# Patient Record
Sex: MALE | Race: WHITE | NOT HISPANIC OR LATINO | Employment: FULL TIME | URBAN - METROPOLITAN AREA
[De-identification: names, ages, dates, MRNs, and addresses within clinical notes are randomized per-mention and may not be internally consistent; named-entity substitution may affect disease eponyms.]

---

## 2017-01-07 ENCOUNTER — TRANSCRIBE ORDERS (OUTPATIENT)
Dept: ADMINISTRATIVE | Facility: HOSPITAL | Age: 57
End: 2017-01-07

## 2017-01-07 ENCOUNTER — APPOINTMENT (OUTPATIENT)
Dept: LAB | Facility: MEDICAL CENTER | Age: 57
End: 2017-01-07
Payer: COMMERCIAL

## 2017-01-07 DIAGNOSIS — Z00.00 ENCOUNTER FOR GENERAL ADULT MEDICAL EXAMINATION WITHOUT ABNORMAL FINDINGS: ICD-10-CM

## 2017-01-07 DIAGNOSIS — I10 ESSENTIAL (PRIMARY) HYPERTENSION: ICD-10-CM

## 2017-01-07 DIAGNOSIS — Z12.5 ENCOUNTER FOR SCREENING FOR MALIGNANT NEOPLASM OF PROSTATE: ICD-10-CM

## 2017-01-07 LAB
ALBUMIN SERPL BCP-MCNC: 3.7 G/DL (ref 3.5–5)
ALP SERPL-CCNC: 62 U/L (ref 46–116)
ALT SERPL W P-5'-P-CCNC: 37 U/L (ref 12–78)
ANION GAP SERPL CALCULATED.3IONS-SCNC: 7 MMOL/L (ref 4–13)
AST SERPL W P-5'-P-CCNC: 21 U/L (ref 5–45)
BASOPHILS # BLD AUTO: 0.06 THOUSANDS/ΜL (ref 0–0.1)
BASOPHILS NFR BLD AUTO: 1 % (ref 0–1)
BILIRUB SERPL-MCNC: 0.58 MG/DL (ref 0.2–1)
BILIRUB UR QL STRIP: ABNORMAL
BUN SERPL-MCNC: 20 MG/DL (ref 5–25)
CALCIUM SERPL-MCNC: 8.8 MG/DL (ref 8.3–10.1)
CHLORIDE SERPL-SCNC: 105 MMOL/L (ref 100–108)
CHOLEST SERPL-MCNC: 197 MG/DL (ref 50–200)
CLARITY UR: ABNORMAL
CO2 SERPL-SCNC: 30 MMOL/L (ref 21–32)
COLOR UR: YELLOW
CREAT SERPL-MCNC: 1.06 MG/DL (ref 0.6–1.3)
EOSINOPHIL # BLD AUTO: 0.14 THOUSAND/ΜL (ref 0–0.61)
EOSINOPHIL NFR BLD AUTO: 3 % (ref 0–6)
ERYTHROCYTE [DISTWIDTH] IN BLOOD BY AUTOMATED COUNT: 13 % (ref 11.6–15.1)
GFR SERPL CREATININE-BSD FRML MDRD: >60 ML/MIN/1.73SQ M
GLUCOSE SERPL-MCNC: 116 MG/DL (ref 65–140)
GLUCOSE UR STRIP-MCNC: NEGATIVE MG/DL
HCT VFR BLD AUTO: 43.1 % (ref 36.5–49.3)
HDLC SERPL-MCNC: 55 MG/DL (ref 40–60)
HGB BLD-MCNC: 14 G/DL (ref 12–17)
HGB UR QL STRIP.AUTO: NEGATIVE
KETONES UR STRIP-MCNC: ABNORMAL MG/DL
LDLC SERPL CALC-MCNC: 129 MG/DL (ref 0–100)
LEUKOCYTE ESTERASE UR QL STRIP: NEGATIVE
LYMPHOCYTES # BLD AUTO: 2.1 THOUSANDS/ΜL (ref 0.6–4.47)
LYMPHOCYTES NFR BLD AUTO: 44 % (ref 14–44)
MCH RBC QN AUTO: 30.3 PG (ref 26.8–34.3)
MCHC RBC AUTO-ENTMCNC: 32.5 G/DL (ref 31.4–37.4)
MCV RBC AUTO: 93 FL (ref 82–98)
MONOCYTES # BLD AUTO: 0.5 THOUSAND/ΜL (ref 0.17–1.22)
MONOCYTES NFR BLD AUTO: 10 % (ref 4–12)
NEUTROPHILS # BLD AUTO: 1.99 THOUSANDS/ΜL (ref 1.85–7.62)
NEUTS SEG NFR BLD AUTO: 42 % (ref 43–75)
NITRITE UR QL STRIP: NEGATIVE
NRBC BLD AUTO-RTO: 0 /100 WBCS
PH UR STRIP.AUTO: 6 [PH] (ref 4.5–8)
PLATELET # BLD AUTO: 197 THOUSANDS/UL (ref 149–390)
PMV BLD AUTO: 11.3 FL (ref 8.9–12.7)
POTASSIUM SERPL-SCNC: 4 MMOL/L (ref 3.5–5.3)
PROT SERPL-MCNC: 7 G/DL (ref 6.4–8.2)
PROT UR STRIP-MCNC: NEGATIVE MG/DL
PSA SERPL-MCNC: 0.8 NG/ML (ref 0–4)
RBC # BLD AUTO: 4.62 MILLION/UL (ref 3.88–5.62)
SODIUM SERPL-SCNC: 142 MMOL/L (ref 136–145)
SP GR UR STRIP.AUTO: 1.03 (ref 1–1.03)
TRIGL SERPL-MCNC: 67 MG/DL
UROBILINOGEN UR QL STRIP.AUTO: 0.2 E.U./DL
WBC # BLD AUTO: 4.79 THOUSAND/UL (ref 4.31–10.16)

## 2017-01-07 PROCEDURE — 80061 LIPID PANEL: CPT

## 2017-01-07 PROCEDURE — 85025 COMPLETE CBC W/AUTO DIFF WBC: CPT

## 2017-01-07 PROCEDURE — 36415 COLL VENOUS BLD VENIPUNCTURE: CPT

## 2017-01-07 PROCEDURE — G0103 PSA SCREENING: HCPCS

## 2017-01-07 PROCEDURE — 81003 URINALYSIS AUTO W/O SCOPE: CPT

## 2017-01-07 PROCEDURE — 80053 COMPREHEN METABOLIC PANEL: CPT

## 2017-01-10 ENCOUNTER — ALLSCRIPTS OFFICE VISIT (OUTPATIENT)
Dept: OTHER | Facility: OTHER | Age: 57
End: 2017-01-10

## 2017-07-01 DIAGNOSIS — Z12.11 ENCOUNTER FOR SCREENING FOR MALIGNANT NEOPLASM OF COLON: ICD-10-CM

## 2017-07-01 DIAGNOSIS — I10 ESSENTIAL (PRIMARY) HYPERTENSION: ICD-10-CM

## 2017-07-01 DIAGNOSIS — E78.5 HYPERLIPIDEMIA: ICD-10-CM

## 2017-07-01 DIAGNOSIS — R73.02 IMPAIRED GLUCOSE TOLERANCE: ICD-10-CM

## 2017-07-01 DIAGNOSIS — E78.1 PURE HYPERGLYCERIDEMIA: ICD-10-CM

## 2017-07-13 ENCOUNTER — APPOINTMENT (OUTPATIENT)
Dept: LAB | Facility: MEDICAL CENTER | Age: 57
End: 2017-07-13
Payer: COMMERCIAL

## 2017-07-13 DIAGNOSIS — I10 ESSENTIAL (PRIMARY) HYPERTENSION: ICD-10-CM

## 2017-07-13 DIAGNOSIS — E78.1 PURE HYPERGLYCERIDEMIA: ICD-10-CM

## 2017-07-13 DIAGNOSIS — E78.5 HYPERLIPIDEMIA: ICD-10-CM

## 2017-07-13 DIAGNOSIS — R73.02 IMPAIRED GLUCOSE TOLERANCE: ICD-10-CM

## 2017-07-13 LAB
ALBUMIN SERPL BCP-MCNC: 3.8 G/DL (ref 3.5–5)
ALP SERPL-CCNC: 61 U/L (ref 46–116)
ALT SERPL W P-5'-P-CCNC: 45 U/L (ref 12–78)
ANION GAP SERPL CALCULATED.3IONS-SCNC: 8 MMOL/L (ref 4–13)
AST SERPL W P-5'-P-CCNC: 34 U/L (ref 5–45)
BASOPHILS # BLD AUTO: 0.07 THOUSANDS/ΜL (ref 0–0.1)
BASOPHILS NFR BLD AUTO: 1 % (ref 0–1)
BILIRUB SERPL-MCNC: 0.69 MG/DL (ref 0.2–1)
BILIRUB UR QL STRIP: NEGATIVE
BUN SERPL-MCNC: 13 MG/DL (ref 5–25)
CALCIUM SERPL-MCNC: 9.1 MG/DL (ref 8.3–10.1)
CHLORIDE SERPL-SCNC: 102 MMOL/L (ref 100–108)
CHOLEST SERPL-MCNC: 170 MG/DL (ref 50–200)
CLARITY UR: CLEAR
CO2 SERPL-SCNC: 29 MMOL/L (ref 21–32)
COLOR UR: YELLOW
CREAT SERPL-MCNC: 1.03 MG/DL (ref 0.6–1.3)
EOSINOPHIL # BLD AUTO: 0.25 THOUSAND/ΜL (ref 0–0.61)
EOSINOPHIL NFR BLD AUTO: 5 % (ref 0–6)
ERYTHROCYTE [DISTWIDTH] IN BLOOD BY AUTOMATED COUNT: 13.1 % (ref 11.6–15.1)
EST. AVERAGE GLUCOSE BLD GHB EST-MCNC: 123 MG/DL
GFR SERPL CREATININE-BSD FRML MDRD: >60 ML/MIN/1.73SQ M
GLUCOSE P FAST SERPL-MCNC: 101 MG/DL (ref 65–99)
GLUCOSE UR STRIP-MCNC: NEGATIVE MG/DL
HBA1C MFR BLD: 5.9 % (ref 4.2–6.3)
HCT VFR BLD AUTO: 41.1 % (ref 36.5–49.3)
HDLC SERPL-MCNC: 53 MG/DL (ref 40–60)
HGB BLD-MCNC: 13.9 G/DL (ref 12–17)
HGB UR QL STRIP.AUTO: NEGATIVE
KETONES UR STRIP-MCNC: NEGATIVE MG/DL
LDLC SERPL CALC-MCNC: 104 MG/DL (ref 0–100)
LEUKOCYTE ESTERASE UR QL STRIP: NEGATIVE
LYMPHOCYTES # BLD AUTO: 2.77 THOUSANDS/ΜL (ref 0.6–4.47)
LYMPHOCYTES NFR BLD AUTO: 56 % (ref 14–44)
MCH RBC QN AUTO: 31.1 PG (ref 26.8–34.3)
MCHC RBC AUTO-ENTMCNC: 33.8 G/DL (ref 31.4–37.4)
MCV RBC AUTO: 92 FL (ref 82–98)
MONOCYTES # BLD AUTO: 0.41 THOUSAND/ΜL (ref 0.17–1.22)
MONOCYTES NFR BLD AUTO: 8 % (ref 4–12)
NEUTROPHILS # BLD AUTO: 1.46 THOUSANDS/ΜL (ref 1.85–7.62)
NEUTS SEG NFR BLD AUTO: 30 % (ref 43–75)
NITRITE UR QL STRIP: NEGATIVE
NRBC BLD AUTO-RTO: 0 /100 WBCS
PH UR STRIP.AUTO: 7 [PH] (ref 4.5–8)
PLATELET # BLD AUTO: 222 THOUSANDS/UL (ref 149–390)
PMV BLD AUTO: 11.5 FL (ref 8.9–12.7)
POTASSIUM SERPL-SCNC: 4 MMOL/L (ref 3.5–5.3)
PROT SERPL-MCNC: 7 G/DL (ref 6.4–8.2)
PROT UR STRIP-MCNC: NEGATIVE MG/DL
RBC # BLD AUTO: 4.47 MILLION/UL (ref 3.88–5.62)
SODIUM SERPL-SCNC: 139 MMOL/L (ref 136–145)
SP GR UR STRIP.AUTO: 1.02 (ref 1–1.03)
TRIGL SERPL-MCNC: 65 MG/DL
UROBILINOGEN UR QL STRIP.AUTO: 0.2 E.U./DL
WBC # BLD AUTO: 4.96 THOUSAND/UL (ref 4.31–10.16)

## 2017-07-13 PROCEDURE — 83036 HEMOGLOBIN GLYCOSYLATED A1C: CPT

## 2017-07-13 PROCEDURE — 81003 URINALYSIS AUTO W/O SCOPE: CPT

## 2017-07-13 PROCEDURE — 36415 COLL VENOUS BLD VENIPUNCTURE: CPT

## 2017-07-13 PROCEDURE — 85025 COMPLETE CBC W/AUTO DIFF WBC: CPT

## 2017-07-13 PROCEDURE — 80061 LIPID PANEL: CPT

## 2017-07-13 PROCEDURE — 80053 COMPREHEN METABOLIC PANEL: CPT

## 2017-07-18 ENCOUNTER — ALLSCRIPTS OFFICE VISIT (OUTPATIENT)
Dept: OTHER | Facility: OTHER | Age: 57
End: 2017-07-18

## 2017-07-19 ENCOUNTER — GENERIC CONVERSION - ENCOUNTER (OUTPATIENT)
Dept: OTHER | Facility: OTHER | Age: 57
End: 2017-07-19

## 2018-01-01 DIAGNOSIS — I10 ESSENTIAL (PRIMARY) HYPERTENSION: ICD-10-CM

## 2018-01-01 DIAGNOSIS — Z12.5 ENCOUNTER FOR SCREENING FOR MALIGNANT NEOPLASM OF PROSTATE: ICD-10-CM

## 2018-01-01 DIAGNOSIS — R73.02 IMPAIRED GLUCOSE TOLERANCE: ICD-10-CM

## 2018-01-01 DIAGNOSIS — E78.1 PURE HYPERGLYCERIDEMIA: ICD-10-CM

## 2018-01-11 NOTE — PROGRESS NOTES
Assessment    1  Benign essential hypertension (401 1) (I10)   2  Erectile dysfunction (607 84) (N52 9)   3  Hypertriglyceridemia (272 1) (E78 1)   4  History of hyperlipidemia (V12 29) (Z86 39)    Plan  Benign essential hypertension, Encounter for prostate cancer screening,  Hypertriglyceridemia, Impaired glucose tolerance    · (1) COMPREHENSIVE METABOLIC PANEL; Status:Active; Requested AEE:56PLQ4720;    · (1) HEMOGLOBIN A1C; Status:Active; Requested UEQ:84IXK8912;    · (1) LIPID PANEL FASTING W DIRECT LDL REFLEX; Status:Active; Requested  XUY:90DIC7794;   Erectile dysfunction    · Levitra 20 MG Oral Tablet; Take as directed  Health Maintenance    · EKG/ECG- POC; Status:Complete;   Done: 34BOC5912 10:13AM      #1 hypertension continue present medications patient's home diary of blood pressures indicates good control  He has no apparent side effects of his present blood pressure medication  #2 patient has a history of erectile dysfunction and presently is utilizing Levitra 20 mg on an as-needed basis for treatment of his erectile dysfunction he finds this medication to be effective at controlling his condition  #3 hypertriglyceridemia and hyperlipidemia patient continues on a careful diet along with fish oil and garlic supplementation as well as turmeric to control his lipid profile  Review of his blood work from this visit indicates total cholesterol 197 with an LDL of 129 and a triglyceride of 67 and HDL 55  He did review with the patient the dietary changes that would help address his mild elevation in LDL  At this time he is not interested in any prescription medications for control of his lipids  Discussion/Summary    In summary this gentleman presents today for a health maintenance examination  He appears to be stable and healthy  We encouraged him to continue with regular exercise and maintain a healthy weight   Healthy diet was reviewed especially with an emphasis on his mild elevation in LDL and borderline glucose intolerance  His electrocardio gram indicates no acute changes and remains a normal tracing  Would like to see him in 6 months for his next blood pressure checkup  Chief Complaint  Pt here for annual physical       History of Present Illness  HM, Adult Male: The patient is being seen for a health maintenance evaluation  The last health maintenance visit was 1 year(s) ago  Social History: Household members include spouse  He is   Work status: working full time  The patient has never smoked cigarettes  He reports occasional alcohol use  He has never used illicit drugs  General Health: The patient's health since the last visit is described as good  He has regular dental visits  He denies vision problems  He denies hearing loss  Immunizations status: up to date  Lifestyle:  He consumes a diverse and healthy diet  He does not have any weight concerns  He exercises regularly  He does not use tobacco  He denies alcohol use  He denies drug use  Screening: cancer screening reviewed and current  metabolic screening reviewed and current  risk screening reviewed and current  HPI: This pleasant 53-year-old gentle woman presents today for routine annual health maintenance examination  He has no active complaints at this time  A review of his medical record indicates a history of hypertension erectile dysfunction hyperlipidemia and hypertriglyceridemia  Review of Systems    Constitutional: No fever or chills, feels well, no tiredness, no recent weight gain or weight loss  Eyes: No complaints of eye pain, no red eyes, no discharge from eyes, no itchy eyes  ENT: no complaints of earache, no hearing loss, no nosebleeds, no nasal discharge, no sore throat, no hoarseness  Cardiovascular: No complaints of slow heart rate, no fast heart rate, no chest pain, no palpitations, no leg claudication, no lower extremity     Respiratory: No complaints of shortness of breath, no wheezing, no cough, no SOB on exertion, no orthopnea or PND  Gastrointestinal: No complaints of abdominal pain, no constipation, no nausea or vomiting, no diarrhea or bloody stools  Genitourinary: No complaints of dysuria, no incontinence, no hesitancy, no nocturia, no genital lesion, no testicular pain  Musculoskeletal: No complaints of arthralgia, no myalgias, no joint swelling or stiffness, no limb pain or swelling  Integumentary: No complaints of skin rash or skin lesions, no itching, no skin wound, no dry skin  Neurological: No compliants of headache, no confusion, no convulsions, no numbness or tingling, no dizziness or fainting, no limb weakness, no difficulty walking  Psychiatric: Is not suicidal, no sleep disturbances, no anxiety or depression, no change in personality, no emotional problems  Endocrine: No complaints of proptosis, no hot flashes, no muscle weakness, no erectile dysfunction, no deepening of the voice, no feelings of weakness  Hematologic/Lymphatic: No complaints of swollen glands, no swollen glands in the neck, does not bleed easily, no easy bruising  Active Problems    1  Benign essential hypertension (401 1) (I10)   2  Colon cancer screening (V76 51) (Z12 11)   3  Encounter for prostate cancer screening (V76 44) (Z12 5)   4  Erectile dysfunction (607 84) (N52 9)   5  History of hyperlipidemia (V12 29) (Z86 39)   6  Hypertriglyceridemia (272 1) (E78 1)   7  Impaired glucose tolerance (790 22) (R73 02)   8  Need for immunization against influenza (V04 81) (Z23)   9   Overweight (278 02) (E66 3)    Family History  Mother    · Family history of HTN (hypertension) (401 9) (I10)   · Family history of Osteoporosis (733 00) (M81 0)  Father    · Family history of Diabetes (250 00) (E11 9)   · Family history of Heart disease (429 9) (I51 9)   · Family history of HTN (hypertension) (401 9) (I10)  Sister    · Family history of HTN (hypertension) (401 9) (I10)   · Family history of Osteoporosis (733 00) (M81 0)  Brother    · Family history of Cancer of kidney (189 0) (C64 9)   · Family history of HTN (hypertension) (401 9) (I10)   · Family history of Kidney stones, calcium oxalate (592 0) (N20 0)    Social History    · Never a smoker    Current Meds   1  Adult Low Dose Aspirin 81 MG TABS; Therapy: (Recorded:30Fyf0299) to Recorded   2  Fish Oil CAPS; Therapy: (Recorded:00Tlq8311) to Recorded   3  Garlic TABS; Therapy: (Recorded:02Sbd6557) to Recorded   4  Glucosamine Chondroitin Joint Oral Tablet; Therapy: (Recorded:32Ssf8429) to Recorded   5  [address]; Therapy: (Recorded:90Hag0423) to Recorded   6  Levitra 20 MG Oral Tablet; Take as directed; Therapy: 03PHX5100 to (Last Rx:80Wel3063)  Requested for: 01Yyn3359 Ordered   7  Lisinopril 10 MG Oral Tablet; Take 1 tablet daily; Therapy: 56DDI6930 to )  Requested for: 58PDK0857; Last   Rx:35Fdg8872 Ordered   8  Mens MultiPlus TABS; Therapy: (Recorded:47Zze6425) to Recorded   9  Niacin 400 MG CPCR; Therapy: (Recorded:19Giu8942) to Recorded   10  Turmeric Curcumin Oral Capsule; Therapy: (Recorded:66Wmw6914) to Recorded    Allergies    1  Penicillins    Vitals   Recorded: 86UFZ7833 09:51AM   Temperature 97 9 F   Heart Rate 82   Respiration 12   Systolic 768   Diastolic 82   Height 6 ft 2 5 in   Weight 196 lb    BMI Calculated 24 83   BSA Calculated 2 17   O2 Saturation 99     Physical Exam    Constitutional   General appearance: No acute distress, well appearing and well nourished  Head and Face   Head and face: Normal     Eyes   Conjunctiva and lids: No erythema, swelling or discharge  Pupils and irises: Equal, round, reactive to light  Ophthalmoscopic examination: Normal fundi and optic discs  Ears, Nose, Mouth, and Throat   External inspection of ears and nose: Normal     Otoscopic examination: Tympanic membranes translucent with normal light reflex  Canals patent without erythema      Nasal mucosa, septum, and turbinates: Normal without edema or erythema  Lips, teeth, and gums: Normal, good dentition  Oropharynx: Normal with no erythema, edema, exudate or lesions  Neck   Neck: Supple, symmetric, trachea midline, no masses  Thyroid: Normal, no thyromegaly  Pulmonary   Respiratory effort: No increased work of breathing or signs of respiratory distress  Percussion of chest: Normal     Auscultation of lungs: Clear to auscultation  Cardiovascular   Auscultation of heart: Normal rate and rhythm, normal S1 and S2, no murmurs  Carotid pulses: 2+ bilaterally  Abdominal aorta: Normal     Pedal pulses: 2+ bilaterally  Examination of extremities for edema and/or varicosities: Normal     Abdomen   Abdomen: Non-tender, no masses  Liver and spleen: No hepatomegaly or splenomegaly  Examination for hernias: No hernias appreciated  Anus, perineum, and rectum: Normal sphincter tone, no masses, no prolapse  Genitourinary   Scrotal contents: Normal testes, no masses  Penis: Normal, no lesions  Digital rectal exam of prostate: Normal size, no masses  Lymphatic   Palpation of lymph nodes in neck: No lymphadenopathy  Palpation of lymph nodes in groin: No lymphadenopathy  Musculoskeletal   Gait and station: Normal     Inspection/palpation of digits and nails: Normal without clubbing or cyanosis  Inspection/palpation of joints, bones, and muscles: Normal     Range of motion: Normal     Stability: Normal     Muscle strength/tone: Normal     Skin   Skin and subcutaneous tissue: Normal without rashes or lesions  Neurologic   Cortical function: Normal mental status  Reflexes: 2+ and symmetric  Coordination: Normal finger to nose and heel to shin  Psychiatric   Judgment and insight: Normal     Orientation to person, place and time: Normal     Recent and remote memory: Intact      Mood and affect: Normal        Results/Data  PHQ-2 Adult Depression Screening 51Voc6518 10:54AM User, [facility]     Test Name Result Flag Reference   PHQ-2 Adult Depression Score 0     Over the last two weeks, how often have you been bothered by any of the following problems? Little interest or pleasure in doing things: Not at all - 0  Feeling down, depressed, or hopeless: Not at all - 0   PHQ-2 Adult Depression Screening Negative       EKG/ECG- POC 07GSW1417 10:13AM Simran Friday     Test Name Result Flag Reference   EKG/ECG See scanned report         Future Appointments    Date/Time Provider Specialty Site   01/23/2018 09:30 AM LEANNA Bosch  Internal Medicine [facility] INTERNAL [facility]   07/20/2018 10:45 AM LEANNA Bosch   Internal Medicine [facility] INTERNAL MED     Signatures   Electronically signed by : LEANNA Salgado ; Jul 19 2017  8:54PM EST                       (Author)

## 2018-01-12 VITALS
BODY MASS INDEX: 24.37 KG/M2 | SYSTOLIC BLOOD PRESSURE: 138 MMHG | DIASTOLIC BLOOD PRESSURE: 82 MMHG | WEIGHT: 196 LBS | TEMPERATURE: 97.9 F | HEART RATE: 82 BPM | RESPIRATION RATE: 12 BRPM | OXYGEN SATURATION: 99 % | HEIGHT: 75 IN

## 2018-01-13 NOTE — PROGRESS NOTES
Assessment    1  Colon cancer screening (V76 51) (Z12 11)   2  Erectile dysfunction (607 84) (N52 9)   3  Hyperlipemia (272 4) (E78 5)   4  Hypertriglyceridemia (272 1) (E78 1)   5  Overweight (278 02) (E66 3)   6  Impaired glucose tolerance (790 22) (R73 02)   7  Benign essential hypertension (401 1) (I10)    Plan  Benign essential hypertension    · EKG/ECG- POC; Status:Complete;   Done: 08YBX9968 02:17PM  Benign essential hypertension, Health Maintenance    · (1) CBC/PLT/DIFF; Status:Active; Requested for:63Ghb9772;    · (1) COMPREHENSIVE METABOLIC PANEL; Status:Active; Requested for:78Aox9071;    · (1) LIPID PANEL FASTING W DIRECT LDL REFLEX; Status:Active; Requested  for:82Fvo6908;   Benign essential hypertension, Health Maintenance, Encounter for prostate cancer  screening    · (1) PSA (SCREEN) (Dx V76 44 Screen for Prostate Cancer); Status:Active; Requested  for:98Hgu6025;   Colon cancer screening, Encounter for prostate cancer screening    · (1) OCCULT BLOOD, FECAL IMMUNOCHEMICAL TEST; Status:Active; Requested  for:42Pge3313;   Encounter for prostate cancer screening    · (1) URINALYSIS (will reflex a microscopy if leukocytes, occult blood, protein or nitrites  are not within normal limits); Status:Active; Requested for:64Cvz7370;      Continue the lisinopril medication for hypertension and Levitra for erectile dysfunction     Discussion/Summary   In summary the patient's physical examination appears to be stable with the exception of gaining 9 pounds over the past year  We discussed decreasing calorie intake and increasing his exercise in an effort to decrease this weight gain  His blood pressure control is very good on his present dose of lisinopril  He does have a history of erectile dysfunction and its good results with Levitra medication  His blood testing is pending at the time of this dictation  His electrocardiogram seems to be stable without any significant abnormalities   I will see him in 6 months for his next blood pressure checkup and a one-year annual physical examination  Chief Complaint  patient is here for a physical       History of Present Illness  HPI: This is an annual complete wellness visit for this 69-year-old gentleman  He presents today with a history of hypertension erectile disorder hyperlipidemia hypertriglyceridemia and glucose intolerance  He is gained 9 pounds over the past year  He has some early arthritic changes in his thumbs bilaterally the changes are consistent with osteo-arthritis  Review of Systems    Constitutional: No fever or chills, feels well, no tiredness, no recent weight gain or weight loss  Eyes: No complaints of eye pain, no red eyes, no discharge from eyes, no itchy eyes  ENT: no complaints of earache, no hearing loss, no nosebleeds, no nasal discharge, no sore throat, no hoarseness  Cardiovascular: No complaints of slow heart rate, no fast heart rate, no chest pain, no palpitations, no leg claudication, no lower extremity  Respiratory: No complaints of shortness of breath, no wheezing, no cough, no SOB on exertion, no orthopnea or PND  Gastrointestinal: Occasional gastritis symptoms relieved with either H2 agent or antiacid  Genitourinary: No complaints of dysuria, no incontinence, no hesitancy, no nocturia, no genital lesion, no testicular pain  Musculoskeletal: Early osteoarthritis changes in both thumbs  Integumentary: No complaints of skin rash or skin lesions, no itching, no skin wound, no dry skin  Neurological: No compliants of headache, no confusion, no convulsions, no numbness or tingling, no dizziness or fainting, no limb weakness, no difficulty walking  Psychiatric: Is not suicidal, no sleep disturbances, no anxiety or depression, no change in personality, no emotional problems  Endocrine: No complaints of proptosis, no hot flashes, no muscle weakness, no erectile dysfunction, no deepening of the voice, no feelings of weakness  Hematologic/Lymphatic: No complaints of swollen glands, no swollen glands in the neck, does not bleed easily, no easy bruising  Active Problems    1  Benign essential hypertension (401 1) (I10)    Family History  Mother    · Family history of HTN (hypertension) (401 9) (I10)   · Family history of Osteoporosis (733 00) (M81 0)  Father    · Family history of Diabetes (250 00) (E11 9)   · Family history of Heart disease (429 9) (I51 9)   · Family history of HTN (hypertension) (401 9) (I10)  Sister    · Family history of HTN (hypertension) (401 9) (I10)   · Family history of Osteoporosis (733 00) (M81 0)  Brother    · Family history of Cancer of kidney (189 0) (C64 9)   · Family history of HTN (hypertension) (401 9) (I10)   · Family history of Kidney stones, calcium oxalate (592 0) (N20 0)    Social History    · Never a smoker    Current Meds   1  Levitra 20 MG Oral Tablet; Take as directed Recorded   2  Lisinopril 20 MG Oral Tablet; take 1 tablet by mouth every day; Therapy: 92HLE7370 to (Evaluate:18Jun2017)  Requested for: 68JJQ0260; Last   RF:38LWX6688 Ordered    Allergies    1  Penicillins    Vitals   ** Printed in Appendix #1 below  Physical Exam    Constitutional   General appearance: No acute distress, well appearing and well nourished  Head and Face   Head and face: Normal     Eyes   Conjunctiva and lids: No erythema, swelling or discharge  Pupils and irises: Equal, round, reactive to light  Ophthalmoscopic examination: Normal fundi and optic discs  Ears, Nose, Mouth, and Throat   External inspection of ears and nose: Normal     Otoscopic examination: Tympanic membranes translucent with normal light reflex  Canals patent without erythema  Nasal mucosa, septum, and turbinates: Normal without edema or erythema  Lips, teeth, and gums: Normal, good dentition  Oropharynx: Normal with no erythema, edema, exudate or lesions      Neck   Neck: Supple, symmetric, trachea midline, no masses  Thyroid: Normal, no thyromegaly  Pulmonary   Respiratory effort: No increased work of breathing or signs of respiratory distress  Percussion of chest: Normal     Auscultation of lungs: Clear to auscultation  Cardiovascular   Auscultation of heart: Normal rate and rhythm, normal S1 and S2, no murmurs  Carotid pulses: 2+ bilaterally  Abdominal aorta: Normal     Pedal pulses: 2+ bilaterally  Examination of extremities for edema and/or varicosities: Normal     Abdomen   Abdomen: Non-tender, no masses  Liver and spleen: No hepatomegaly or splenomegaly  Examination for hernias: No hernias appreciated  Anus, perineum, and rectum: Normal sphincter tone, no masses, no prolapse  Genitourinary   Scrotal contents: Normal testes, no masses  Penis: Normal, no lesions  Digital rectal exam of prostate: Normal size, no masses  Lymphatic   Palpation of lymph nodes in neck: No lymphadenopathy  Palpation of lymph nodes in groin: No lymphadenopathy  Musculoskeletal   Gait and station: Normal     Inspection/palpation of digits and nails: Normal without clubbing or cyanosis  Inspection/palpation of joints, bones, and muscles: Normal     Range of motion: Normal     Stability: Normal     Muscle strength/tone: Normal     Skin   Skin and subcutaneous tissue: Normal without rashes or lesions  Neurologic   Cortical function: Normal mental status  Reflexes: 2+ and symmetric  Coordination: Normal finger to nose and heel to shin  Psychiatric   Judgment and insight: Normal     Orientation to person, place and time: Normal     Recent and remote memory: Intact  Mood and affect: Normal        Results/Data  EKG/ECG- POC 65SIC2273 02:17PM Talia Culp     Test Name Result Flag Reference   EKG/ECG 07/12/2016         Future Appointments    Date/Time Provider Specialty Site   01/10/2017 10:00 AM LEANNA Salcedo   Internal Medicine [facility] INTERNAL MED     Signatures Electronically signed by : LEANNA Block ; 2016  3:23PM EST                       (Author)    Appendix #1     Patient: Eder Blount; : 1960; MRN: [id number]      Recorded: 91QJS9231 02:53PM Recorded: 21Msx5084 02:49PM Recorded: 20IQU8919 02:24PM   Temperature   97 6 F   Heart Rate 84  95   Respiration   14   Systolic  293 130   Diastolic  74 76   Height   6 ft 2 5 in   Weight   225 lb 0 64 oz   BMI Calculated   28 51   BSA Calculated   2 3   O2 Saturation   98

## 2018-01-14 VITALS
HEIGHT: 75 IN | RESPIRATION RATE: 14 BRPM | HEART RATE: 90 BPM | OXYGEN SATURATION: 99 % | TEMPERATURE: 97.3 F | SYSTOLIC BLOOD PRESSURE: 128 MMHG | WEIGHT: 197 LBS | BODY MASS INDEX: 24.49 KG/M2 | DIASTOLIC BLOOD PRESSURE: 80 MMHG

## 2018-01-16 NOTE — MISCELLANEOUS
Message  Patient is experiencing low BP  he is exercising daily and has lost 26 lbs since his last visit   We will reduce his Lisinopril from 20mg to 10mg daily and observe closely      Plan  Benign essential hypertension    · From  Lisinopril 20 MG Oral Tablet take 1 tablet by mouth every day To  Lisinopril 10 MG Oral Tablet Take 1 tablet daily    Signatures   Electronically signed by : LEANNA Johnson ; Nov 28 2016  8:26AM EST                       (Author)

## 2018-01-18 ENCOUNTER — APPOINTMENT (OUTPATIENT)
Dept: LAB | Facility: MEDICAL CENTER | Age: 58
End: 2018-01-18
Payer: COMMERCIAL

## 2018-01-18 DIAGNOSIS — Z12.5 ENCOUNTER FOR SCREENING FOR MALIGNANT NEOPLASM OF PROSTATE: ICD-10-CM

## 2018-01-18 DIAGNOSIS — R73.02 IMPAIRED GLUCOSE TOLERANCE: ICD-10-CM

## 2018-01-18 DIAGNOSIS — E78.1 PURE HYPERGLYCERIDEMIA: ICD-10-CM

## 2018-01-18 DIAGNOSIS — I10 ESSENTIAL (PRIMARY) HYPERTENSION: ICD-10-CM

## 2018-01-18 LAB
ALBUMIN SERPL BCP-MCNC: 4.1 G/DL (ref 3.5–5)
ALP SERPL-CCNC: 66 U/L (ref 46–116)
ALT SERPL W P-5'-P-CCNC: 29 U/L (ref 12–78)
ANION GAP SERPL CALCULATED.3IONS-SCNC: 6 MMOL/L (ref 4–13)
AST SERPL W P-5'-P-CCNC: 19 U/L (ref 5–45)
BILIRUB SERPL-MCNC: 0.74 MG/DL (ref 0.2–1)
BUN SERPL-MCNC: 20 MG/DL (ref 5–25)
CALCIUM SERPL-MCNC: 9.1 MG/DL (ref 8.3–10.1)
CHLORIDE SERPL-SCNC: 102 MMOL/L (ref 100–108)
CHOLEST SERPL-MCNC: 168 MG/DL (ref 50–200)
CO2 SERPL-SCNC: 31 MMOL/L (ref 21–32)
CREAT SERPL-MCNC: 1.01 MG/DL (ref 0.6–1.3)
EST. AVERAGE GLUCOSE BLD GHB EST-MCNC: 123 MG/DL
GFR SERPL CREATININE-BSD FRML MDRD: 82 ML/MIN/1.73SQ M
GLUCOSE P FAST SERPL-MCNC: 109 MG/DL (ref 65–99)
HBA1C MFR BLD: 5.9 % (ref 4.2–6.3)
HDLC SERPL-MCNC: 49 MG/DL (ref 40–60)
LDLC SERPL CALC-MCNC: 104 MG/DL (ref 0–100)
POTASSIUM SERPL-SCNC: 3.7 MMOL/L (ref 3.5–5.3)
PROT SERPL-MCNC: 7.3 G/DL (ref 6.4–8.2)
SODIUM SERPL-SCNC: 139 MMOL/L (ref 136–145)
TRIGL SERPL-MCNC: 77 MG/DL

## 2018-01-18 PROCEDURE — 80053 COMPREHEN METABOLIC PANEL: CPT

## 2018-01-18 PROCEDURE — 36415 COLL VENOUS BLD VENIPUNCTURE: CPT

## 2018-01-18 PROCEDURE — 83036 HEMOGLOBIN GLYCOSYLATED A1C: CPT

## 2018-01-18 PROCEDURE — 80061 LIPID PANEL: CPT

## 2018-01-23 ENCOUNTER — ALLSCRIPTS OFFICE VISIT (OUTPATIENT)
Dept: OTHER | Facility: OTHER | Age: 58
End: 2018-01-23

## 2018-01-24 NOTE — PROGRESS NOTES
Assessment   1  Nodule of shaft of penis (607 89) (N48 89)   2  Benign essential hypertension (401 1) (I10)   3  Erectile dysfunction (607 84) (N52 9)   4  Hypertriglyceridemia (272 1) (E78 1)   5  Impaired glucose tolerance (790 22) (R73 02)   6  Overweight (278 02) (E66 3)    Plan   Benign essential hypertension, Colon cancer screening, Encounter for prostate cancer screening,    History of hyperlipidemia, Hypertriglyceridemia, Impaired glucose tolerance, Overweight    · (1) PSA (SCREEN) (Dx V76 44 Screen for Prostate Cancer); Status:Active; Requested    for:01Jul2018; Benign essential hypertension, Colon cancer screening, History of hyperlipidemia,    Hypertriglyceridemia, Impaired glucose tolerance, Overweight    · (1) CBC/PLT/DIFF; Status:Active; Requested for:01Jul2018;    · (1) COMPREHENSIVE METABOLIC PANEL; Status:Active; Requested for:01Jul2018;    · (1) LIPID PANEL FASTING W DIRECT LDL REFLEX; Status:Active; Requested for:01Jul2018;    · (1) OCCULT BLOOD, FECAL IMMUNOCHEMICAL TEST; Status:Active; Requested for:01Jul2018;    · (1) URINALYSIS (will reflex a microscopy if leukocytes, occult blood, protein or nitrites are not within    normal limits); Status:Active; Requested for:01Jul2018;   Need for immunization against influenza    · Fluzone Quadrivalent Intramuscular Suspension      1  Hypertension control at this time appears to be good I recommend that he continue on lisinopril 10 mg daily he has no apparent side effects of the medication  Weight loss would be beneficial for improved control of his hypertension  2   Erectile dysfunction the patient continues to have difficulties maintaining erections  He presently is using Levitra on an occasional basis  He has used a ring type tourniquet device at times which may have caused some damage to the shaft of his penis I have referred him to Urology for further evaluation of this nodularity in the soft tissue of the penis            3  Hypertriglyceridemia control is good patient is encouraged to continue with a healthy diet regular exercise weight reduction and Krill oil as well as fish oil supplementation  4   Impaired glucose tolerance it is mild at this point recommend lifestyle adjustments to address it  Recommend that he maintain a healthy diet no void concentrated sweets in decrease his carbohydrate consumption  He is encouraged to get regular exercise in the form of walking when ever possible and also is total calorie consumption on a daily basis  5   Overweight condition we discussed today the affect of overweight on the patient's hypertension triglyceridemia and glucose intolerance  We reviewed in detail the lifestyle adjustments to address his overweight condition including regular exercise and calorie consumption reduction  Chief Complaint   Chief Complaint Free Text Note Form: patient is here for a 6 month follow up  History of Present Illness   HPI: This is a routine visit for this 53-year-old gentleman  He has a history of mild glucose intolerance as well as hyperlipidemia  He had blood work performed for this visit and we reviewed the results with the patient today  His hemoglobin A1c is 5 9% consistent with his last test in July  His fasting glucose was 109 indicating adequate control of his blood sugars at this time  Reviewed with the patient diet exercise and weight control as the favorable lifestyle treatment for this condition  Patient's lipid profile was also reviewed with the patient today is encouraged to continue with a healthy diet regular exercise and continue on his Krill oil and fish well for control of his cholesterol and triglycerides  He has a history of hypertension and is on lisinopril 10 mg daily which appears to be working well for control of his hypertension     patient also notes that he has detected 2 firm areas in the penis shaft that he believes started after using a ring type device on his penis to maintain erections  These have not increased in size but remained sensitive  Review of Systems   Complete-Male:      Constitutional: No fever or chills, feels well, no tiredness, no recent weight gain or weight loss  Eyes: No complaints of eye pain, no red eyes, no discharge from eyes, no itchy eyes  ENT: no complaints of earache, no hearing loss, no nosebleeds, no nasal discharge, no sore throat, no hoarseness  Cardiovascular: No complaints of slow heart rate, no fast heart rate, no chest pain, no palpitations, no leg claudication, no lower extremity  Respiratory: No complaints of shortness of breath, no wheezing, no cough, no SOB on exertion, no orthopnea or PND  Gastrointestinal: No complaints of abdominal pain, no constipation, no nausea or vomiting, no diarrhea or bloody stools  Genitourinary: No complaints of dysuria, no incontinence, no hesitancy, no nocturia, no genital lesion, no testicular pain  Musculoskeletal: No complaints of arthralgia, no myalgias, no joint swelling or stiffness, no limb pain or swelling  Integumentary: No complaints of skin rash or skin lesions, no itching, no skin wound, no dry skin  Neurological: No compliants of headache, no confusion, no convulsions, no numbness or tingling, no dizziness or fainting, no limb weakness, no difficulty walking  Psychiatric: Is not suicidal, no sleep disturbances, no anxiety or depression, no change in personality, no emotional problems  Endocrine: No complaints of proptosis, no hot flashes, no muscle weakness, no erectile dysfunction, no deepening of the voice, no feelings of weakness  Hematologic/Lymphatic: No complaints of swollen glands, no swollen glands in the neck, does not bleed easily, no easy bruising  Active Problems   1  Benign essential hypertension (401 1) (I10)   2  Colon cancer screening (V76 51) (Z12 11)   3   Encounter for prostate cancer screening (V76 44) (Z12 5)   4  Erectile dysfunction (607 84) (N52 9)   5  History of hyperlipidemia (V12 29) (Z86 39)   6  Hypertriglyceridemia (272 1) (E78 1)   7  Impaired glucose tolerance (790 22) (R73 02)   8  Need for immunization against influenza (V04 81) (Z23)   9  Overweight (278 02) (E66 3)    Past Medical History   Active Problems And Past Medical History Reviewed: The active problems and past medical history were reviewed and updated today  Surgical History   Surgical History Reviewed: The surgical history was reviewed and updated today  Family History   Mother    1  Family history of HTN (hypertension) (401 9) (I10)   2  Family history of Osteoporosis (733 00) (M81 0)  Father    3  Family history of Diabetes (250 00) (E11 9)   4  Family history of Heart disease (429 9) (I51 9)   5  Family history of HTN (hypertension) (401 9) (I10)  Sister    6  Family history of HTN (hypertension) (401 9) (I10)   7  Family history of Osteoporosis (733 00) (M81 0)  Brother    8  Family history of Cancer of kidney (189 0) (C64 9)   9  Family history of HTN (hypertension) (401 9) (I10)   10  Family history of Kidney stones, calcium oxalate (592 0) (N20 0)  Family History Reviewed: The family history was reviewed and updated today  Social History    · Never a smoker  Social History Reviewed: The social history was reviewed and updated today  The social history was reviewed and is unchanged  Current Meds    1  Adult Low Dose Aspirin 81 MG TABS; Therapy: (Recorded:63Dii7420) to Recorded   2  Fish Oil CAPS; Therapy: (Recorded:62Nwu7712) to Recorded   3  Garlic TABS; Therapy: (Recorded:63Nqu1727) to Recorded   4  Glucosamine Chondroitin Joint Oral Tablet; Therapy: (Recorded:64Dkn7406) to Recorded   5  [address]; Therapy: (Recorded:50Ezr2520) to Recorded   6  Levitra 20 MG Oral Tablet; Take as directed;      Therapy: 42UKE2611 to (Last Rx:91Giq7808)  Requested for: 38DIR1296 Ordered   7  Lisinopril 10 MG Oral Tablet; TAKE 1 TABLET DAILY FOR BLOOD PRESSURE; Therapy: 65WKO4256 to (Evaluate:06Jan2019)  Requested for: 32CCL4192; Last Rx:11Jan2018     Ordered   8  Mens MultiPlus TABS; Therapy: (Recorded:34Dwl4801) to Recorded   9  Niacin 400 MG CPCR; Therapy: (Recorded:32Mot2559) to Recorded   10  Turmeric Curcumin Oral Capsule; Therapy: (Recorded:34Xyb4388) to Recorded  Medication List Reviewed: The medication list was reviewed and updated today  Allergies   1  Penicillins    Vitals   Vital Signs    Recorded: 04TMZ8534 09:46AM Recorded: 71WNY0414 09:15AM   Temperature  98 F   Heart Rate  83   Respiration  12   Systolic 785 724   Diastolic 76 80   Height  6 ft 2 5 in   Weight  197 lb 0 6 oz   BMI Calculated  24 96   BSA Calculated  2 17   O2 Saturation  99     Physical Exam        Constitutional      General appearance: No acute distress, well appearing and well nourished  Eyes      Conjunctiva and lids: No swelling, erythema, or discharge  Pulmonary      Respiratory effort: No increased work of breathing or signs of respiratory distress  Auscultation of lungs: Clear to auscultation, equal breath sounds bilaterally, no wheezes, no rales, no rhonci  Cardiovascular      Auscultation of heart: Normal rate and rhythm, normal S1 and S2, without murmurs  Additional Exam:  Patient has 2 areas of of firmness in the shaft of his penis they do not seem to be inflamed or irritated  I suspect they are some type of scar tissue possibly due to the previous use of a ring device to maintain erections  Future Appointments      Date/Time Provider Specialty Site   07/20/2018 10:45 AM LEANNA Bhatt   Internal Medicine [facility] INTERNAL MED     Signatures    Electronically signed by : LEANNA Raymundo ; Jan 23 2018  4:37PM EST                       (Author)

## 2018-01-29 ENCOUNTER — TELEPHONE (OUTPATIENT)
Dept: UROLOGY | Facility: AMBULATORY SURGERY CENTER | Age: 58
End: 2018-01-29

## 2018-02-16 RX ORDER — GLUCOSAM/MSM/CHOND/HYALURON AC 750-60-150
TABLET ORAL
COMMUNITY
End: 2018-07-20

## 2018-02-16 RX ORDER — NIACIN 500 MG
TABLET ORAL
COMMUNITY
End: 2018-07-20

## 2018-02-16 RX ORDER — VARDENAFIL HYDROCHLORIDE 20 MG/1
TABLET ORAL
COMMUNITY
Start: 2016-07-12 | End: 2018-07-20 | Stop reason: ALTCHOICE

## 2018-02-16 RX ORDER — BIOTIN 5 MG
TABLET ORAL
COMMUNITY

## 2018-02-16 RX ORDER — LISINOPRIL 10 MG/1
1 TABLET ORAL DAILY
COMMUNITY
Start: 2016-06-23 | End: 2019-01-02 | Stop reason: SDUPTHER

## 2018-02-19 ENCOUNTER — CONSULT (OUTPATIENT)
Dept: UROLOGY | Facility: AMBULATORY SURGERY CENTER | Age: 58
End: 2018-02-19
Payer: COMMERCIAL

## 2018-02-19 VITALS
HEIGHT: 75 IN | DIASTOLIC BLOOD PRESSURE: 90 MMHG | HEART RATE: 92 BPM | WEIGHT: 198 LBS | BODY MASS INDEX: 24.62 KG/M2 | SYSTOLIC BLOOD PRESSURE: 120 MMHG

## 2018-02-19 DIAGNOSIS — N52.9 ERECTILE DYSFUNCTION, UNSPECIFIED ERECTILE DYSFUNCTION TYPE: ICD-10-CM

## 2018-02-19 DIAGNOSIS — N48.6 PEYRONIE DISEASE: Primary | ICD-10-CM

## 2018-02-19 LAB
SL AMB  POCT GLUCOSE, UA: NORMAL
SL AMB LEUKOCYTE ESTERASE,UA: NORMAL
SL AMB POCT BILIRUBIN,UA: NORMAL
SL AMB POCT BLOOD,UA: NORMAL
SL AMB POCT CLARITY,UA: NORMAL
SL AMB POCT COLOR,UA: YELLOW
SL AMB POCT KETONES,UA: NORMAL
SL AMB POCT NITRITE,UA: NORMAL
SL AMB POCT PH,UA: 5
SL AMB POCT SPECIFIC GRAVITY,UA: 1.01
SL AMB POCT URINE PROTEIN: NORMAL
SL AMB POCT UROBILINOGEN: NORMAL

## 2018-02-19 PROCEDURE — 99244 OFF/OP CNSLTJ NEW/EST MOD 40: CPT | Performed by: UROLOGY

## 2018-02-19 PROCEDURE — 81002 URINALYSIS NONAUTO W/O SCOPE: CPT | Performed by: UROLOGY

## 2018-02-19 RX ORDER — VARDENAFIL HYDROCHLORIDE 10 MG/1
10 TABLET ORAL EVERY 24 HOURS
COMMUNITY
End: 2018-07-20

## 2018-02-19 NOTE — PROGRESS NOTES
2/19/2018    Tessa De La Torre  1960  [id number]        Assessment  Peyronie's plaque    Plan  Had an extended discussion with the patient about historical treatments for Peyronie's as well as newer treatments  Focused on surgical revision and Xiaflex injection  At this time he is not interested in any treatment  We discussed that observation is certainly appropriate for him  He will notify us if he develops any worsening symptoms, but otherwise follow-up only as needed  He will continue prostate cancer screening with his primary care physician  History of Present Illness  Tawanda Cadena is a 61 y o  male complains of nodules within the penis  He reports that over 1 year ago he fell asleep with a constriction band around the base of the penis  Afterwards he noticed a lump towards the base of the penis  Initially was painful  There is no further pain however he thinks there is more than 1 lump at this time  He is also describing an hourglass type deformity with erection  Mild curvature dorsally  No other complaints  No urinary symptoms  No family history of prostate cancer  Review of Systems  Review of Systems   Constitutional: Negative  HENT: Negative  Respiratory: Negative  Cardiovascular: Negative  Gastrointestinal: Negative  Genitourinary:        As per HPI   Musculoskeletal: Negative  Skin: Negative  Neurological: Negative  Hematological: Negative  Past Medical History  History reviewed  No pertinent past medical history      Past Social History  Past Surgical History:   Procedure Laterality Date    GALLBLADDER SURGERY      TONSILLECTOMY  1963       Past Family History  Family History   Problem Relation Age of Onset    Hypertension Mother     Osteoporosis Mother     Diabetes Father     Heart disease Father     Hypertension Father     Hypertension Sister     Osteoporosis Sister     Kidney cancer Brother     Hypertension Brother        Past Social history  Social History     Social History    Marital status: /Civil Union     Spouse name: N/A    Number of children: N/A    Years of education: N/A     Occupational History    Not on file  Social History Main Topics    Smoking status: Never Smoker    Smokeless tobacco: Never Used    Alcohol use 1 2 oz/week     2 Glasses of wine per week      Comment: daily    Drug use: Unknown    Sexual activity: Not on file     Other Topics Concern    Not on file     Social History Narrative    No narrative on file       Current Medications  Current Outpatient Prescriptions   Medication Sig Dispense Refill    aspirin 81 MG tablet Take by mouth      Garlic 731 MG TABS Take by mouth      Glucos-Chondroit-Hyaluron-MSM (GLUCOSAMINE CHONDROITIN JOINT) TABS Take by mouth      Krill Oil 1000 MG CAPS Take by mouth      lisinopril (ZESTRIL) 10 mg tablet Take 1 tablet by mouth daily      Multiple Vitamins-Minerals (MENS MULTIPLUS PO) Take by mouth      niacin 500 mg tablet Take by mouth      Omega-3 Fatty Acids (FISH OIL) 645 MG CAPS Take by mouth      TURMERIC CURCUMIN PO Take by mouth      vardenafil (LEVITRA) 10 MG tablet Take 10 mg by mouth every 24 hours      vardenafil (LEVITRA) 20 MG tablet Take by mouth       No current facility-administered medications for this visit  Allergies  Allergies   Allergen Reactions    Penicillins Hives       Past Medical History, Social History, Family History, medications and allergies were reviewed  Vitals  Vitals:    02/19/18 1410   BP: 120/90   Pulse: 92   Weight: 89 8 kg (198 lb)   Height: 6' 3" (1 905 m)       Physical Exam  Physical Exam   Constitutional: He is oriented to person, place, and time  He appears well-developed and well-nourished  Cardiovascular: Normal rate  Pulmonary/Chest: Effort normal    Abdominal: Soft     Genitourinary:   Genitourinary Comments: Multiple large firm plaques towards the base of the penis consistent with Peyronie's plaques  Musculoskeletal: Normal range of motion  Neurological: He is alert and oriented to person, place, and time  Skin: Skin is warm, dry and intact  Psychiatric: He has a normal mood and affect  Vitals reviewed          Results  Lab Results   Component Value Date    PSA 0 8 01/07/2017     Lab Results   Component Value Date    GLUCOSE 116 01/07/2017    CALCIUM 9 1 01/18/2018     01/18/2018    K 3 7 01/18/2018    CO2 31 01/18/2018     01/18/2018    BUN 20 01/18/2018    CREATININE 1 01 01/18/2018     Lab Results   Component Value Date    WBC 4 96 07/13/2017    HGB 13 9 07/13/2017    HCT 41 1 07/13/2017    MCV 92 07/13/2017     07/13/2017

## 2018-02-19 NOTE — LETTER
February 19, 2018     Yasir Vazquez MD  [address]  [address] [place] [id number]    Patient: Kymberly Ramos   YOB: 1960   Date of Visit: 2/19/2018       Dear Dr Bk Erwin:    Thank you for referring Kymberly Ramos to me for evaluation  Below are my notes for this consultation  If you have questions, please do not hesitate to call me  I look forward to following your patient along with you  Sincerely,        Vale Cordova MD        CC: No Recipients  Vale Cordova MD  2/19/2018  2:47 PM  Sign at close encounter  2/19/2018    Kymberly Ramos  1960  [id number]        Assessment  Peyronie's plaque    Plan  Had an extended discussion with the patient about historical treatments for Peyronie's as well as newer treatments  Focused on surgical revision and Xiaflex injection  At this time he is not interested in any treatment  We discussed that observation is certainly appropriate for him  He will notify us if he develops any worsening symptoms, but otherwise follow-up only as needed  He will continue prostate cancer screening with his primary care physician  History of Present Illness  Sheryl Reza is a 61 y o  male complains of nodules within the penis  He reports that over 1 year ago he fell asleep with a constriction band around the base of the penis  Afterwards he noticed a lump towards the base of the penis  Initially was painful  There is no further pain however he thinks there is more than 1 lump at this time  He is also describing an hourglass type deformity with erection  Mild curvature dorsally  No other complaints  No urinary symptoms  No family history of prostate cancer  Review of Systems  Review of Systems   Constitutional: Negative  HENT: Negative  Respiratory: Negative  Cardiovascular: Negative  Gastrointestinal: Negative  Genitourinary:        As per HPI   Musculoskeletal: Negative  Skin: Negative  Neurological: Negative  Hematological: Negative  Past Medical History  History reviewed  No pertinent past medical history  Past Social History  Past Surgical History:   Procedure Laterality Date    GALLBLADDER SURGERY      TONSILLECTOMY  1963       Past Family History  Family History   Problem Relation Age of Onset    Hypertension Mother     Osteoporosis Mother     Diabetes Father     Heart disease Father     Hypertension Father     Hypertension Sister     Osteoporosis Sister     Kidney cancer Brother     Hypertension Brother        Past Social history  Social History     Social History    Marital status: /Civil Union     Spouse name: N/A    Number of children: N/A    Years of education: N/A     Occupational History    Not on file  Social History Main Topics    Smoking status: Never Smoker    Smokeless tobacco: Never Used    Alcohol use 1 2 oz/week     2 Glasses of wine per week      Comment: daily    Drug use: Unknown    Sexual activity: Not on file     Other Topics Concern    Not on file     Social History Narrative    No narrative on file       Current Medications  Current Outpatient Prescriptions   Medication Sig Dispense Refill    aspirin 81 MG tablet Take by mouth      Garlic 712 MG TABS Take by mouth      Glucos-Chondroit-Hyaluron-MSM (GLUCOSAMINE CHONDROITIN JOINT) TABS Take by mouth      Krill Oil 1000 MG CAPS Take by mouth      lisinopril (ZESTRIL) 10 mg tablet Take 1 tablet by mouth daily      Multiple Vitamins-Minerals (MENS MULTIPLUS PO) Take by mouth      niacin 500 mg tablet Take by mouth      Omega-3 Fatty Acids (FISH OIL) 645 MG CAPS Take by mouth      TURMERIC CURCUMIN PO Take by mouth      vardenafil (LEVITRA) 10 MG tablet Take 10 mg by mouth every 24 hours      vardenafil (LEVITRA) 20 MG tablet Take by mouth       No current facility-administered medications for this visit          Allergies  Allergies   Allergen Reactions    Penicillins Hives       Past Medical History, Social History, Family History, medications and allergies were reviewed  Vitals  Vitals:    02/19/18 1410   BP: 120/90   Pulse: 92   Weight: 89 8 kg (198 lb)   Height: 6' 3" (1 905 m)       Physical Exam  Physical Exam   Constitutional: He is oriented to person, place, and time  He appears well-developed and well-nourished  Cardiovascular: Normal rate  Pulmonary/Chest: Effort normal    Abdominal: Soft  Genitourinary:   Genitourinary Comments: Multiple large firm plaques towards the base of the penis consistent with Peyronie's plaques  Musculoskeletal: Normal range of motion  Neurological: He is alert and oriented to person, place, and time  Skin: Skin is warm, dry and intact  Psychiatric: He has a normal mood and affect  Vitals reviewed          Results  Lab Results   Component Value Date    PSA 0 8 01/07/2017     Lab Results   Component Value Date    GLUCOSE 116 01/07/2017    CALCIUM 9 1 01/18/2018     01/18/2018    K 3 7 01/18/2018    CO2 31 01/18/2018     01/18/2018    BUN 20 01/18/2018    CREATININE 1 01 01/18/2018     Lab Results   Component Value Date    WBC 4 96 07/13/2017    HGB 13 9 07/13/2017    HCT 41 1 07/13/2017    MCV 92 07/13/2017     07/13/2017

## 2018-07-01 DIAGNOSIS — R73.02 IMPAIRED GLUCOSE TOLERANCE: ICD-10-CM

## 2018-07-01 DIAGNOSIS — E78.1 PURE HYPERGLYCERIDEMIA: ICD-10-CM

## 2018-07-01 DIAGNOSIS — Z12.5 ENCOUNTER FOR SCREENING FOR MALIGNANT NEOPLASM OF PROSTATE: ICD-10-CM

## 2018-07-01 DIAGNOSIS — I10 ESSENTIAL (PRIMARY) HYPERTENSION: ICD-10-CM

## 2018-07-01 DIAGNOSIS — Z86.39 PERSONAL HISTORY OF OTHER ENDOCRINE, NUTRITIONAL AND METABOLIC DISEASE: ICD-10-CM

## 2018-07-01 DIAGNOSIS — Z12.11 ENCOUNTER FOR SCREENING FOR MALIGNANT NEOPLASM OF COLON: ICD-10-CM

## 2018-07-01 DIAGNOSIS — E66.3 OVERWEIGHT: ICD-10-CM

## 2018-07-11 ENCOUNTER — APPOINTMENT (OUTPATIENT)
Dept: LAB | Facility: MEDICAL CENTER | Age: 58
End: 2018-07-11
Payer: COMMERCIAL

## 2018-07-11 DIAGNOSIS — Z12.5 ENCOUNTER FOR SCREENING FOR MALIGNANT NEOPLASM OF PROSTATE: ICD-10-CM

## 2018-07-11 DIAGNOSIS — R73.02 IMPAIRED GLUCOSE TOLERANCE: ICD-10-CM

## 2018-07-11 DIAGNOSIS — Z86.39 PERSONAL HISTORY OF OTHER ENDOCRINE, NUTRITIONAL AND METABOLIC DISEASE: ICD-10-CM

## 2018-07-11 DIAGNOSIS — I10 ESSENTIAL (PRIMARY) HYPERTENSION: ICD-10-CM

## 2018-07-11 DIAGNOSIS — Z12.11 ENCOUNTER FOR SCREENING FOR MALIGNANT NEOPLASM OF COLON: ICD-10-CM

## 2018-07-11 DIAGNOSIS — E66.3 OVERWEIGHT: ICD-10-CM

## 2018-07-11 DIAGNOSIS — E78.1 PURE HYPERGLYCERIDEMIA: ICD-10-CM

## 2018-07-11 LAB
ALBUMIN SERPL BCP-MCNC: 3.8 G/DL (ref 3.5–5)
ALP SERPL-CCNC: 63 U/L (ref 46–116)
ALT SERPL W P-5'-P-CCNC: 35 U/L (ref 12–78)
ANION GAP SERPL CALCULATED.3IONS-SCNC: 4 MMOL/L (ref 4–13)
AST SERPL W P-5'-P-CCNC: 22 U/L (ref 5–45)
BASOPHILS # BLD AUTO: 0.07 THOUSANDS/ΜL (ref 0–0.1)
BASOPHILS NFR BLD AUTO: 2 % (ref 0–1)
BILIRUB SERPL-MCNC: 0.54 MG/DL (ref 0.2–1)
BILIRUB UR QL STRIP: NEGATIVE
BUN SERPL-MCNC: 16 MG/DL (ref 5–25)
CALCIUM SERPL-MCNC: 8.7 MG/DL (ref 8.3–10.1)
CHLORIDE SERPL-SCNC: 103 MMOL/L (ref 100–108)
CHOLEST SERPL-MCNC: 146 MG/DL (ref 50–200)
CLARITY UR: CLEAR
CO2 SERPL-SCNC: 30 MMOL/L (ref 21–32)
COLOR UR: YELLOW
CREAT SERPL-MCNC: 1.15 MG/DL (ref 0.6–1.3)
EOSINOPHIL # BLD AUTO: 0.23 THOUSAND/ΜL (ref 0–0.61)
EOSINOPHIL NFR BLD AUTO: 5 % (ref 0–6)
ERYTHROCYTE [DISTWIDTH] IN BLOOD BY AUTOMATED COUNT: 12.6 % (ref 11.6–15.1)
GFR SERPL CREATININE-BSD FRML MDRD: 69 ML/MIN/1.73SQ M
GLUCOSE P FAST SERPL-MCNC: 102 MG/DL (ref 65–99)
GLUCOSE UR STRIP-MCNC: NEGATIVE MG/DL
HCT VFR BLD AUTO: 42.9 % (ref 36.5–49.3)
HDLC SERPL-MCNC: 43 MG/DL (ref 40–60)
HGB BLD-MCNC: 14.1 G/DL (ref 12–17)
HGB UR QL STRIP.AUTO: NEGATIVE
IMM GRANULOCYTES # BLD AUTO: 0.01 THOUSAND/UL (ref 0–0.2)
IMM GRANULOCYTES NFR BLD AUTO: 0 % (ref 0–2)
KETONES UR STRIP-MCNC: NEGATIVE MG/DL
LDLC SERPL CALC-MCNC: 82 MG/DL (ref 0–100)
LEUKOCYTE ESTERASE UR QL STRIP: NEGATIVE
LYMPHOCYTES # BLD AUTO: 2.22 THOUSANDS/ΜL (ref 0.6–4.47)
LYMPHOCYTES NFR BLD AUTO: 46 % (ref 14–44)
MCH RBC QN AUTO: 30.5 PG (ref 26.8–34.3)
MCHC RBC AUTO-ENTMCNC: 32.9 G/DL (ref 31.4–37.4)
MCV RBC AUTO: 93 FL (ref 82–98)
MONOCYTES # BLD AUTO: 0.48 THOUSAND/ΜL (ref 0.17–1.22)
MONOCYTES NFR BLD AUTO: 10 % (ref 4–12)
NEUTROPHILS # BLD AUTO: 1.78 THOUSANDS/ΜL (ref 1.85–7.62)
NEUTS SEG NFR BLD AUTO: 37 % (ref 43–75)
NITRITE UR QL STRIP: NEGATIVE
NRBC BLD AUTO-RTO: 0 /100 WBCS
PH UR STRIP.AUTO: 6.5 [PH] (ref 4.5–8)
PLATELET # BLD AUTO: 242 THOUSANDS/UL (ref 149–390)
PMV BLD AUTO: 11.2 FL (ref 8.9–12.7)
POTASSIUM SERPL-SCNC: 3.9 MMOL/L (ref 3.5–5.3)
PROT SERPL-MCNC: 7.2 G/DL (ref 6.4–8.2)
PROT UR STRIP-MCNC: NEGATIVE MG/DL
PSA SERPL-MCNC: 0.7 NG/ML (ref 0–4)
RBC # BLD AUTO: 4.62 MILLION/UL (ref 3.88–5.62)
SODIUM SERPL-SCNC: 137 MMOL/L (ref 136–145)
SP GR UR STRIP.AUTO: 1.01 (ref 1–1.03)
TRIGL SERPL-MCNC: 106 MG/DL
UROBILINOGEN UR QL STRIP.AUTO: 0.2 E.U./DL
WBC # BLD AUTO: 4.79 THOUSAND/UL (ref 4.31–10.16)

## 2018-07-11 PROCEDURE — 36415 COLL VENOUS BLD VENIPUNCTURE: CPT

## 2018-07-11 PROCEDURE — 80053 COMPREHEN METABOLIC PANEL: CPT

## 2018-07-11 PROCEDURE — 85025 COMPLETE CBC W/AUTO DIFF WBC: CPT

## 2018-07-11 PROCEDURE — G0103 PSA SCREENING: HCPCS

## 2018-07-11 PROCEDURE — 81003 URINALYSIS AUTO W/O SCOPE: CPT

## 2018-07-11 PROCEDURE — 80061 LIPID PANEL: CPT

## 2018-07-12 ENCOUNTER — APPOINTMENT (OUTPATIENT)
Dept: LAB | Facility: MEDICAL CENTER | Age: 58
End: 2018-07-12
Payer: COMMERCIAL

## 2018-07-12 DIAGNOSIS — I10 ESSENTIAL (PRIMARY) HYPERTENSION: ICD-10-CM

## 2018-07-12 DIAGNOSIS — E66.3 OVERWEIGHT: ICD-10-CM

## 2018-07-12 DIAGNOSIS — R73.02 IMPAIRED GLUCOSE TOLERANCE: ICD-10-CM

## 2018-07-12 DIAGNOSIS — E78.1 PURE HYPERGLYCERIDEMIA: ICD-10-CM

## 2018-07-12 DIAGNOSIS — Z12.11 ENCOUNTER FOR SCREENING FOR MALIGNANT NEOPLASM OF COLON: ICD-10-CM

## 2018-07-12 DIAGNOSIS — Z86.39 PERSONAL HISTORY OF OTHER ENDOCRINE, NUTRITIONAL AND METABOLIC DISEASE: ICD-10-CM

## 2018-07-12 LAB — HEMOCCULT STL QL IA: NEGATIVE

## 2018-07-12 PROCEDURE — G0328 FECAL BLOOD SCRN IMMUNOASSAY: HCPCS

## 2018-07-20 ENCOUNTER — OFFICE VISIT (OUTPATIENT)
Dept: INTERNAL MEDICINE CLINIC | Facility: CLINIC | Age: 58
End: 2018-07-20
Payer: COMMERCIAL

## 2018-07-20 VITALS
BODY MASS INDEX: 24.54 KG/M2 | HEART RATE: 78 BPM | HEIGHT: 75 IN | RESPIRATION RATE: 14 BRPM | OXYGEN SATURATION: 99 % | TEMPERATURE: 98.4 F | SYSTOLIC BLOOD PRESSURE: 126 MMHG | WEIGHT: 197.4 LBS | DIASTOLIC BLOOD PRESSURE: 74 MMHG

## 2018-07-20 DIAGNOSIS — I10 ESSENTIAL HYPERTENSION: ICD-10-CM

## 2018-07-20 DIAGNOSIS — Z00.00 HEALTH MAINTENANCE EXAMINATION: Primary | ICD-10-CM

## 2018-07-20 DIAGNOSIS — N48.6 PEYRONIE DISEASE: ICD-10-CM

## 2018-07-20 DIAGNOSIS — E78.5 HYPERLIPIDEMIA, UNSPECIFIED HYPERLIPIDEMIA TYPE: ICD-10-CM

## 2018-07-20 DIAGNOSIS — N52.9 ERECTILE DYSFUNCTION, UNSPECIFIED ERECTILE DYSFUNCTION TYPE: ICD-10-CM

## 2018-07-20 DIAGNOSIS — E74.39 GLUCOSE INTOLERANCE: ICD-10-CM

## 2018-07-20 PROCEDURE — 93000 ELECTROCARDIOGRAM COMPLETE: CPT | Performed by: INTERNAL MEDICINE

## 2018-07-20 PROCEDURE — 99396 PREV VISIT EST AGE 40-64: CPT | Performed by: INTERNAL MEDICINE

## 2018-07-20 RX ORDER — VITAMIN E (DL,TOCOPHERYL ACET) 180 MG
CAPSULE ORAL
COMMUNITY
Start: 2017-07-01

## 2018-07-20 RX ORDER — UBIDECARENONE 50 MG
CAPSULE ORAL
COMMUNITY
End: 2021-02-05 | Stop reason: ALTCHOICE

## 2018-07-20 RX ORDER — TADALAFIL 10 MG/1
10 TABLET ORAL DAILY PRN
Qty: 6 TABLET | Refills: 6 | Status: SHIPPED | OUTPATIENT
Start: 2018-07-20 | End: 2019-01-14 | Stop reason: SDUPTHER

## 2018-07-20 NOTE — PROGRESS NOTES
Assessment/Plan:    Essential hypertension  Hypertension continues under good control with blood pressure today of 126/74 he is encouraged to continue on lisinopril 10 milligrams daily follow-up testing was requested for 6 months  Low-salt diet regular exercise recommended  Peyronie disease  Peyronie disease results of the patient's consultation with Urology were reviewed  At the present time he is not interested in any further treatment to address this problem  Erectile dysfunction  History of erectile dysfunction  Patient has previously used Viagra and Levitra  He finds the Levitra to be reasonably effective but would like to try Cialis in its place  We provided him with a prescription for 10 milligram pills he can take half a pill for the 1st trial if it does not work affective we then a full pill  It appears that his prescription plan may dictate a return to Viagra in the near future as the only covered erectile dysfunction medication  Glucose intolerance  Mild glucose intolerance fasting blood sugar noted to be 102 lifestyle adjustments for improvement would include regular exercise weight reduction and a careful carbohydrate consumption  Follow-up testing requested for 6 months  Hyperlipidemia  History of hyperlipidemia values have improved with the addition of red rice yeast   Patient's diet appears to be reasonably good pertaining to cholesterol and triglycerides recommend continuation on present diet pertaining to these components  Recommend follow-up testing in 6 months  Benefits of cholesterol control reviewed with the patient today  Diagnoses and all orders for this visit:    Health maintenance examination  -     POCT ECG    Erectile dysfunction, unspecified erectile dysfunction type  -     tadalafil (CIALIS) 10 MG tablet;  Take 1 tablet (10 mg total) by mouth daily as needed for erectile dysfunction    Hyperlipidemia, unspecified hyperlipidemia type  -     Lipid panel; Future    Glucose intolerance  -     Comprehensive metabolic panel; Future    Peyronie disease    Other orders  -     Black Pepper-Turmeric (TURMERIC COMPLEX/BLACK PEPPER) 3-500 MG CAPS;   -     Red Yeast Rice 600 MG TABS;   -     QUERCETIN PO;   -     Capsicum-Garlic (CAYENNE PLUS GARLIC PO); Subjective:      Patient ID: Savanah Merrill is a 60 y o  male  This is an annual health maintenance examination review of blood work electrocardiogram and blood pressure checkup for this 48-year-old gentleman  He continues to live a healthy lifestyle with regular exercise and healthy diet  Since his last visit with us he has initiated red rice E supplementation in an effort to reduce his cholesterol values  He is also adjusted his exercise routine slightly downward since his last visit with us as well  He has no symptoms attributed to his hypertension at this time and he is compliant with his blood pressure medication  The following portions of the patient's history were reviewed and updated as appropriate: He  has no past medical history on file  He  has a past surgical history that includes Gallbladder surgery and Tonsillectomy (1963)  His family history includes Diabetes in his father; Heart disease in his father; Hypertension in his brother, father, mother, and sister; Kidney cancer in his brother; Osteoporosis in his mother and sister; Other in his brother  He  reports that he has never smoked  He has never used smokeless tobacco  He reports that he drinks about 1 2 oz of alcohol per week   His drug history is not on file       Review of Systems   All other systems reviewed and are negative  Objective:      /74   Pulse 78   Temp 98 4 °F (36 9 °C)   Resp 14   Ht 6' 3" (1 905 m)   Wt 89 5 kg (197 lb 6 4 oz)   SpO2 99%   BMI 24 67 kg/m²          Physical Exam   Constitutional: He is oriented to person, place, and time  He appears well-developed and well-nourished  No distress  HENT:   Head: Normocephalic and atraumatic  Right Ear: Hearing, tympanic membrane, external ear and ear canal normal    Left Ear: Hearing, tympanic membrane, external ear and ear canal normal    Nose: Nose normal    Mouth/Throat: Oropharynx is clear and moist and mucous membranes are normal    Eyes: Conjunctivae are normal  Pupils are equal, round, and reactive to light  Right eye exhibits no discharge  Left eye exhibits discharge  Neck: JVD present  No tracheal deviation present  No thyromegaly present  Cardiovascular: Normal rate, regular rhythm, S1 normal, S2 normal, normal heart sounds and intact distal pulses  No murmur heard  Pulmonary/Chest: Effort normal and breath sounds normal  No stridor  No respiratory distress  He has no wheezes  He has no rales  He exhibits no tenderness  Abdominal: Soft  Bowel sounds are normal  He exhibits no distension and no mass  There is no tenderness  There is no rebound and no guarding  Hernia confirmed negative in the right inguinal area and confirmed negative in the left inguinal area  Genitourinary: Rectum normal, prostate normal, testes normal and penis normal    Musculoskeletal: Normal range of motion  He exhibits no edema, tenderness or deformity  Lymphadenopathy:     He has no cervical adenopathy  Right: No inguinal adenopathy present  Left: No inguinal adenopathy present  Neurological: He is alert and oriented to person, place, and time  He has normal reflexes  He displays normal reflexes  No cranial nerve deficit  He exhibits normal muscle tone  Coordination normal    Skin: Skin is warm and dry  No rash noted  He is not diaphoretic  No erythema  No pallor  Psychiatric: He has a normal mood and affect   His behavior is normal  Judgment and thought content normal

## 2018-07-20 NOTE — ASSESSMENT & PLAN NOTE
Peyronie disease results of the patient's consultation with Urology were reviewed  At the present time he is not interested in any further treatment to address this problem

## 2018-07-20 NOTE — ASSESSMENT & PLAN NOTE
Hypertension continues under good control with blood pressure today of 126/74 he is encouraged to continue on lisinopril 10 milligrams daily follow-up testing was requested for 6 months  Low-salt diet regular exercise recommended

## 2018-07-20 NOTE — ASSESSMENT & PLAN NOTE
History of hyperlipidemia values have improved with the addition of red rice yeast   Patient's diet appears to be reasonably good pertaining to cholesterol and triglycerides recommend continuation on present diet pertaining to these components  Recommend follow-up testing in 6 months  Benefits of cholesterol control reviewed with the patient today

## 2018-07-20 NOTE — ASSESSMENT & PLAN NOTE
History of erectile dysfunction  Patient has previously used Viagra and Levitra  He finds the Levitra to be reasonably effective but would like to try Cialis in its place  We provided him with a prescription for 10 milligram pills he can take half a pill for the 1st trial if it does not work affective we then a full pill  It appears that his prescription plan may dictate a return to Viagra in the near future as the only covered erectile dysfunction medication

## 2018-07-20 NOTE — ASSESSMENT & PLAN NOTE
Mild glucose intolerance fasting blood sugar noted to be 102 lifestyle adjustments for improvement would include regular exercise weight reduction and a careful carbohydrate consumption  Follow-up testing requested for 6 months

## 2019-01-02 DIAGNOSIS — I10 ESSENTIAL HYPERTENSION: Primary | ICD-10-CM

## 2019-01-02 RX ORDER — LISINOPRIL 10 MG/1
10 TABLET ORAL DAILY
Qty: 30 TABLET | Refills: 5 | Status: SHIPPED | OUTPATIENT
Start: 2019-01-02 | End: 2019-01-14 | Stop reason: SDUPTHER

## 2019-01-09 ENCOUNTER — APPOINTMENT (OUTPATIENT)
Dept: LAB | Facility: MEDICAL CENTER | Age: 59
End: 2019-01-09
Payer: COMMERCIAL

## 2019-01-09 DIAGNOSIS — E74.39 GLUCOSE INTOLERANCE: ICD-10-CM

## 2019-01-09 DIAGNOSIS — E78.5 HYPERLIPIDEMIA, UNSPECIFIED HYPERLIPIDEMIA TYPE: ICD-10-CM

## 2019-01-09 LAB
ALBUMIN SERPL BCP-MCNC: 3.9 G/DL (ref 3.5–5)
ALP SERPL-CCNC: 76 U/L (ref 46–116)
ALT SERPL W P-5'-P-CCNC: 41 U/L (ref 12–78)
ANION GAP SERPL CALCULATED.3IONS-SCNC: 7 MMOL/L (ref 4–13)
AST SERPL W P-5'-P-CCNC: 20 U/L (ref 5–45)
BILIRUB SERPL-MCNC: 0.39 MG/DL (ref 0.2–1)
BUN SERPL-MCNC: 17 MG/DL (ref 5–25)
CALCIUM SERPL-MCNC: 8.8 MG/DL (ref 8.3–10.1)
CHLORIDE SERPL-SCNC: 101 MMOL/L (ref 100–108)
CHOLEST SERPL-MCNC: 164 MG/DL (ref 50–200)
CO2 SERPL-SCNC: 29 MMOL/L (ref 21–32)
CREAT SERPL-MCNC: 1.12 MG/DL (ref 0.6–1.3)
GFR SERPL CREATININE-BSD FRML MDRD: 72 ML/MIN/1.73SQ M
GLUCOSE P FAST SERPL-MCNC: 98 MG/DL (ref 65–99)
HDLC SERPL-MCNC: 49 MG/DL (ref 40–60)
LDLC SERPL CALC-MCNC: 104 MG/DL (ref 0–100)
NONHDLC SERPL-MCNC: 115 MG/DL
POTASSIUM SERPL-SCNC: 3.9 MMOL/L (ref 3.5–5.3)
PROT SERPL-MCNC: 7.1 G/DL (ref 6.4–8.2)
SODIUM SERPL-SCNC: 137 MMOL/L (ref 136–145)
TRIGL SERPL-MCNC: 54 MG/DL

## 2019-01-09 PROCEDURE — 80061 LIPID PANEL: CPT

## 2019-01-09 PROCEDURE — 36415 COLL VENOUS BLD VENIPUNCTURE: CPT

## 2019-01-09 PROCEDURE — 80053 COMPREHEN METABOLIC PANEL: CPT

## 2019-01-14 ENCOUNTER — OFFICE VISIT (OUTPATIENT)
Dept: INTERNAL MEDICINE CLINIC | Facility: CLINIC | Age: 59
End: 2019-01-14
Payer: COMMERCIAL

## 2019-01-14 VITALS
DIASTOLIC BLOOD PRESSURE: 86 MMHG | SYSTOLIC BLOOD PRESSURE: 132 MMHG | TEMPERATURE: 96.9 F | OXYGEN SATURATION: 98 % | HEIGHT: 75 IN | WEIGHT: 210.6 LBS | HEART RATE: 92 BPM | RESPIRATION RATE: 14 BRPM | BODY MASS INDEX: 26.19 KG/M2

## 2019-01-14 DIAGNOSIS — N52.9 ERECTILE DYSFUNCTION, UNSPECIFIED ERECTILE DYSFUNCTION TYPE: ICD-10-CM

## 2019-01-14 DIAGNOSIS — Z12.11 COLON CANCER SCREENING: ICD-10-CM

## 2019-01-14 DIAGNOSIS — I10 ESSENTIAL HYPERTENSION: Primary | ICD-10-CM

## 2019-01-14 DIAGNOSIS — Z12.5 PROSTATE CANCER SCREENING: ICD-10-CM

## 2019-01-14 DIAGNOSIS — E74.39 GLUCOSE INTOLERANCE: ICD-10-CM

## 2019-01-14 DIAGNOSIS — E78.5 HYPERLIPIDEMIA, UNSPECIFIED HYPERLIPIDEMIA TYPE: ICD-10-CM

## 2019-01-14 PROCEDURE — 3079F DIAST BP 80-89 MM HG: CPT | Performed by: INTERNAL MEDICINE

## 2019-01-14 PROCEDURE — 3008F BODY MASS INDEX DOCD: CPT | Performed by: INTERNAL MEDICINE

## 2019-01-14 PROCEDURE — 3075F SYST BP GE 130 - 139MM HG: CPT | Performed by: INTERNAL MEDICINE

## 2019-01-14 PROCEDURE — 99214 OFFICE O/P EST MOD 30 MIN: CPT | Performed by: INTERNAL MEDICINE

## 2019-01-14 RX ORDER — TADALAFIL 10 MG/1
10 TABLET ORAL DAILY PRN
Qty: 6 TABLET | Refills: 6 | Status: SHIPPED | OUTPATIENT
Start: 2019-01-14 | End: 2019-07-25 | Stop reason: SDUPTHER

## 2019-01-14 RX ORDER — LISINOPRIL 10 MG/1
10 TABLET ORAL DAILY
Qty: 90 TABLET | Refills: 3 | Status: SHIPPED | OUTPATIENT
Start: 2019-01-14 | End: 2020-01-30 | Stop reason: SDUPTHER

## 2019-01-14 RX ORDER — VARDENAFIL HYDROCHLORIDE 10 MG/1
10 TABLET ORAL
COMMUNITY
End: 2019-07-25

## 2019-01-14 NOTE — ASSESSMENT & PLAN NOTE
History of hyperlipidemia mild upward trend in cholesterol and LDL values  Patient will continue to work on regular exercise healthy diet and weight reduction follow-up testing requested in 6 months

## 2019-01-14 NOTE — ASSESSMENT & PLAN NOTE
Hypertension were reviewed the home blood pressure readings patient brought with him today as well as our reading today I believe he is adequately controlled on 10 mg of lisinopril  Follow-up testing is requested in 6 months

## 2019-01-14 NOTE — ASSESSMENT & PLAN NOTE
History of erectile dysfunction patient finds a benefit from Cialis 10 mg p r n  new prescription provided today

## 2019-01-14 NOTE — PROGRESS NOTES
Assessment/Plan:    Essential hypertension  Hypertension were reviewed the home blood pressure readings patient brought with him today as well as our reading today I believe he is adequately controlled on 10 mg of lisinopril  Follow-up testing is requested in 6 months  Erectile dysfunction  History of erectile dysfunction patient finds a benefit from Cialis 10 mg p r n  new prescription provided today    Glucose intolerance  Mild glucose intolerance noted on the patient's blood sugar testing previously  Blood sugar today 98  Continue regular exercise and careful carbohydrate consumption  Hyperlipidemia  History of hyperlipidemia mild upward trend in cholesterol and LDL values  Patient will continue to work on regular exercise healthy diet and weight reduction follow-up testing requested in 6 months  Diagnoses and all orders for this visit:    Essential hypertension  -     Comprehensive metabolic panel; Future  -     UA w Reflex to Microscopic w Reflex to Culture  -     lisinopril (ZESTRIL) 10 mg tablet; Take 1 tablet (10 mg total) by mouth daily    Glucose intolerance  -     Comprehensive metabolic panel; Future    Hyperlipidemia, unspecified hyperlipidemia type  -     Lipid panel; Future    Prostate cancer screening  -     PSA, Total Screen; Future    Colon cancer screening  -     Occult Blood, Fecal Immunochemical; Future    Erectile dysfunction, unspecified erectile dysfunction type  -     tadalafil (CIALIS) 10 MG tablet; Take 1 tablet (10 mg total) by mouth daily as needed for erectile dysfunction    Other orders  -     vardenafil (LEVITRA) 10 MG tablet; Take 10 mg by mouth        Subjective:      Patient ID: Kanu Davis is a 60 y o  male  This 58-year-old gentleman returns today for his 6 month blood pressure and blood testing follow-up  He has a history of hypertension as well as hyperlipidemia    He has a somewhat upward trend in his lipid profile which corresponds with his increasing weight  Does admit to dietary indiscretions over the past several months through the holiday season  He is now at exercising on a daily basis and has addressed his diet with reduction and calories  Hopefully over the next 6 months his weight and cholesterol values will both improved  The following portions of the patient's history were reviewed and updated as appropriate:   He  has no past medical history on file  He   Patient Active Problem List    Diagnosis Date Noted    Hyperlipidemia 07/20/2018    Erectile dysfunction 07/20/2018    Glucose intolerance 07/20/2018    Essential hypertension 07/20/2018    Peyronie disease 02/19/2018     He  has a past surgical history that includes Gallbladder surgery and Tonsillectomy (1963)  His family history includes Diabetes in his father; Heart disease in his father; Hypertension in his brother, father, mother, and sister; Kidney cancer in his brother; Osteoporosis in his mother and sister; Other in his brother  He  reports that he has never smoked  He has never used smokeless tobacco  He reports that he drinks about 1 2 oz of alcohol per week   His drug history is not on file  Current Outpatient Prescriptions   Medication Sig Dispense Refill    aspirin 81 MG tablet Take by mouth      Black Pepper-Turmeric (TURMERIC COMPLEX/BLACK PEPPER) 3-500 MG CAPS       Capsicum-Garlic (CAYENNE PLUS GARLIC PO)       Krill Oil 1000 MG CAPS Take by mouth      lisinopril (ZESTRIL) 10 mg tablet Take 1 tablet (10 mg total) by mouth daily 90 tablet 3    Multiple Vitamins-Minerals (MENS MULTIPLUS PO) Take by mouth      QUERCETIN PO       Red Yeast Rice 600 MG TABS       tadalafil (CIALIS) 10 MG tablet Take 1 tablet (10 mg total) by mouth daily as needed for erectile dysfunction 6 tablet 6    vardenafil (LEVITRA) 10 MG tablet Take 10 mg by mouth       No current facility-administered medications for this visit           Review of Systems   All other systems reviewed and are negative  Objective:      /86   Pulse 92   Temp (!) 96 9 °F (36 1 °C)   Resp 14   Ht 6' 3" (1 905 m)   Wt 95 5 kg (210 lb 9 6 oz)   SpO2 98%   BMI 26 32 kg/m²          Physical Exam   Constitutional: He is oriented to person, place, and time  He appears well-developed and well-nourished  HENT:   Right Ear: Hearing, tympanic membrane, external ear and ear canal normal    Left Ear: Hearing, tympanic membrane, external ear and ear canal normal    Nose: Nose normal    Mouth/Throat: Oropharynx is clear and moist and mucous membranes are normal    Eyes: Pupils are equal, round, and reactive to light  Conjunctivae are normal    Neck: No thyromegaly present  Cardiovascular: Normal rate, regular rhythm, S1 normal, S2 normal, normal heart sounds and intact distal pulses  No murmur heard  Pulmonary/Chest: Effort normal and breath sounds normal    Abdominal: Soft  Bowel sounds are normal  There is no tenderness  Musculoskeletal: Normal range of motion  He exhibits no edema  Lymphadenopathy:     He has no cervical adenopathy  Neurological: He is alert and oriented to person, place, and time  He has normal reflexes  Skin: Skin is warm and dry  Psychiatric: He has a normal mood and affect   His behavior is normal  Judgment and thought content normal

## 2019-01-14 NOTE — ASSESSMENT & PLAN NOTE
Mild glucose intolerance noted on the patient's blood sugar testing previously  Blood sugar today 98  Continue regular exercise and careful carbohydrate consumption

## 2019-07-23 ENCOUNTER — APPOINTMENT (OUTPATIENT)
Dept: LAB | Facility: CLINIC | Age: 59
End: 2019-07-23
Payer: COMMERCIAL

## 2019-07-23 DIAGNOSIS — Z12.5 PROSTATE CANCER SCREENING: ICD-10-CM

## 2019-07-23 DIAGNOSIS — E78.5 HYPERLIPIDEMIA, UNSPECIFIED HYPERLIPIDEMIA TYPE: ICD-10-CM

## 2019-07-23 LAB
BILIRUB UR QL STRIP: NEGATIVE
CHOLEST SERPL-MCNC: 195 MG/DL (ref 50–200)
CLARITY UR: NORMAL
COLOR UR: NORMAL
GLUCOSE UR STRIP-MCNC: NEGATIVE MG/DL
HDLC SERPL-MCNC: 48 MG/DL (ref 40–60)
HGB UR QL STRIP.AUTO: NEGATIVE
KETONES UR STRIP-MCNC: NEGATIVE MG/DL
LDLC SERPL CALC-MCNC: 120 MG/DL (ref 0–100)
LEUKOCYTE ESTERASE UR QL STRIP: NEGATIVE
NITRITE UR QL STRIP: NEGATIVE
NONHDLC SERPL-MCNC: 147 MG/DL
PH UR STRIP.AUTO: 5.5 [PH]
PROT UR STRIP-MCNC: NEGATIVE MG/DL
PSA SERPL-MCNC: 0.8 NG/ML (ref 0–4)
SP GR UR STRIP.AUTO: 1.03 (ref 1–1.03)
TRIGL SERPL-MCNC: 137 MG/DL
UROBILINOGEN UR QL STRIP.AUTO: 0.2 E.U./DL

## 2019-07-23 PROCEDURE — G0103 PSA SCREENING: HCPCS

## 2019-07-23 PROCEDURE — 80061 LIPID PANEL: CPT

## 2019-07-23 PROCEDURE — 81003 URINALYSIS AUTO W/O SCOPE: CPT | Performed by: INTERNAL MEDICINE

## 2019-07-23 PROCEDURE — 36415 COLL VENOUS BLD VENIPUNCTURE: CPT

## 2019-07-24 ENCOUNTER — APPOINTMENT (OUTPATIENT)
Dept: LAB | Facility: CLINIC | Age: 59
End: 2019-07-24
Payer: COMMERCIAL

## 2019-07-24 DIAGNOSIS — Z12.11 COLON CANCER SCREENING: ICD-10-CM

## 2019-07-24 LAB — HEMOCCULT STL QL IA: NEGATIVE

## 2019-07-24 PROCEDURE — G0328 FECAL BLOOD SCRN IMMUNOASSAY: HCPCS

## 2019-07-25 ENCOUNTER — OFFICE VISIT (OUTPATIENT)
Dept: INTERNAL MEDICINE CLINIC | Facility: CLINIC | Age: 59
End: 2019-07-25
Payer: COMMERCIAL

## 2019-07-25 VITALS
DIASTOLIC BLOOD PRESSURE: 82 MMHG | TEMPERATURE: 98.5 F | OXYGEN SATURATION: 97 % | HEIGHT: 75 IN | RESPIRATION RATE: 14 BRPM | BODY MASS INDEX: 25.44 KG/M2 | WEIGHT: 204.6 LBS | SYSTOLIC BLOOD PRESSURE: 138 MMHG | HEART RATE: 93 BPM

## 2019-07-25 DIAGNOSIS — E78.5 HYPERLIPIDEMIA, UNSPECIFIED HYPERLIPIDEMIA TYPE: ICD-10-CM

## 2019-07-25 DIAGNOSIS — I10 ESSENTIAL HYPERTENSION: ICD-10-CM

## 2019-07-25 DIAGNOSIS — N52.9 ERECTILE DYSFUNCTION, UNSPECIFIED ERECTILE DYSFUNCTION TYPE: ICD-10-CM

## 2019-07-25 DIAGNOSIS — Z12.11 COLON CANCER SCREENING: Primary | ICD-10-CM

## 2019-07-25 PROBLEM — R73.09 ABNORMAL GLUCOSE: Status: ACTIVE | Noted: 2018-07-20

## 2019-07-25 PROCEDURE — 99215 OFFICE O/P EST HI 40 MIN: CPT | Performed by: INTERNAL MEDICINE

## 2019-07-25 PROCEDURE — 3008F BODY MASS INDEX DOCD: CPT | Performed by: INTERNAL MEDICINE

## 2019-07-25 PROCEDURE — 3075F SYST BP GE 130 - 139MM HG: CPT | Performed by: INTERNAL MEDICINE

## 2019-07-25 PROCEDURE — 1036F TOBACCO NON-USER: CPT | Performed by: INTERNAL MEDICINE

## 2019-07-25 RX ORDER — CHLORHEXIDINE GLUCONATE 0.12 MG/ML
15 RINSE ORAL 2 TIMES DAILY
Refills: 0 | COMMUNITY
Start: 2019-06-18 | End: 2019-07-25 | Stop reason: ALTCHOICE

## 2019-07-25 RX ORDER — NIACIN 250 MG
TABLET ORAL
COMMUNITY

## 2019-07-25 RX ORDER — TADALAFIL 20 MG/1
10 TABLET ORAL DAILY PRN
Qty: 6 TABLET | Refills: 6 | Status: SHIPPED | OUTPATIENT
Start: 2019-07-25 | End: 2020-07-28 | Stop reason: SDUPTHER

## 2019-07-25 RX ORDER — CLINDAMYCIN HYDROCHLORIDE 300 MG/1
300 CAPSULE ORAL EVERY 8 HOURS
Refills: 0 | COMMUNITY
Start: 2019-06-07 | End: 2019-07-25 | Stop reason: ALTCHOICE

## 2019-07-25 RX ORDER — AZITHROMYCIN 250 MG/1
TABLET, FILM COATED ORAL
COMMUNITY
Start: 2016-04-10 | End: 2019-07-25 | Stop reason: ALTCHOICE

## 2019-07-25 RX ORDER — PETROLATUM,WHITE/LANOLIN
OINTMENT (GRAM) TOPICAL
COMMUNITY

## 2019-07-25 NOTE — ASSESSMENT & PLAN NOTE
History of mild abnormal glucoses in the past   Of comprehensive metabolic profile is pending at this time will review the results with the patient when they are obtain

## 2019-07-25 NOTE — ASSESSMENT & PLAN NOTE
History of erectile dysfunction patient requested refills on his Cialis medication which has been affect have to relieve his erectile dysfunction

## 2019-07-25 NOTE — PROGRESS NOTES
Assessment/Plan:    Essential hypertension  The patient's hypertension was assessed today and felt to be in good control of recommend the continuation of his present therapy which is lisinopril 10 mg daily  He has no apparent side effects of the medication  He reports no apparent symptoms of uncontrolled hypertension or hypotension  Erectile dysfunction  History of erectile dysfunction patient requested refills on his Cialis medication which has been affect have to relieve his erectile dysfunction  Hyperlipidemia  Lipid profile was reviewed with the patient today it does show an elevated LDL of 120  I have recommended that he work on reducing weight maintaining regular exercise with follow-up studies in 6 months       Abnormal glucose  History of mild abnormal glucoses in the past   Of comprehensive metabolic profile is pending at this time will review the results with the patient when they are obtain  Diagnoses and all orders for this visit:    Colon cancer screening  -     Ambulatory referral to Colorectal Surgery; Future    Hyperlipidemia, unspecified hyperlipidemia type  -     Cancel: Lipid panel; Future    Essential hypertension  -     Comprehensive metabolic panel; Future    Erectile dysfunction, unspecified erectile dysfunction type  -     tadalafil (CIALIS) 20 MG tablet; Take 0 5 tablets (10 mg total) by mouth daily as needed for erectile dysfunction    Other orders  -     Discontinue: azithromycin (ZITHROMAX) 250 mg tablet; Take 2 tablets by mouth on day one; then one tablet daily for 4 days  -     Discontinue: chlorhexidine (PERIDEX) 0 12 % solution; Apply 15 mL to the mouth or throat 2 (two) times a day   -     Discontinue: clindamycin (CLEOCIN) 300 MG capsule; Take 300 mg by mouth every 8 (eight) hours  -     Misc Natural Products (GLUCOS-CHONDROIT-MSM COMPLEX) TABS  -     niacin 250 MG tablet        Subjective:      Patient ID: George Leong is a 60 y o  male      This 18-year-old gentleman presents today for an annual physical examination review of blood work  He has a history of hypertension as well as hyperlipidemia and occasional glucose elevation  He presents with no symptoms or complaints on today's visit  He denies any cardiac symptoms of chest pain palpitations or shortness of breath  He brings with him today a diary of home blood pressure readings which appears to indicate very stable control of his hypertension with systolic readings running from the low 100s to 405 and diastolic readings in the 79W to low 80s  The following portions of the patient's history were reviewed and updated as appropriate:   He  has no past medical history on file  He   Patient Active Problem List    Diagnosis Date Noted    Hyperlipidemia 07/20/2018    Erectile dysfunction 07/20/2018    Abnormal glucose 07/20/2018    Essential hypertension 07/20/2018    Peyronie disease 02/19/2018     He  has a past surgical history that includes Gallbladder surgery and Tonsillectomy (1963)  His family history includes Diabetes in his father; Heart disease in his father; Hypertension in his brother, father, mother, and sister; Kidney cancer in his brother; Osteoporosis in his mother and sister; Other in his brother  He  reports that he has never smoked  He has never used smokeless tobacco  He reports that he drinks about 2 0 standard drinks of alcohol per week  His drug history is not on file    Current Outpatient Medications   Medication Sig Dispense Refill    Black Pepper-Turmeric (TURMERIC COMPLEX/BLACK PEPPER) 3-500 MG CAPS       Capsicum-Garlic (CAYENNE PLUS GARLIC PO)       Krill Oil 1000 MG CAPS Take by mouth      lisinopril (ZESTRIL) 10 mg tablet Take 1 tablet (10 mg total) by mouth daily 90 tablet 3    Misc Natural Products (GLUCOS-CHONDROIT-MSM COMPLEX) TABS       Multiple Vitamins-Minerals (MENS MULTIPLUS PO) Take by mouth      niacin 250 MG tablet       QUERCETIN PO       Red Yeast Rice 600 MG TABS       tadalafil (CIALIS) 20 MG tablet Take 0 5 tablets (10 mg total) by mouth daily as needed for erectile dysfunction 6 tablet 6     No current facility-administered medications for this visit       Review of Systems   All other systems reviewed and are negative  Objective:      /82   Pulse 93   Temp 98 5 °F (36 9 °C)   Resp 14   Ht 6' 3" (1 905 m)   Wt 92 8 kg (204 lb 9 6 oz)   SpO2 97%   BMI 25 57 kg/m²          Physical Exam   Constitutional: He is oriented to person, place, and time  He appears well-developed and well-nourished  HENT:   Right Ear: Hearing, tympanic membrane, external ear and ear canal normal    Left Ear: Hearing, tympanic membrane, external ear and ear canal normal    Nose: Nose normal    Mouth/Throat: Oropharynx is clear and moist and mucous membranes are normal    Eyes: Pupils are equal, round, and reactive to light  Conjunctivae are normal  Right eye exhibits no discharge  Left eye exhibits no discharge  No scleral icterus  Neck: No thyromegaly present  Cardiovascular: Normal rate, regular rhythm, S1 normal, S2 normal, normal heart sounds and intact distal pulses  No murmur heard  Pulmonary/Chest: Effort normal and breath sounds normal  No stridor  No respiratory distress  He has no wheezes  He has no rales  Abdominal: Soft  Bowel sounds are normal  He exhibits no distension  There is no tenderness  There is no guarding  Hernia confirmed negative in the right inguinal area and confirmed negative in the left inguinal area  Genitourinary: Rectum normal, prostate normal, testes normal and penis normal    Musculoskeletal: Normal range of motion  He exhibits no edema  Lymphadenopathy:     He has no cervical adenopathy  No inguinal adenopathy noted on the right or left side  Neurological: He is alert and oriented to person, place, and time  He has normal reflexes  He displays normal reflexes  Skin: Skin is warm and dry     Psychiatric: He has a normal mood and affect  His behavior is normal  Judgment and thought content normal      BMI Counseling: Body mass index is 25 57 kg/m²  Discussed the patient's BMI with him  The BMI is above average  BMI counseling and education was provided to the patient  Nutrition recommendations include reducing portion sizes, moderation in carbohydrate intake, increasing intake of lean protein, reducing intake of saturated fat and trans fat and reducing intake of cholesterol  Exercise recommendations include exercising 3-5 times per week

## 2019-07-25 NOTE — ASSESSMENT & PLAN NOTE
The patient's hypertension was assessed today and felt to be in good control of recommend the continuation of his present therapy which is lisinopril 10 mg daily  He has no apparent side effects of the medication  He reports no apparent symptoms of uncontrolled hypertension or hypotension

## 2019-07-25 NOTE — ASSESSMENT & PLAN NOTE
Lipid profile was reviewed with the patient today it does show an elevated LDL of 120  I have recommended that he work on reducing weight maintaining regular exercise with follow-up studies in 6 months  Mahsa Goldstein

## 2019-08-01 ENCOUNTER — APPOINTMENT (OUTPATIENT)
Dept: LAB | Facility: CLINIC | Age: 59
End: 2019-08-01
Payer: COMMERCIAL

## 2019-08-01 DIAGNOSIS — I10 ESSENTIAL HYPERTENSION: ICD-10-CM

## 2019-08-01 LAB
ALBUMIN SERPL BCP-MCNC: 3.8 G/DL (ref 3.5–5)
ALP SERPL-CCNC: 82 U/L (ref 46–116)
ALT SERPL W P-5'-P-CCNC: 32 U/L (ref 12–78)
ANION GAP SERPL CALCULATED.3IONS-SCNC: 5 MMOL/L (ref 4–13)
AST SERPL W P-5'-P-CCNC: 18 U/L (ref 5–45)
BILIRUB SERPL-MCNC: 0.45 MG/DL (ref 0.2–1)
BUN SERPL-MCNC: 13 MG/DL (ref 5–25)
CALCIUM SERPL-MCNC: 9 MG/DL (ref 8.3–10.1)
CHLORIDE SERPL-SCNC: 103 MMOL/L (ref 100–108)
CO2 SERPL-SCNC: 31 MMOL/L (ref 21–32)
CREAT SERPL-MCNC: 1.02 MG/DL (ref 0.6–1.3)
GFR SERPL CREATININE-BSD FRML MDRD: 80 ML/MIN/1.73SQ M
GLUCOSE P FAST SERPL-MCNC: 109 MG/DL (ref 65–99)
POTASSIUM SERPL-SCNC: 4.1 MMOL/L (ref 3.5–5.3)
PROT SERPL-MCNC: 7.2 G/DL (ref 6.4–8.2)
SODIUM SERPL-SCNC: 139 MMOL/L (ref 136–145)

## 2019-08-01 PROCEDURE — 36415 COLL VENOUS BLD VENIPUNCTURE: CPT

## 2019-08-01 PROCEDURE — 80053 COMPREHEN METABOLIC PANEL: CPT

## 2020-01-20 ENCOUNTER — TELEPHONE (OUTPATIENT)
Dept: INTERNAL MEDICINE CLINIC | Facility: CLINIC | Age: 60
End: 2020-01-20

## 2020-01-20 DIAGNOSIS — Z12.11 SCREENING FOR COLON CANCER: ICD-10-CM

## 2020-01-20 DIAGNOSIS — Z13.0 SCREENING FOR DEFICIENCY ANEMIA: Primary | ICD-10-CM

## 2020-01-20 DIAGNOSIS — I10 ESSENTIAL HYPERTENSION: ICD-10-CM

## 2020-01-20 DIAGNOSIS — E78.5 HYPERLIPIDEMIA, UNSPECIFIED HYPERLIPIDEMIA TYPE: ICD-10-CM

## 2020-01-20 DIAGNOSIS — Z12.5 SCREENING FOR PROSTATE CANCER: ICD-10-CM

## 2020-01-20 NOTE — TELEPHONE ENCOUNTER
Orders orders have been entered into the system for the patient's blood work please have him fast for 10-12 hours a night before thank you

## 2020-01-20 NOTE — TELEPHONE ENCOUNTER
Pt has a 6 month f/u with labs scheduled for 1/30/20  Please generate lab order and give pt a call back at [phone] once generated  Pt uses [facility]  Thank you!

## 2020-01-22 NOTE — TELEPHONE ENCOUNTER
Informed pt that lab order has been generated and instructed pt to fast 10-12 hours prior to getting lab drawn  Thank you!

## 2020-01-27 ENCOUNTER — APPOINTMENT (OUTPATIENT)
Dept: LAB | Facility: CLINIC | Age: 60
End: 2020-01-27
Payer: COMMERCIAL

## 2020-01-27 DIAGNOSIS — I10 ESSENTIAL HYPERTENSION: ICD-10-CM

## 2020-01-27 DIAGNOSIS — E78.5 HYPERLIPIDEMIA, UNSPECIFIED HYPERLIPIDEMIA TYPE: ICD-10-CM

## 2020-01-27 LAB
ALBUMIN SERPL BCP-MCNC: 3.6 G/DL (ref 3.5–5)
ALP SERPL-CCNC: 84 U/L (ref 46–116)
ALT SERPL W P-5'-P-CCNC: 40 U/L (ref 12–78)
ANION GAP SERPL CALCULATED.3IONS-SCNC: 4 MMOL/L (ref 4–13)
AST SERPL W P-5'-P-CCNC: 23 U/L (ref 5–45)
BILIRUB SERPL-MCNC: 0.39 MG/DL (ref 0.2–1)
BUN SERPL-MCNC: 14 MG/DL (ref 5–25)
CALCIUM SERPL-MCNC: 9.2 MG/DL (ref 8.3–10.1)
CHLORIDE SERPL-SCNC: 105 MMOL/L (ref 100–108)
CHOLEST SERPL-MCNC: 146 MG/DL (ref 50–200)
CO2 SERPL-SCNC: 30 MMOL/L (ref 21–32)
CREAT SERPL-MCNC: 1.03 MG/DL (ref 0.6–1.3)
GFR SERPL CREATININE-BSD FRML MDRD: 79 ML/MIN/1.73SQ M
GLUCOSE P FAST SERPL-MCNC: 113 MG/DL (ref 65–99)
HDLC SERPL-MCNC: 47 MG/DL
LDLC SERPL CALC-MCNC: 79 MG/DL (ref 0–100)
NONHDLC SERPL-MCNC: 99 MG/DL
POTASSIUM SERPL-SCNC: 4.1 MMOL/L (ref 3.5–5.3)
PROT SERPL-MCNC: 7.1 G/DL (ref 6.4–8.2)
SODIUM SERPL-SCNC: 139 MMOL/L (ref 136–145)
TRIGL SERPL-MCNC: 99 MG/DL

## 2020-01-27 PROCEDURE — 80053 COMPREHEN METABOLIC PANEL: CPT

## 2020-01-27 PROCEDURE — 36415 COLL VENOUS BLD VENIPUNCTURE: CPT

## 2020-01-27 PROCEDURE — 80061 LIPID PANEL: CPT

## 2020-01-30 ENCOUNTER — OFFICE VISIT (OUTPATIENT)
Dept: INTERNAL MEDICINE CLINIC | Facility: CLINIC | Age: 60
End: 2020-01-30
Payer: COMMERCIAL

## 2020-01-30 VITALS
DIASTOLIC BLOOD PRESSURE: 74 MMHG | OXYGEN SATURATION: 98 % | BODY MASS INDEX: 25.69 KG/M2 | TEMPERATURE: 96.4 F | HEART RATE: 96 BPM | HEIGHT: 75 IN | WEIGHT: 206.6 LBS | RESPIRATION RATE: 14 BRPM | SYSTOLIC BLOOD PRESSURE: 120 MMHG

## 2020-01-30 DIAGNOSIS — E78.5 HYPERLIPIDEMIA, UNSPECIFIED HYPERLIPIDEMIA TYPE: ICD-10-CM

## 2020-01-30 DIAGNOSIS — Z23 NEED FOR VACCINATION: ICD-10-CM

## 2020-01-30 DIAGNOSIS — Z13.0 SCREENING FOR DEFICIENCY ANEMIA: ICD-10-CM

## 2020-01-30 DIAGNOSIS — R73.09 ABNORMAL GLUCOSE: ICD-10-CM

## 2020-01-30 DIAGNOSIS — Z12.5 SCREENING FOR PROSTATE CANCER: ICD-10-CM

## 2020-01-30 DIAGNOSIS — E66.3 OVERWEIGHT (BMI 25.0-29.9): ICD-10-CM

## 2020-01-30 DIAGNOSIS — I10 ESSENTIAL HYPERTENSION: Primary | ICD-10-CM

## 2020-01-30 PROCEDURE — 3074F SYST BP LT 130 MM HG: CPT | Performed by: INTERNAL MEDICINE

## 2020-01-30 PROCEDURE — 3008F BODY MASS INDEX DOCD: CPT | Performed by: INTERNAL MEDICINE

## 2020-01-30 PROCEDURE — 90682 RIV4 VACC RECOMBINANT DNA IM: CPT

## 2020-01-30 PROCEDURE — 99214 OFFICE O/P EST MOD 30 MIN: CPT | Performed by: INTERNAL MEDICINE

## 2020-01-30 PROCEDURE — 3078F DIAST BP <80 MM HG: CPT | Performed by: INTERNAL MEDICINE

## 2020-01-30 PROCEDURE — 90471 IMMUNIZATION ADMIN: CPT

## 2020-01-30 RX ORDER — LISINOPRIL 10 MG/1
10 TABLET ORAL DAILY
Qty: 90 TABLET | Refills: 3 | Status: SHIPPED | OUTPATIENT
Start: 2020-01-30 | End: 2020-10-05 | Stop reason: SDUPTHER

## 2020-01-30 NOTE — ASSESSMENT & PLAN NOTE
Lipid profile reviewed with the patient does show significant improvement in his cholesterol values  He is encouraged to continue with the supplements of Krill oil and red rice yeast   Healthy diet also encouraged in the patient understands the components of a low-cholesterol diet  A follow-up assessment of his cholesterol we made in 6 months

## 2020-01-30 NOTE — PROGRESS NOTES
Assessment/Plan:    Essential hypertension  Assessment of the patient's hypertension today indicates good control of his blood pressure  I recommend continuation of his lisinopril at 10 mg daily  He has no apparent side effects of the medication  He denies any symptoms to indicate hypertension or hypotension at this time  I recommended a comprehensive metabolic profile at the time of his next visit which is a complete annual physical in 6 months    Hyperlipidemia  Lipid profile reviewed with the patient does show significant improvement in his cholesterol values  He is encouraged to continue with the supplements of Krill oil and red rice yeast   Healthy diet also encouraged in the patient understands the components of a low-cholesterol diet  A follow-up assessment of his cholesterol we made in 6 months  Abnormal glucose  Abnormal glucose with a mild increase in his blood sugar compared to his last visit  Present reading is now 113  We reviewed the benefits of weight loss and I have recommended that he attempt to reduce his body weight by approximately 10 lb over the next 6 months  Also the patient understands the importance of avoiding high concentrated sugars and excessive carbohydrates in his diet  A fasting blood sugar and hemoglobin A1c is been requested for his next visit with us  Overweight (BMI 25 0-29  9)  Body mass index is 25 89  Recommend the patient work on reducing his weight which should have a benefit for both his cholesterol triglycerides blood sugar and hypertension  Reduction of calories and continuation of regular exercise is recommended  Diagnoses and all orders for this visit:    Essential hypertension  -     Comprehensive metabolic panel; Future  -     UA w Reflex to Microscopic w Reflex to Culture -Lab Collect  -     lisinopril (ZESTRIL) 10 mg tablet;  Take 1 tablet (10 mg total) by mouth daily    Hyperlipidemia, unspecified hyperlipidemia type  -     Lipid panel; Future    Screening for prostate cancer  -     PSA, Total Screen; Future    Abnormal glucose  -     Comprehensive metabolic panel; Future  -     Hemoglobin A1C; Future    Screening for deficiency anemia  -     CBC and differential; Future    Need for vaccination  -     influenza vaccine, [id number], quadrivalent, recombinant, PF, 0 5 mL, for patients 18 yr+ (FLUBLOK)        Subjective:      Patient ID: Justa Gates is a 60 y o  male  This 29-year-old gentleman presents today for routine 6 month follow-up visit  Has a history of abnormal glucose, hypertension, hyperlipidemia, hypertriglyceridemia  During today's visit we have assessed his blood pressure as well as reviewed his most recent blood work pertaining to his cholesterol and triglycerides  He has no medical complaints on this visit  We did discuss the benefits of a Shingrix vaccine and he is schedule appointment to come back for that vaccine in 2 weeks  Today he will receive his flu vaccine for this when her season  The following portions of the patient's history were reviewed and updated as appropriate:   He  has no past medical history on file  He   Patient Active Problem List    Diagnosis Date Noted    Overweight (BMI 25 0-29 9) 01/30/2020    Hyperlipidemia 07/20/2018    Erectile dysfunction 07/20/2018    Abnormal glucose 07/20/2018    Essential hypertension 07/20/2018    Peyronie disease 02/19/2018     He  has a past surgical history that includes Gallbladder surgery and Tonsillectomy (1963)  His family history includes Diabetes in his father; Heart disease in his father; Hypertension in his brother, father, mother, and sister; Kidney cancer in his brother; Osteoporosis in his mother and sister; Other in his brother  He  reports that he has never smoked  He has never used smokeless tobacco  He reports that he drinks about 2 0 standard drinks of alcohol per week  His drug history is not on file    Current Outpatient Medications Medication Sig Dispense Refill    Black Pepper-Turmeric (TURMERIC COMPLEX/BLACK PEPPER) 3-500 MG CAPS       Capsicum-Garlic (CAYENNE PLUS GARLIC PO)       Krill Oil 1000 MG CAPS Take by mouth      lisinopril (ZESTRIL) 10 mg tablet Take 1 tablet (10 mg total) by mouth daily 90 tablet 3    Misc Natural Products (GLUCOS-CHONDROIT-MSM COMPLEX) TABS       Multiple Vitamins-Minerals (MENS MULTIPLUS PO) Take by mouth      niacin 250 MG tablet       QUERCETIN PO       Red Yeast Rice 600 MG TABS       tadalafil (CIALIS) 20 MG tablet Take 0 5 tablets (10 mg total) by mouth daily as needed for erectile dysfunction 6 tablet 6     No current facility-administered medications for this visit       Review of Systems   All other systems reviewed and are negative  Objective:      /74   Pulse 96   Temp (!) 96 4 °F (35 8 °C)   Resp 14   Ht 6' 3" (1 905 m)   Wt 93 7 kg (206 lb 9 6 oz)   SpO2 98%   BMI 25 82 kg/m²          Physical Exam   Constitutional: He is oriented to person, place, and time  He appears well-developed and well-nourished  HENT:   Right Ear: Hearing, tympanic membrane, external ear and ear canal normal    Left Ear: Hearing, tympanic membrane, external ear and ear canal normal    Nose: Nose normal    Mouth/Throat: Oropharynx is clear and moist and mucous membranes are normal    Eyes: Pupils are equal, round, and reactive to light  Conjunctivae are normal    Neck: No thyromegaly present  Cardiovascular: Normal rate, regular rhythm, S1 normal, S2 normal, normal heart sounds and intact distal pulses  No murmur heard  Pulmonary/Chest: Effort normal and breath sounds normal    Musculoskeletal: Normal range of motion  He exhibits no edema  Lymphadenopathy:     He has no cervical adenopathy  Neurological: He is alert and oriented to person, place, and time  He has normal reflexes  Skin: Skin is warm and dry  Psychiatric: He has a normal mood and affect   His behavior is normal  Judgment and thought content normal

## 2020-01-30 NOTE — ASSESSMENT & PLAN NOTE
Abnormal glucose with a mild increase in his blood sugar compared to his last visit  Present reading is now 113  We reviewed the benefits of weight loss and I have recommended that he attempt to reduce his body weight by approximately 10 lb over the next 6 months  Also the patient understands the importance of avoiding high concentrated sugars and excessive carbohydrates in his diet  A fasting blood sugar and hemoglobin A1c is been requested for his next visit with us

## 2020-01-30 NOTE — ASSESSMENT & PLAN NOTE
Assessment of the patient's hypertension today indicates good control of his blood pressure  I recommend continuation of his lisinopril at 10 mg daily  He has no apparent side effects of the medication  He denies any symptoms to indicate hypertension or hypotension at this time    I recommended a comprehensive metabolic profile at the time of his next visit which is a complete annual physical in 6 months

## 2020-01-30 NOTE — ASSESSMENT & PLAN NOTE
Body mass index is 25 89  Recommend the patient work on reducing his weight which should have a benefit for both his cholesterol triglycerides blood sugar and hypertension  Reduction of calories and continuation of regular exercise is recommended

## 2020-02-13 ENCOUNTER — CLINICAL SUPPORT (OUTPATIENT)
Dept: INTERNAL MEDICINE CLINIC | Facility: CLINIC | Age: 60
End: 2020-02-13
Payer: COMMERCIAL

## 2020-02-13 DIAGNOSIS — Z23 NEED FOR VACCINATION: Primary | ICD-10-CM

## 2020-02-13 PROCEDURE — 90750 HZV VACC RECOMBINANT IM: CPT

## 2020-02-13 PROCEDURE — 90471 IMMUNIZATION ADMIN: CPT

## 2020-03-11 ENCOUNTER — ANESTHESIA EVENT (OUTPATIENT)
Dept: GASTROENTEROLOGY | Facility: AMBULARY SURGERY CENTER | Age: 60
End: 2020-03-11

## 2020-03-13 ENCOUNTER — ANESTHESIA (OUTPATIENT)
Dept: GASTROENTEROLOGY | Facility: AMBULARY SURGERY CENTER | Age: 60
End: 2020-03-13

## 2020-03-13 ENCOUNTER — HOSPITAL ENCOUNTER (OUTPATIENT)
Dept: GASTROENTEROLOGY | Facility: AMBULARY SURGERY CENTER | Age: 60
Setting detail: OUTPATIENT SURGERY
Discharge: HOME | End: 2020-03-13
Attending: COLON & RECTAL SURGERY | Admitting: COLON & RECTAL SURGERY
Payer: COMMERCIAL

## 2020-03-13 VITALS
HEIGHT: 75 IN | RESPIRATION RATE: 18 BRPM | DIASTOLIC BLOOD PRESSURE: 77 MMHG | SYSTOLIC BLOOD PRESSURE: 119 MMHG | OXYGEN SATURATION: 96 % | BODY MASS INDEX: 24.94 KG/M2 | WEIGHT: 200.6 LBS | HEART RATE: 72 BPM | TEMPERATURE: 97.1 F

## 2020-03-13 DIAGNOSIS — Z12.11 COLON CANCER SCREENING: ICD-10-CM

## 2020-03-13 PROCEDURE — G0105 COLORECTAL SCRN; HI RISK IND: HCPCS | Performed by: COLON & RECTAL SURGERY

## 2020-03-13 PROCEDURE — 99213 OFFICE O/P EST LOW 20 MIN: CPT | Performed by: COLON & RECTAL SURGERY

## 2020-03-13 RX ORDER — LIDOCAINE HYDROCHLORIDE 10 MG/ML
INJECTION, SOLUTION EPIDURAL; INFILTRATION; INTRACAUDAL; PERINEURAL AS NEEDED
Status: DISCONTINUED | OUTPATIENT
Start: 2020-03-13 | End: 2020-03-13 | Stop reason: SURG

## 2020-03-13 RX ORDER — SODIUM CHLORIDE, SODIUM LACTATE, POTASSIUM CHLORIDE, CALCIUM CHLORIDE 600; 310; 30; 20 MG/100ML; MG/100ML; MG/100ML; MG/100ML
INJECTION, SOLUTION INTRAVENOUS CONTINUOUS PRN
Status: DISCONTINUED | OUTPATIENT
Start: 2020-03-13 | End: 2020-03-13 | Stop reason: SURG

## 2020-03-13 RX ORDER — OMEGA-3S/DHA/EPA/FISH OIL/D3 300MG-1000
2000 CAPSULE ORAL DAILY
COMMUNITY

## 2020-03-13 RX ORDER — PROPOFOL 10 MG/ML
INJECTION, EMULSION INTRAVENOUS AS NEEDED
Status: DISCONTINUED | OUTPATIENT
Start: 2020-03-13 | End: 2020-03-13 | Stop reason: SURG

## 2020-03-13 RX ADMIN — PROPOFOL 30 MG: 10 INJECTION, EMULSION INTRAVENOUS at 11:00

## 2020-03-13 RX ADMIN — SODIUM CHLORIDE, SODIUM LACTATE, POTASSIUM CHLORIDE, AND CALCIUM CHLORIDE: .6; .31; .03; .02 INJECTION, SOLUTION INTRAVENOUS at 10:42

## 2020-03-13 RX ADMIN — PROPOFOL 30 MG: 10 INJECTION, EMULSION INTRAVENOUS at 10:57

## 2020-03-13 RX ADMIN — LIDOCAINE HYDROCHLORIDE 50 MG: 10 INJECTION, SOLUTION EPIDURAL; INFILTRATION; INTRACAUDAL; PERINEURAL at 10:49

## 2020-03-13 RX ADMIN — PROPOFOL 60 MG: 10 INJECTION, EMULSION INTRAVENOUS at 10:49

## 2020-03-13 RX ADMIN — PROPOFOL 30 MG: 10 INJECTION, EMULSION INTRAVENOUS at 10:54

## 2020-03-13 RX ADMIN — PROPOFOL 10 MG: 10 INJECTION, EMULSION INTRAVENOUS at 11:06

## 2020-03-13 RX ADMIN — PROPOFOL 40 MG: 10 INJECTION, EMULSION INTRAVENOUS at 10:51

## 2020-03-13 NOTE — ANESTHESIA POSTPROCEDURE EVALUATION
Post-Op Assessment Note    CV Status:  Stable  Pain Score: 0    Pain management: adequate     Mental Status:  Alert and awake   Hydration Status:  Euvolemic   PONV Controlled:  Controlled   Airway Patency:  Patent   Post Op Vitals Reviewed: Yes      Staff: CRNA           /72 (03/13/20 1112)    Temp (!) 97 1 °F (36 2 °C) (03/13/20 1112)    Pulse 83 (03/13/20 1112)   Resp 18 (03/13/20 1112)    SpO2 96 % (03/13/20 1112)

## 2020-03-13 NOTE — H&P
History and Physical   Colon and Rectal Surgery   Tayler Chamorro 60 y o  male MRN: [id number]  Unit/Bed#:  Encounter: [id number]  03/13/20   10:35 AM      CC:  Screening of the colon  History of Present Illness   HPI:  Tayler Chamorro is a 60 y o  male with no GI symptoms     Historical Information   Past Medical History:   Diagnosis Date    Hypertension      Past Surgical History:   Procedure Laterality Date    CHOLECYSTECTOMY      GALLBLADDER SURGERY      TONSILLECTOMY  1963       Meds/Allergies       (Not in a hospital admission)      Current Outpatient Medications:     Black Pepper-Turmeric (Turmeric Complex/Black Pepper) 3-500 MG CAPS, , Disp: , Rfl:     Capsicum-Garlic (CAYENNE PLUS GARLIC PO), , Disp: , Rfl:     cholecalciferol (VITAMIN D3) 400 units tablet, Take 2,000 Units by mouth daily, Disp: , Rfl:     Krill Oil 1000 MG CAPS, Take by mouth, Disp: , Rfl:     lisinopril (ZESTRIL) 10 mg tablet, Take 1 tablet (10 mg total) by mouth daily, Disp: 90 tablet, Rfl: 3    Misc Natural Products (GLUCOS-CHONDROIT-MSM COMPLEX) TABS, , Disp: , Rfl:     Multiple Vitamins-Minerals (MENS MULTIPLUS PO), Take by mouth, Disp: , Rfl:     niacin 250 MG tablet, , Disp: , Rfl:     QUERCETIN PO, , Disp: , Rfl:     Red Yeast Rice 600 MG TABS, , Disp: , Rfl:     tadalafil (CIALIS) 20 MG tablet, Take 0 5 tablets (10 mg total) by mouth daily as needed for erectile dysfunction, Disp: 6 tablet, Rfl: 6    Allergies   Allergen Reactions    Penicillins Hives         Social History   Social History     Substance and Sexual Activity   Alcohol Use Yes    Alcohol/week: 2 0 standard drinks    Types: 2 Glasses of wine per week    Comment: daily     Social History     Substance and Sexual Activity   Drug Use Not on file     Social History     Tobacco Use   Smoking Status Never Smoker   Smokeless Tobacco Never Used         Family History:   Family History   Problem Relation Age of Onset    Hypertension Mother     Osteoporosis Mother     Diabetes Father     Heart disease Father     Hypertension Father     Hypertension Sister     Osteoporosis Sister     Kidney cancer Brother     Hypertension Brother     Other Brother         kidney stones, calcium oxalate         Objective     Current Vitals:   Blood Pressure: 128/75 (03/13/20 1001)  Pulse: 92 (03/13/20 1001)  Temperature: 98 1 °F (36 7 °C) (03/13/20 1001)  Temp Source: Temporal (03/13/20 1001)  Respirations: 18 (03/13/20 1001)  Height: 6' 3" (190 5 cm) (03/13/20 1001)  Weight - Scale: 91 kg (200 lb 9 6 oz) (03/13/20 1001)  SpO2: 98 % (03/13/20 1001)  No intake or output data in the 24 hours ending 03/13/20 1035    Physical Exam:  General:  Well nourished, no distress  Neuro: Alert and oriented  Eyes:Sclera anicteric, conjunctiva pink  Pulm: Clear to auscultation bilaterally  No respiratory Distress  CV:  Regular rate and rhythm  No murmurs  Abdomen:  Soft, flat, non-tender, without masses or hepatosplenomegaly  Lab Results:       ASSESSMENT:  Cipriano Vargas is a 60 y o  male for screening  PLAN:  Colonoscopy  Risks , including, but not limited to, bleeding, perforation, missed lesions, and potential need for surgery, were reviewed  Alternatives to colonoscopy were discussed    Sonia Andrade MD

## 2020-03-13 NOTE — ANESTHESIA PREPROCEDURE EVALUATION
Review of Systems/Medical History  Patient summary reviewed  Chart reviewed  No history of anesthetic complications     Cardiovascular  Exercise tolerance (METS): >4,  Hyperlipidemia, Hypertension ,    Pulmonary  Negative pulmonary ROS Not a smoker ,        GI/Hepatic  Negative GI/hepatic ROS          Negative  ROS        Endo/Other  Negative endo/other ROS      GYN  Negative gynecology ROS          Hematology  Negative hematology ROS      Musculoskeletal  Negative musculoskeletal ROS        Neurology  Negative neurology ROS      Psychology   Negative psychology ROS              Physical Exam    Airway    Mallampati score: II  TM Distance: >3 FB  Neck ROM: full     Dental   No notable dental hx     Cardiovascular  Rhythm: regular, Rate: normal, Cardiovascular exam normal    Pulmonary  Pulmonary exam normal Breath sounds clear to auscultation,     Other Findings        Anesthesia Plan  ASA Score- 2     Anesthesia Type- general with ASA Monitors  Additional Monitors:   Airway Plan:         Plan Factors-  Patient did not smoke on day of surgery  Induction- intravenous  Postoperative Plan-     Informed Consent- Anesthetic plan and risks discussed with patient  I personally reviewed this patient with the CRNA  Discussed and agreed on the Anesthesia Plan with the CRNA  Barak Crooks

## 2020-05-29 ENCOUNTER — TELEMEDICINE (OUTPATIENT)
Dept: INTERNAL MEDICINE CLINIC | Facility: CLINIC | Age: 60
End: 2020-05-29
Payer: COMMERCIAL

## 2020-05-29 VITALS
DIASTOLIC BLOOD PRESSURE: 78 MMHG | BODY MASS INDEX: 24.87 KG/M2 | SYSTOLIC BLOOD PRESSURE: 123 MMHG | WEIGHT: 200 LBS | HEIGHT: 75 IN | HEART RATE: 80 BPM | TEMPERATURE: 97 F

## 2020-05-29 DIAGNOSIS — R42 POSTURAL DIZZINESS: Primary | ICD-10-CM

## 2020-05-29 PROCEDURE — 3078F DIAST BP <80 MM HG: CPT | Performed by: NURSE PRACTITIONER

## 2020-05-29 PROCEDURE — 3074F SYST BP LT 130 MM HG: CPT | Performed by: NURSE PRACTITIONER

## 2020-05-29 PROCEDURE — 99213 OFFICE O/P EST LOW 20 MIN: CPT | Performed by: NURSE PRACTITIONER

## 2020-05-29 RX ORDER — FLUTICASONE PROPIONATE 50 MCG
1 SPRAY, SUSPENSION (ML) NASAL DAILY
COMMUNITY
End: 2022-08-02

## 2020-05-29 RX ORDER — PSEUDOEPHEDRINE HYDROCHLORIDE 30 MG/1
60 TABLET ORAL EVERY 4 HOURS PRN
COMMUNITY
End: 2021-02-05

## 2020-05-29 RX ORDER — MECLIZINE HCL 12.5 MG/1
12.5 TABLET ORAL EVERY 8 HOURS PRN
Qty: 20 TABLET | Refills: 0 | Status: SHIPPED | OUTPATIENT
Start: 2020-05-29 | End: 2020-07-28 | Stop reason: ALTCHOICE

## 2020-07-21 ENCOUNTER — APPOINTMENT (OUTPATIENT)
Dept: LAB | Facility: CLINIC | Age: 60
End: 2020-07-21
Payer: COMMERCIAL

## 2020-07-21 DIAGNOSIS — E78.5 HYPERLIPIDEMIA, UNSPECIFIED HYPERLIPIDEMIA TYPE: ICD-10-CM

## 2020-07-21 DIAGNOSIS — Z13.0 SCREENING FOR DEFICIENCY ANEMIA: ICD-10-CM

## 2020-07-21 DIAGNOSIS — Z12.5 SCREENING FOR PROSTATE CANCER: ICD-10-CM

## 2020-07-21 DIAGNOSIS — R73.09 ABNORMAL GLUCOSE: ICD-10-CM

## 2020-07-21 LAB
BASOPHILS # BLD AUTO: 0.06 THOUSANDS/ΜL (ref 0–0.1)
BASOPHILS NFR BLD AUTO: 1 % (ref 0–1)
BILIRUB UR QL STRIP: NEGATIVE
CHOLEST SERPL-MCNC: 156 MG/DL (ref 50–200)
CLARITY UR: CLEAR
COLOR UR: YELLOW
EOSINOPHIL # BLD AUTO: 0.16 THOUSAND/ΜL (ref 0–0.61)
EOSINOPHIL NFR BLD AUTO: 3 % (ref 0–6)
ERYTHROCYTE [DISTWIDTH] IN BLOOD BY AUTOMATED COUNT: 12.6 % (ref 11.6–15.1)
EST. AVERAGE GLUCOSE BLD GHB EST-MCNC: 123 MG/DL
GLUCOSE UR STRIP-MCNC: NEGATIVE MG/DL
HBA1C MFR BLD: 5.9 %
HCT VFR BLD AUTO: 42.9 % (ref 36.5–49.3)
HDLC SERPL-MCNC: 48 MG/DL
HGB BLD-MCNC: 14.1 G/DL (ref 12–17)
HGB UR QL STRIP.AUTO: NEGATIVE
IMM GRANULOCYTES # BLD AUTO: 0.01 THOUSAND/UL (ref 0–0.2)
IMM GRANULOCYTES NFR BLD AUTO: 0 % (ref 0–2)
KETONES UR STRIP-MCNC: NEGATIVE MG/DL
LDLC SERPL CALC-MCNC: 89 MG/DL (ref 0–100)
LEUKOCYTE ESTERASE UR QL STRIP: NEGATIVE
LYMPHOCYTES # BLD AUTO: 2.18 THOUSANDS/ΜL (ref 0.6–4.47)
LYMPHOCYTES NFR BLD AUTO: 46 % (ref 14–44)
MCH RBC QN AUTO: 30.9 PG (ref 26.8–34.3)
MCHC RBC AUTO-ENTMCNC: 32.9 G/DL (ref 31.4–37.4)
MCV RBC AUTO: 94 FL (ref 82–98)
MONOCYTES # BLD AUTO: 0.5 THOUSAND/ΜL (ref 0.17–1.22)
MONOCYTES NFR BLD AUTO: 11 % (ref 4–12)
NEUTROPHILS # BLD AUTO: 1.85 THOUSANDS/ΜL (ref 1.85–7.62)
NEUTS SEG NFR BLD AUTO: 39 % (ref 43–75)
NITRITE UR QL STRIP: NEGATIVE
NONHDLC SERPL-MCNC: 108 MG/DL
NRBC BLD AUTO-RTO: 0 /100 WBCS
PH UR STRIP.AUTO: 6 [PH]
PLATELET # BLD AUTO: 221 THOUSANDS/UL (ref 149–390)
PMV BLD AUTO: 11.9 FL (ref 8.9–12.7)
PROT UR STRIP-MCNC: NEGATIVE MG/DL
PSA SERPL-MCNC: 0.9 NG/ML (ref 0–4)
RBC # BLD AUTO: 4.57 MILLION/UL (ref 3.88–5.62)
SP GR UR STRIP.AUTO: 1.01 (ref 1–1.03)
TRIGL SERPL-MCNC: 94 MG/DL
UROBILINOGEN UR QL STRIP.AUTO: 0.2 E.U./DL
WBC # BLD AUTO: 4.76 THOUSAND/UL (ref 4.31–10.16)

## 2020-07-21 PROCEDURE — 80061 LIPID PANEL: CPT

## 2020-07-21 PROCEDURE — 85025 COMPLETE CBC W/AUTO DIFF WBC: CPT

## 2020-07-21 PROCEDURE — 83036 HEMOGLOBIN GLYCOSYLATED A1C: CPT

## 2020-07-21 PROCEDURE — G0103 PSA SCREENING: HCPCS

## 2020-07-21 PROCEDURE — 36415 COLL VENOUS BLD VENIPUNCTURE: CPT

## 2020-07-21 PROCEDURE — 81003 URINALYSIS AUTO W/O SCOPE: CPT | Performed by: INTERNAL MEDICINE

## 2020-07-28 ENCOUNTER — OFFICE VISIT (OUTPATIENT)
Dept: INTERNAL MEDICINE CLINIC | Facility: CLINIC | Age: 60
End: 2020-07-28
Payer: COMMERCIAL

## 2020-07-28 VITALS
DIASTOLIC BLOOD PRESSURE: 78 MMHG | OXYGEN SATURATION: 99 % | BODY MASS INDEX: 25.02 KG/M2 | TEMPERATURE: 98.9 F | SYSTOLIC BLOOD PRESSURE: 124 MMHG | HEIGHT: 75 IN | RESPIRATION RATE: 16 BRPM | HEART RATE: 90 BPM | WEIGHT: 201.2 LBS

## 2020-07-28 DIAGNOSIS — R73.09 ABNORMAL GLUCOSE: ICD-10-CM

## 2020-07-28 DIAGNOSIS — Z00.00 HEALTHCARE MAINTENANCE: Primary | ICD-10-CM

## 2020-07-28 DIAGNOSIS — R07.9 CHEST PAIN, UNSPECIFIED TYPE: ICD-10-CM

## 2020-07-28 DIAGNOSIS — N52.9 ERECTILE DYSFUNCTION, UNSPECIFIED ERECTILE DYSFUNCTION TYPE: ICD-10-CM

## 2020-07-28 DIAGNOSIS — I10 ESSENTIAL HYPERTENSION: ICD-10-CM

## 2020-07-28 DIAGNOSIS — Z23 ENCOUNTER FOR IMMUNIZATION: ICD-10-CM

## 2020-07-28 PROBLEM — R42 POSTURAL DIZZINESS: Status: RESOLVED | Noted: 2020-05-29 | Resolved: 2020-07-28

## 2020-07-28 PROCEDURE — 90471 IMMUNIZATION ADMIN: CPT

## 2020-07-28 PROCEDURE — 90750 HZV VACC RECOMBINANT IM: CPT

## 2020-07-28 PROCEDURE — 99396 PREV VISIT EST AGE 40-64: CPT | Performed by: INTERNAL MEDICINE

## 2020-07-28 PROCEDURE — 3008F BODY MASS INDEX DOCD: CPT | Performed by: INTERNAL MEDICINE

## 2020-07-28 RX ORDER — TADALAFIL 20 MG/1
10 TABLET ORAL DAILY PRN
Qty: 6 TABLET | Refills: 6 | Status: SHIPPED | OUTPATIENT
Start: 2020-07-28 | End: 2021-08-02

## 2020-07-28 NOTE — PROGRESS NOTES
Assessment/Plan:    Essential hypertension  Assessment of the patient's hypertension today indicates good control reading of 124/78  Recommend that he continue on his current dose of lisinopril 10 mg daily  Hyperlipidemia  Lipid profile reviewed with the patient during today's visit shows good basic control of her cholesterol values recommend continuation of low-cholesterol diet regular exercise and follow-up testing in 1 year    Erectile dysfunction  Erectile dysfunction discussed with the patient during today's visit recommend Cialis 20 mg 1/2 tablet as needed for erectile dysfunction symptoms proper use of medication reviewed    Abnormal glucose  Hemoglobin A1c returned today mildly elevated with a reading of 5 9%  I have requested a comprehensive metabolic profile to evaluate his fasting blood sugar  I have recommended the patient decrease his consumption of concentrated sugars as well as carbohydrates  Pending the outcome of his comprehensive metabolic profile will plan out follow-up testing in the future most likely within 6 months  Regular exercise also recommended to help control the patient's fasting sugars    Chest pain  Episode of atypical chest pain several months ago that lasted for 3 days  This was not associated with exertion or relieved with rest   Pain was of retrosternal and epigastric in location  Patient does have risk factors of hypertension as well as history of L elevated lipids and a family history of heart disease  In view of these factors I have recommended a stress echocardiogram for further evaluation  Diagnoses and all orders for this visit:    Abnormal glucose  -     Comprehensive metabolic panel; Future    Encounter for immunization  -     Zoster Vaccine Recombinant IM    Erectile dysfunction, unspecified erectile dysfunction type  -     tadalafil (CIALIS) 20 MG tablet;  Take 0 5 tablets (10 mg total) by mouth daily as needed for erectile dysfunction    Chest pain, unspecified type  -     Echo stress test w contrast if indicated; Future    Essential hypertension    Other orders  -     Apple Cider Vinegar 600 MG CAPS  -     Chromium-Cinnamon (CINNAMON PLUS CHROMIUM PO)        Subjective:      Patient ID: Reg Valdez is a 63 y o  male  This is an annual complete physical examination and review of blood work for this 74-year-old gentleman  He presents today in good general health with no specific complaints  He has had no signs or symptoms of COVID-19  He understands the essentials of social distancing face masking when he leaves home and good hand washing techniques  He did have an event several months ago where he had some chest discomfort which was alarming to him  This was something that lasted for approximately 3 days  It did not seem to be associated with physical exertion or relieved with rest   Of he has not had any repeat symptoms like this since that episode  He is concerned that his family history for heart disease is very strong and his father in fact passed away when he was 63years of age  His risk factors include hypertension, hyperlipidemia and family history of coronary artery disease      The following portions of the patient's history were reviewed and updated as appropriate:   He  has a past medical history of Hypertension  He   Patient Active Problem List    Diagnosis Date Noted    Chest pain 07/28/2020    Overweight (BMI 25 0-29 9) 01/30/2020    Hyperlipidemia 07/20/2018    Erectile dysfunction 07/20/2018    Abnormal glucose 07/20/2018    Essential hypertension 07/20/2018    Peyronie disease 02/19/2018     He  has a past surgical history that includes Gallbladder surgery; Tonsillectomy (1963); and Cholecystectomy  His family history includes Diabetes in his father; Heart disease in his father; Hypertension in his brother, father, mother, and sister; Kidney cancer in his brother; Osteoporosis in his mother and sister;  Other in his brother  He  reports that he has never smoked  He has never used smokeless tobacco  He reports that he drinks about 2 0 standard drinks of alcohol per week  His drug history is not on file  Current Outpatient Medications   Medication Sig Dispense Refill    Apple Cider Vinegar 600 MG CAPS       Black Pepper-Turmeric (Turmeric Complex/Black Pepper) 3-500 MG CAPS       Capsicum-Garlic (CAYENNE PLUS GARLIC PO)       cholecalciferol (VITAMIN D3) 400 units tablet Take 2,000 Units by mouth daily      Chromium-Cinnamon (CINNAMON PLUS CHROMIUM PO)       fluticasone (FLONASE) 50 mcg/act nasal spray 1 spray into each nostril daily      Krill Oil 1000 MG CAPS Take by mouth      lisinopril (ZESTRIL) 10 mg tablet Take 1 tablet (10 mg total) by mouth daily 90 tablet 3    Misc Natural Products (GLUCOS-CHONDROIT-MSM COMPLEX) TABS       Multiple Vitamins-Minerals (MENS MULTIPLUS PO) Take by mouth      niacin 250 MG tablet       QUERCETIN PO       Red Yeast Rice 600 MG TABS       tadalafil (CIALIS) 20 MG tablet Take 0 5 tablets (10 mg total) by mouth daily as needed for erectile dysfunction 6 tablet 6    pseudoephedrine (SUDAFED) 30 mg tablet Take 60 mg by mouth every 4 (four) hours as needed for congestion       No current facility-administered medications for this visit       Review of Systems   Cardiovascular: Positive for chest pain  All other systems reviewed and are negative  Objective:      /78   Pulse 90   Temp 98 9 °F (37 2 °C)   Resp 16   Ht 6' 3" (1 905 m)   Wt 91 3 kg (201 lb 3 2 oz)   SpO2 99%   BMI 25 15 kg/m²          Physical Exam   Constitutional: He is oriented to person, place, and time  He appears well-developed and well-nourished  No distress     HENT:   Right Ear: Hearing, tympanic membrane, external ear and ear canal normal    Left Ear: Hearing, tympanic membrane, external ear and ear canal normal    Nose: Nose normal    Mouth/Throat: Oropharynx is clear and moist and mucous membranes are normal    Eyes: Pupils are equal, round, and reactive to light  Conjunctivae are normal  Right eye exhibits no discharge  Left eye exhibits no discharge  Neck: No JVD present  No tracheal deviation present  No thyromegaly present  Cardiovascular: Normal rate, regular rhythm, S1 normal, S2 normal, normal heart sounds and intact distal pulses  No murmur heard  Pulmonary/Chest: Effort normal and breath sounds normal  No stridor  No respiratory distress  He has no wheezes  He has no rales  Abdominal: Soft  Bowel sounds are normal  He exhibits no distension and no mass  There is no tenderness  There is no guarding  Hernia confirmed negative in the right inguinal area and confirmed negative in the left inguinal area  Genitourinary: Rectum normal, prostate normal, testes normal and penis normal    Musculoskeletal: Normal range of motion  He exhibits no edema, tenderness or deformity  Lymphadenopathy:     He has no cervical adenopathy  No inguinal adenopathy noted on the right or left side  Neurological: He is alert and oriented to person, place, and time  He has normal reflexes  He displays normal reflexes  No cranial nerve deficit  Coordination normal    Skin: Skin is warm and dry  No rash noted  He is not diaphoretic  No erythema  No pallor  Psychiatric: He has a normal mood and affect  His behavior is normal  Judgment and thought content normal      BMI Counseling: Body mass index is 25 15 kg/m²  The BMI is above normal  Nutrition recommendations include reducing portion sizes, reducing fast food intake, consuming healthier snacks, decreasing soda and/or juice intake and moderation in carbohydrate intake  Exercise recommendations include exercising 3-5 times per week

## 2020-07-28 NOTE — ASSESSMENT & PLAN NOTE
Erectile dysfunction discussed with the patient during today's visit recommend Cialis 20 mg 1/2 tablet as needed for erectile dysfunction symptoms proper use of medication reviewed

## 2020-07-28 NOTE — ASSESSMENT & PLAN NOTE
Episode of atypical chest pain several months ago that lasted for 3 days  This was not associated with exertion or relieved with rest   Pain was of retrosternal and epigastric in location  Patient does have risk factors of hypertension as well as history of L elevated lipids and a family history of heart disease  In view of these factors I have recommended a stress echocardiogram for further evaluation

## 2020-07-28 NOTE — ASSESSMENT & PLAN NOTE
Lipid profile reviewed with the patient during today's visit shows good basic control of her cholesterol values recommend continuation of low-cholesterol diet regular exercise and follow-up testing in 1 year

## 2020-07-28 NOTE — ASSESSMENT & PLAN NOTE
Assessment of the patient's hypertension today indicates good control reading of 124/78  Recommend that he continue on his current dose of lisinopril 10 mg daily

## 2020-07-28 NOTE — ASSESSMENT & PLAN NOTE
Hemoglobin A1c returned today mildly elevated with a reading of 5 9%  I have requested a comprehensive metabolic profile to evaluate his fasting blood sugar  I have recommended the patient decrease his consumption of concentrated sugars as well as carbohydrates  Pending the outcome of his comprehensive metabolic profile will plan out follow-up testing in the future most likely within 6 months    Regular exercise also recommended to help control the patient's fasting sugars

## 2020-08-07 ENCOUNTER — APPOINTMENT (OUTPATIENT)
Dept: LAB | Facility: CLINIC | Age: 60
End: 2020-08-07
Payer: COMMERCIAL

## 2020-08-07 DIAGNOSIS — R73.09 ABNORMAL GLUCOSE: ICD-10-CM

## 2020-08-07 LAB
ALBUMIN SERPL BCP-MCNC: 3.8 G/DL (ref 3.5–5)
ALP SERPL-CCNC: 84 U/L (ref 46–116)
ALT SERPL W P-5'-P-CCNC: 37 U/L (ref 12–78)
ANION GAP SERPL CALCULATED.3IONS-SCNC: 6 MMOL/L (ref 4–13)
AST SERPL W P-5'-P-CCNC: 18 U/L (ref 5–45)
BILIRUB SERPL-MCNC: 0.5 MG/DL (ref 0.2–1)
BUN SERPL-MCNC: 10 MG/DL (ref 5–25)
CALCIUM SERPL-MCNC: 9.1 MG/DL (ref 8.3–10.1)
CHLORIDE SERPL-SCNC: 104 MMOL/L (ref 100–108)
CO2 SERPL-SCNC: 31 MMOL/L (ref 21–32)
CREAT SERPL-MCNC: 1.04 MG/DL (ref 0.6–1.3)
GFR SERPL CREATININE-BSD FRML MDRD: 77 ML/MIN/1.73SQ M
GLUCOSE P FAST SERPL-MCNC: 103 MG/DL (ref 65–99)
POTASSIUM SERPL-SCNC: 3.9 MMOL/L (ref 3.5–5.3)
PROT SERPL-MCNC: 7.4 G/DL (ref 6.4–8.2)
SODIUM SERPL-SCNC: 141 MMOL/L (ref 136–145)

## 2020-08-07 PROCEDURE — 36415 COLL VENOUS BLD VENIPUNCTURE: CPT

## 2020-08-07 PROCEDURE — 80053 COMPREHEN METABOLIC PANEL: CPT

## 2020-08-24 DIAGNOSIS — R07.9 CHEST PAIN, UNSPECIFIED TYPE: Primary | ICD-10-CM

## 2020-10-05 ENCOUNTER — CONSULT (OUTPATIENT)
Dept: CARDIOLOGY CLINIC | Facility: CLINIC | Age: 60
End: 2020-10-05
Payer: COMMERCIAL

## 2020-10-05 VITALS
SYSTOLIC BLOOD PRESSURE: 128 MMHG | BODY MASS INDEX: 25.57 KG/M2 | HEART RATE: 65 BPM | HEIGHT: 75 IN | TEMPERATURE: 97.1 F | DIASTOLIC BLOOD PRESSURE: 82 MMHG | WEIGHT: 205.7 LBS

## 2020-10-05 DIAGNOSIS — E78.2 MIXED HYPERLIPIDEMIA: Primary | ICD-10-CM

## 2020-10-05 DIAGNOSIS — R07.9 CHEST PAIN, UNSPECIFIED TYPE: ICD-10-CM

## 2020-10-05 DIAGNOSIS — I10 ESSENTIAL HYPERTENSION: ICD-10-CM

## 2020-10-05 PROCEDURE — 3074F SYST BP LT 130 MM HG: CPT | Performed by: INTERNAL MEDICINE

## 2020-10-05 PROCEDURE — 93000 ELECTROCARDIOGRAM COMPLETE: CPT | Performed by: INTERNAL MEDICINE

## 2020-10-05 PROCEDURE — 99204 OFFICE O/P NEW MOD 45 MIN: CPT | Performed by: INTERNAL MEDICINE

## 2020-10-05 PROCEDURE — 3079F DIAST BP 80-89 MM HG: CPT | Performed by: INTERNAL MEDICINE

## 2020-10-05 RX ORDER — LISINOPRIL 10 MG/1
20 TABLET ORAL DAILY
Qty: 90 TABLET | Refills: 3 | Status: SHIPPED | OUTPATIENT
Start: 2020-10-05 | End: 2021-05-05 | Stop reason: SDUPTHER

## 2020-10-05 RX ORDER — ATORVASTATIN CALCIUM 40 MG/1
40 TABLET, FILM COATED ORAL DAILY
Qty: 90 TABLET | Refills: 4 | Status: SHIPPED | OUTPATIENT
Start: 2020-10-05 | End: 2021-02-05 | Stop reason: ALTCHOICE

## 2021-01-12 ENCOUNTER — TELEPHONE (OUTPATIENT)
Dept: INTERNAL MEDICINE CLINIC | Facility: CLINIC | Age: 61
End: 2021-01-12

## 2021-01-12 DIAGNOSIS — I10 ESSENTIAL HYPERTENSION: Primary | ICD-10-CM

## 2021-01-12 DIAGNOSIS — R73.09 ABNORMAL GLUCOSE: ICD-10-CM

## 2021-01-12 DIAGNOSIS — E78.2 MIXED HYPERLIPIDEMIA: ICD-10-CM

## 2021-01-12 NOTE — TELEPHONE ENCOUNTER
Orders for blood work have been placed in the [facility] laboratory system please have the patient fast for 10-12 hours prior to going in for his blood work thank you

## 2021-01-25 ENCOUNTER — LAB (OUTPATIENT)
Dept: LAB | Facility: CLINIC | Age: 61
End: 2021-01-25
Payer: COMMERCIAL

## 2021-01-25 DIAGNOSIS — I10 ESSENTIAL HYPERTENSION: ICD-10-CM

## 2021-01-25 DIAGNOSIS — R73.09 ABNORMAL GLUCOSE: ICD-10-CM

## 2021-01-25 DIAGNOSIS — E78.2 MIXED HYPERLIPIDEMIA: ICD-10-CM

## 2021-01-25 LAB
ALBUMIN SERPL BCP-MCNC: 4 G/DL (ref 3.5–5)
ALP SERPL-CCNC: 71 U/L (ref 46–116)
ALT SERPL W P-5'-P-CCNC: 66 U/L (ref 12–78)
ANION GAP SERPL CALCULATED.3IONS-SCNC: 2 MMOL/L (ref 4–13)
AST SERPL W P-5'-P-CCNC: 39 U/L (ref 5–45)
BILIRUB SERPL-MCNC: 0.89 MG/DL (ref 0.2–1)
BUN SERPL-MCNC: 11 MG/DL (ref 5–25)
CALCIUM SERPL-MCNC: 9.2 MG/DL (ref 8.3–10.1)
CHLORIDE SERPL-SCNC: 105 MMOL/L (ref 100–108)
CHOLEST SERPL-MCNC: 105 MG/DL (ref 50–200)
CO2 SERPL-SCNC: 32 MMOL/L (ref 21–32)
CREAT SERPL-MCNC: 0.98 MG/DL (ref 0.6–1.3)
EST. AVERAGE GLUCOSE BLD GHB EST-MCNC: 120 MG/DL
GFR SERPL CREATININE-BSD FRML MDRD: 83 ML/MIN/1.73SQ M
GLUCOSE P FAST SERPL-MCNC: 110 MG/DL (ref 65–99)
HBA1C MFR BLD: 5.8 %
HDLC SERPL-MCNC: 47 MG/DL
LDLC SERPL CALC-MCNC: 42 MG/DL (ref 0–100)
NONHDLC SERPL-MCNC: 58 MG/DL
POTASSIUM SERPL-SCNC: 4 MMOL/L (ref 3.5–5.3)
PROT SERPL-MCNC: 6.9 G/DL (ref 6.4–8.2)
SODIUM SERPL-SCNC: 139 MMOL/L (ref 136–145)
TRIGL SERPL-MCNC: 82 MG/DL

## 2021-01-25 PROCEDURE — 83036 HEMOGLOBIN GLYCOSYLATED A1C: CPT

## 2021-01-25 PROCEDURE — 36415 COLL VENOUS BLD VENIPUNCTURE: CPT

## 2021-01-25 PROCEDURE — 80061 LIPID PANEL: CPT

## 2021-01-25 PROCEDURE — 80053 COMPREHEN METABOLIC PANEL: CPT

## 2021-02-05 ENCOUNTER — OFFICE VISIT (OUTPATIENT)
Dept: INTERNAL MEDICINE CLINIC | Facility: CLINIC | Age: 61
End: 2021-02-05
Payer: COMMERCIAL

## 2021-02-05 VITALS
HEIGHT: 75 IN | TEMPERATURE: 97.9 F | BODY MASS INDEX: 26.14 KG/M2 | OXYGEN SATURATION: 99 % | WEIGHT: 210.2 LBS | DIASTOLIC BLOOD PRESSURE: 80 MMHG | SYSTOLIC BLOOD PRESSURE: 126 MMHG | HEART RATE: 80 BPM

## 2021-02-05 DIAGNOSIS — E78.2 MIXED HYPERLIPIDEMIA: ICD-10-CM

## 2021-02-05 DIAGNOSIS — Z23 ENCOUNTER FOR IMMUNIZATION: ICD-10-CM

## 2021-02-05 DIAGNOSIS — R73.09 ABNORMAL GLUCOSE: ICD-10-CM

## 2021-02-05 DIAGNOSIS — I10 ESSENTIAL HYPERTENSION: Primary | ICD-10-CM

## 2021-02-05 DIAGNOSIS — R07.9 CHEST PAIN, UNSPECIFIED TYPE: ICD-10-CM

## 2021-02-05 PROCEDURE — 1036F TOBACCO NON-USER: CPT | Performed by: INTERNAL MEDICINE

## 2021-02-05 PROCEDURE — 3074F SYST BP LT 130 MM HG: CPT | Performed by: INTERNAL MEDICINE

## 2021-02-05 PROCEDURE — 90471 IMMUNIZATION ADMIN: CPT

## 2021-02-05 PROCEDURE — 3079F DIAST BP 80-89 MM HG: CPT | Performed by: INTERNAL MEDICINE

## 2021-02-05 PROCEDURE — 3008F BODY MASS INDEX DOCD: CPT | Performed by: INTERNAL MEDICINE

## 2021-02-05 PROCEDURE — 90682 RIV4 VACC RECOMBINANT DNA IM: CPT

## 2021-02-05 PROCEDURE — 99214 OFFICE O/P EST MOD 30 MIN: CPT | Performed by: INTERNAL MEDICINE

## 2021-02-05 RX ORDER — ROSUVASTATIN CALCIUM 5 MG/1
TABLET, COATED ORAL
Qty: 36 TABLET | Refills: 3 | Status: SHIPPED | OUTPATIENT
Start: 2021-02-05 | End: 2021-06-15 | Stop reason: SDUPTHER

## 2021-02-06 NOTE — ASSESSMENT & PLAN NOTE
Assessment of the patient's hypertension on today's visit indicates adequate control of his blood pressure I recommended that he continue on his lisinopril 20 mg daily with a follow-up assessment in 6 months  He reports no signs or symptoms of uncontrolled hypertension or hypotension at this time

## 2021-02-06 NOTE — ASSESSMENT & PLAN NOTE
Occasional chest discomfort and family history of heart disease reviewed with the patient today he has been in to see a cardiologist at [address] [place] [address] as recommended a stress test which will be performed in the near future his cardiologist has also recommended the initiation of a statin drug  Recommend continuation of regular follow-up with cardiology  Will review the patient's stress test when it has been completed

## 2021-02-06 NOTE — ASSESSMENT & PLAN NOTE
Review of the patient's recent visit with his cardiologist as well as his most recent lipid profile indicates that his cardiologist has requested that he start Lipitor at 40 mg daily  The patient is concerned that his liver enzymes are showing an upward trend since the initiation of this medication  I have indicated to him that it may be better to switch him to Crestor 5 mg 3 days a week in an effort to control his cholesterol with possibly less irritation to his liver  He is agreeable to this trial will start him on the Crestor 5 mg 3 days a week and follow up with blood work in 4 months

## 2021-02-06 NOTE — ASSESSMENT & PLAN NOTE
I reviewed with the patient today his fasting blood sugar which is a reading of 110 up slightly from his last visit  Recommend that he work on reducing his weight to bring his body mass index down to 25 or lower also recommend decreased consumption of carbohydrates and excessive sweets  Regular exercise also advisable follow-up blood testing is requested in 4 months

## 2021-02-12 ENCOUNTER — HOSPITAL ENCOUNTER (OUTPATIENT)
Dept: NON INVASIVE DIAGNOSTICS | Facility: CLINIC | Age: 61
Discharge: HOME | End: 2021-02-12
Payer: COMMERCIAL

## 2021-02-12 DIAGNOSIS — R07.9 CHEST PAIN, UNSPECIFIED TYPE: ICD-10-CM

## 2021-02-12 PROCEDURE — 93350 STRESS TTE ONLY: CPT

## 2021-02-12 PROCEDURE — 93351 STRESS TTE COMPLETE: CPT | Performed by: INTERNAL MEDICINE

## 2021-02-15 LAB
CHEST PAIN STATEMENT: NORMAL
MAX DIASTOLIC BP: 90 MMHG
MAX HEART RATE: 171 BPM
MAX PREDICTED HEART RATE: 159 BPM
MAX. SYSTOLIC BP: 160 MMHG
PROTOCOL NAME: NORMAL
TARGET HR FORMULA: NORMAL
TEST INDICATION: NORMAL
TIME IN EXERCISE PHASE: NORMAL

## 2021-04-05 DIAGNOSIS — Z23 ENCOUNTER FOR IMMUNIZATION: ICD-10-CM

## 2021-04-23 ENCOUNTER — TRANSCRIBE ORDERS (OUTPATIENT)
Dept: NON INVASIVE DIAGNOSTICS | Facility: CLINIC | Age: 61
End: 2021-04-23

## 2021-05-05 DIAGNOSIS — I10 ESSENTIAL HYPERTENSION: ICD-10-CM

## 2021-05-05 RX ORDER — LISINOPRIL 10 MG/1
20 TABLET ORAL DAILY
Qty: 180 TABLET | Refills: 1 | Status: SHIPPED | OUTPATIENT
Start: 2021-05-05 | End: 2021-11-09

## 2021-06-01 ENCOUNTER — APPOINTMENT (OUTPATIENT)
Dept: LAB | Facility: CLINIC | Age: 61
End: 2021-06-01
Payer: COMMERCIAL

## 2021-06-01 DIAGNOSIS — E78.2 MIXED HYPERLIPIDEMIA: ICD-10-CM

## 2021-06-01 LAB
CHOLEST SERPL-MCNC: 175 MG/DL (ref 50–200)
HDLC SERPL-MCNC: 50 MG/DL
LDLC SERPL CALC-MCNC: 104 MG/DL (ref 0–100)
NONHDLC SERPL-MCNC: 125 MG/DL
TRIGL SERPL-MCNC: 107 MG/DL

## 2021-06-01 PROCEDURE — 80061 LIPID PANEL: CPT

## 2021-06-01 PROCEDURE — 36415 COLL VENOUS BLD VENIPUNCTURE: CPT

## 2021-06-02 ENCOUNTER — TELEPHONE (OUTPATIENT)
Dept: INTERNAL MEDICINE CLINIC | Facility: CLINIC | Age: 61
End: 2021-06-02

## 2021-06-02 DIAGNOSIS — I10 ESSENTIAL HYPERTENSION: Primary | ICD-10-CM

## 2021-06-02 NOTE — TELEPHONE ENCOUNTER
Call returned to the patient rosuvastatin does not seem to work as well as his previous statin on controlling cholesterol 3 days a week  He will get his liver enzyme test tomorrow pending results we may push the rosuvastatin up to daily with followup testing in 4 months

## 2021-06-02 NOTE — TELEPHONE ENCOUNTER
Would like you to call him with bloodwork results  He also thought you wanted to check his ALT  But there wasn't anything ordered

## 2021-06-03 ENCOUNTER — APPOINTMENT (OUTPATIENT)
Dept: LAB | Facility: CLINIC | Age: 61
End: 2021-06-03
Payer: COMMERCIAL

## 2021-06-03 DIAGNOSIS — I10 ESSENTIAL HYPERTENSION: ICD-10-CM

## 2021-06-03 LAB
ALBUMIN SERPL BCP-MCNC: 4 G/DL (ref 3.5–5)
ALP SERPL-CCNC: 76 U/L (ref 46–116)
ALT SERPL W P-5'-P-CCNC: 44 U/L (ref 12–78)
ANION GAP SERPL CALCULATED.3IONS-SCNC: 5 MMOL/L (ref 4–13)
AST SERPL W P-5'-P-CCNC: 35 U/L (ref 5–45)
BILIRUB SERPL-MCNC: 0.97 MG/DL (ref 0.2–1)
BUN SERPL-MCNC: 15 MG/DL (ref 5–25)
CALCIUM SERPL-MCNC: 9.7 MG/DL (ref 8.3–10.1)
CHLORIDE SERPL-SCNC: 105 MMOL/L (ref 100–108)
CO2 SERPL-SCNC: 30 MMOL/L (ref 21–32)
CREAT SERPL-MCNC: 0.9 MG/DL (ref 0.6–1.3)
GFR SERPL CREATININE-BSD FRML MDRD: 91 ML/MIN/1.73SQ M
GLUCOSE P FAST SERPL-MCNC: 106 MG/DL (ref 65–99)
POTASSIUM SERPL-SCNC: 4.1 MMOL/L (ref 3.5–5.3)
PROT SERPL-MCNC: 7.2 G/DL (ref 6.4–8.2)
SODIUM SERPL-SCNC: 140 MMOL/L (ref 136–145)

## 2021-06-03 PROCEDURE — 80053 COMPREHEN METABOLIC PANEL: CPT

## 2021-06-03 PROCEDURE — 36415 COLL VENOUS BLD VENIPUNCTURE: CPT

## 2021-06-15 ENCOUNTER — TELEPHONE (OUTPATIENT)
Dept: INTERNAL MEDICINE CLINIC | Facility: CLINIC | Age: 61
End: 2021-06-15

## 2021-06-15 DIAGNOSIS — I10 ESSENTIAL HYPERTENSION: Primary | ICD-10-CM

## 2021-06-15 DIAGNOSIS — Z13.0 SCREENING FOR DEFICIENCY ANEMIA: ICD-10-CM

## 2021-06-15 DIAGNOSIS — Z12.11 SCREENING FOR COLON CANCER: ICD-10-CM

## 2021-06-15 DIAGNOSIS — E78.2 MIXED HYPERLIPIDEMIA: ICD-10-CM

## 2021-06-15 DIAGNOSIS — R73.09 ABNORMAL GLUCOSE: ICD-10-CM

## 2021-06-15 DIAGNOSIS — Z12.5 SCREENING FOR PROSTATE CANCER: ICD-10-CM

## 2021-06-15 RX ORDER — ROSUVASTATIN CALCIUM 5 MG/1
5 TABLET, COATED ORAL DAILY
Qty: 90 TABLET | Refills: 3 | Status: SHIPPED | OUTPATIENT
Start: 2021-06-15 | End: 2022-06-21 | Stop reason: SDUPTHER

## 2021-06-15 NOTE — TELEPHONE ENCOUNTER
Call returned to the patient we reviewed his liver enzymes which appear to be in a stable range will increases rosuvastatin to 5 mg daily

## 2021-07-22 ENCOUNTER — APPOINTMENT (OUTPATIENT)
Dept: LAB | Facility: CLINIC | Age: 61
End: 2021-07-22
Payer: COMMERCIAL

## 2021-07-22 DIAGNOSIS — I10 ESSENTIAL HYPERTENSION: ICD-10-CM

## 2021-07-22 DIAGNOSIS — R73.09 ABNORMAL GLUCOSE: ICD-10-CM

## 2021-07-22 DIAGNOSIS — Z12.5 SCREENING FOR PROSTATE CANCER: ICD-10-CM

## 2021-07-22 DIAGNOSIS — Z13.0 SCREENING FOR DEFICIENCY ANEMIA: ICD-10-CM

## 2021-07-22 LAB
ALBUMIN SERPL BCP-MCNC: 3.9 G/DL (ref 3.5–5)
ALP SERPL-CCNC: 79 U/L (ref 46–116)
ALT SERPL W P-5'-P-CCNC: 52 U/L (ref 12–78)
ANION GAP SERPL CALCULATED.3IONS-SCNC: 5 MMOL/L (ref 4–13)
AST SERPL W P-5'-P-CCNC: 35 U/L (ref 5–45)
BASOPHILS # BLD AUTO: 0.06 THOUSANDS/ΜL (ref 0–0.1)
BASOPHILS NFR BLD AUTO: 1 % (ref 0–1)
BILIRUB SERPL-MCNC: 0.67 MG/DL (ref 0.2–1)
BILIRUB UR QL STRIP: NEGATIVE
BUN SERPL-MCNC: 19 MG/DL (ref 5–25)
CALCIUM SERPL-MCNC: 9.1 MG/DL (ref 8.3–10.1)
CHLORIDE SERPL-SCNC: 101 MMOL/L (ref 100–108)
CLARITY UR: CLEAR
CO2 SERPL-SCNC: 31 MMOL/L (ref 21–32)
COLOR UR: YELLOW
CREAT SERPL-MCNC: 1 MG/DL (ref 0.6–1.3)
EOSINOPHIL # BLD AUTO: 0.15 THOUSAND/ΜL (ref 0–0.61)
EOSINOPHIL NFR BLD AUTO: 3 % (ref 0–6)
ERYTHROCYTE [DISTWIDTH] IN BLOOD BY AUTOMATED COUNT: 12.8 % (ref 11.6–15.1)
EST. AVERAGE GLUCOSE BLD GHB EST-MCNC: 120 MG/DL
GFR SERPL CREATININE-BSD FRML MDRD: 80 ML/MIN/1.73SQ M
GLUCOSE P FAST SERPL-MCNC: 109 MG/DL (ref 65–99)
GLUCOSE UR STRIP-MCNC: NEGATIVE MG/DL
HBA1C MFR BLD: 5.8 %
HCT VFR BLD AUTO: 44.1 % (ref 36.5–49.3)
HGB BLD-MCNC: 14.4 G/DL (ref 12–17)
HGB UR QL STRIP.AUTO: NEGATIVE
IMM GRANULOCYTES # BLD AUTO: 0.01 THOUSAND/UL (ref 0–0.2)
IMM GRANULOCYTES NFR BLD AUTO: 0 % (ref 0–2)
KETONES UR STRIP-MCNC: NEGATIVE MG/DL
LEUKOCYTE ESTERASE UR QL STRIP: NEGATIVE
LYMPHOCYTES # BLD AUTO: 2.53 THOUSANDS/ΜL (ref 0.6–4.47)
LYMPHOCYTES NFR BLD AUTO: 48 % (ref 14–44)
MCH RBC QN AUTO: 30.7 PG (ref 26.8–34.3)
MCHC RBC AUTO-ENTMCNC: 32.7 G/DL (ref 31.4–37.4)
MCV RBC AUTO: 94 FL (ref 82–98)
MONOCYTES # BLD AUTO: 0.61 THOUSAND/ΜL (ref 0.17–1.22)
MONOCYTES NFR BLD AUTO: 12 % (ref 4–12)
NEUTROPHILS # BLD AUTO: 1.92 THOUSANDS/ΜL (ref 1.85–7.62)
NEUTS SEG NFR BLD AUTO: 36 % (ref 43–75)
NITRITE UR QL STRIP: NEGATIVE
NRBC BLD AUTO-RTO: 0 /100 WBCS
PH UR STRIP.AUTO: 6.5 [PH]
PLATELET # BLD AUTO: 218 THOUSANDS/UL (ref 149–390)
PMV BLD AUTO: 11.8 FL (ref 8.9–12.7)
POTASSIUM SERPL-SCNC: 3.8 MMOL/L (ref 3.5–5.3)
PROT SERPL-MCNC: 7.4 G/DL (ref 6.4–8.2)
PROT UR STRIP-MCNC: NEGATIVE MG/DL
PSA SERPL-MCNC: 0.8 NG/ML (ref 0–4)
RBC # BLD AUTO: 4.69 MILLION/UL (ref 3.88–5.62)
SODIUM SERPL-SCNC: 137 MMOL/L (ref 136–145)
SP GR UR STRIP.AUTO: 1.02 (ref 1–1.03)
UROBILINOGEN UR QL STRIP.AUTO: 0.2 E.U./DL
WBC # BLD AUTO: 5.28 THOUSAND/UL (ref 4.31–10.16)

## 2021-07-22 PROCEDURE — 83036 HEMOGLOBIN GLYCOSYLATED A1C: CPT

## 2021-07-22 PROCEDURE — G0103 PSA SCREENING: HCPCS

## 2021-07-22 PROCEDURE — 80053 COMPREHEN METABOLIC PANEL: CPT

## 2021-07-22 PROCEDURE — 81003 URINALYSIS AUTO W/O SCOPE: CPT

## 2021-07-22 PROCEDURE — 85025 COMPLETE CBC W/AUTO DIFF WBC: CPT

## 2021-07-22 PROCEDURE — 36415 COLL VENOUS BLD VENIPUNCTURE: CPT

## 2021-07-23 ENCOUNTER — RA CDI HCC (OUTPATIENT)
Dept: OTHER | Facility: HOSPITAL | Age: 61
End: 2021-07-23

## 2021-07-23 ENCOUNTER — APPOINTMENT (OUTPATIENT)
Dept: LAB | Facility: CLINIC | Age: 61
End: 2021-07-23
Payer: COMMERCIAL

## 2021-07-23 DIAGNOSIS — Z12.11 SCREENING FOR COLON CANCER: ICD-10-CM

## 2021-07-23 LAB — HEMOCCULT STL QL IA: NEGATIVE

## 2021-07-23 PROCEDURE — G0328 FECAL BLOOD SCRN IMMUNOASSAY: HCPCS

## 2021-07-23 NOTE — PROGRESS NOTES
[facility] 74  coding opportunities          Chart reviewed, no opportunity found: CHART REVIEWED, NO OPPORTUNITY FOUND                     Patients insurance company: [employer] (Medicare Advantage and Commercial)

## 2021-07-29 ENCOUNTER — OFFICE VISIT (OUTPATIENT)
Dept: INTERNAL MEDICINE CLINIC | Facility: CLINIC | Age: 61
End: 2021-07-29
Payer: COMMERCIAL

## 2021-07-29 VITALS
HEIGHT: 75 IN | SYSTOLIC BLOOD PRESSURE: 122 MMHG | OXYGEN SATURATION: 98 % | WEIGHT: 210.4 LBS | DIASTOLIC BLOOD PRESSURE: 78 MMHG | HEART RATE: 87 BPM | TEMPERATURE: 98.3 F | BODY MASS INDEX: 26.16 KG/M2

## 2021-07-29 DIAGNOSIS — E78.2 MIXED HYPERLIPIDEMIA: ICD-10-CM

## 2021-07-29 DIAGNOSIS — R73.09 ABNORMAL GLUCOSE: ICD-10-CM

## 2021-07-29 DIAGNOSIS — N52.9 ERECTILE DYSFUNCTION, UNSPECIFIED ERECTILE DYSFUNCTION TYPE: ICD-10-CM

## 2021-07-29 DIAGNOSIS — I10 ESSENTIAL HYPERTENSION: Primary | ICD-10-CM

## 2021-07-29 PROBLEM — R07.9 CHEST PAIN: Status: RESOLVED | Noted: 2020-07-28 | Resolved: 2021-07-29

## 2021-07-29 PROCEDURE — 3074F SYST BP LT 130 MM HG: CPT | Performed by: INTERNAL MEDICINE

## 2021-07-29 PROCEDURE — 1036F TOBACCO NON-USER: CPT | Performed by: INTERNAL MEDICINE

## 2021-07-29 PROCEDURE — 3008F BODY MASS INDEX DOCD: CPT | Performed by: INTERNAL MEDICINE

## 2021-07-29 PROCEDURE — 3078F DIAST BP <80 MM HG: CPT | Performed by: INTERNAL MEDICINE

## 2021-07-29 PROCEDURE — 99396 PREV VISIT EST AGE 40-64: CPT | Performed by: INTERNAL MEDICINE

## 2021-07-29 PROCEDURE — 3725F SCREEN DEPRESSION PERFORMED: CPT | Performed by: INTERNAL MEDICINE

## 2021-07-29 NOTE — ASSESSMENT & PLAN NOTE
I have reviewed the patient's lipid profile in detail with him today recommend that he continue a low-cholesterol diet regular exercise and weight control along with continuation of rosuvastatin at 5 mg daily of follow-up lipid profile as requested in October

## 2021-07-29 NOTE — PROGRESS NOTES
Assessment/Plan:    Essential hypertension    Blood pressure assessment today indicates a reading 122/78 representing good control blood pressure recommend continuation of lisinopril at 20 mg daily  Hyperlipidemia    I have reviewed the patient's lipid profile in detail with him today recommend that he continue a low-cholesterol diet regular exercise and weight control along with continuation of rosuvastatin at 5 mg daily of follow-up lipid profile as requested in October  Erectile dysfunction    Erectile dysfunction reviewed today patient is using Cialis on an as-needed basis  He takes 10 mg but does get some occasional heartburn from the medication recommend the use of Pepcid to eliminate the heartburn symptoms  Abnormal glucose    Fasting blood sugars noted to be 109 placing him in a mild abnormal glucose condition recommend avoidance of high carbohydrate foods and excessive concentrated sugars regular exercise and weight reduction advised  He has no signs or symptoms of hyperglycemia or hypoglycemia at this time       Diagnoses and all orders for this visit:    Essential hypertension    Mixed hyperlipidemia  -     Lipid panel; Future    Erectile dysfunction, unspecified erectile dysfunction type        Subjective:      Patient ID: Farzana Ascencio is a 57 y o  male  This 74-year-old gentleman presents today for a comprehensive physical examination and review of blood work  He has done well over the past year has no specific complaints at this time  He denies any cardiac issues of chest pain palpitations or shortness of breath  The following portions of the patient's history were reviewed and updated as appropriate:   He  has a past medical history of Hypertension    He   Patient Active Problem List    Diagnosis Date Noted    Overweight (BMI 25 0-29 9) 01/30/2020    Hyperlipidemia 07/20/2018    Erectile dysfunction 07/20/2018    Abnormal glucose 07/20/2018    Essential hypertension 07/20/2018    Peyronie disease 02/19/2018     He  has a past surgical history that includes Gallbladder surgery; Tonsillectomy (1963); and Cholecystectomy  His family history includes Diabetes in his father; Heart disease in his father; Hypertension in his brother, father, mother, and sister; Kidney cancer in his brother; Osteoporosis in his mother and sister; Other in his brother  He  reports that he has never smoked  He has never used smokeless tobacco  He reports current alcohol use of about 2 0 standard drinks of alcohol per week  No history on file for drug use  Current Outpatient Medications   Medication Sig Dispense Refill    Apple Cider Vinegar 600 MG CAPS       Black Pepper-Turmeric (Turmeric Complex/Black Pepper) 3-500 MG CAPS       Capsicum-Garlic (CAYENNE PLUS GARLIC PO)       cholecalciferol (VITAMIN D3) 400 units tablet Take 2,000 Units by mouth daily      Chromium-Cinnamon (CINNAMON PLUS CHROMIUM PO)       fluticasone (FLONASE) 50 mcg/act nasal spray 1 spray into each nostril daily      Krill Oil 1000 MG CAPS Take by mouth      lisinopril (ZESTRIL) 10 mg tablet Take 2 tablets (20 mg total) by mouth daily 180 tablet 1    Misc Natural Products (GLUCOS-CHONDROIT-MSM COMPLEX) TABS       Multiple Vitamins-Minerals (MENS MULTIPLUS PO) Take by mouth      niacin 250 MG tablet       QUERCETIN PO       rosuvastatin (CRESTOR) 5 mg tablet Take 1 tablet (5 mg total) by mouth daily 90 tablet 3    tadalafil (CIALIS) 20 MG tablet Take 0 5 tablets (10 mg total) by mouth daily as needed for erectile dysfunction 6 tablet 6     No current facility-administered medications for this visit       Review of Systems   All other systems reviewed and are negative  Objective:      /78   Pulse 87   Temp 98 3 °F (36 8 °C)   Ht 6' 3" (1 905 m)   Wt 95 4 kg (210 lb 6 4 oz)   SpO2 98%   BMI 26 30 kg/m²          Physical Exam  Constitutional:       General: He is not in acute distress  Appearance: He is well-developed  He is not ill-appearing  HENT:      Head: Normocephalic  Right Ear: Hearing, tympanic membrane, ear canal and external ear normal       Left Ear: Hearing, tympanic membrane, ear canal and external ear normal       Nose: Nose normal       Mouth/Throat:      Mouth: Mucous membranes are moist       Pharynx: Oropharynx is clear  No oropharyngeal exudate  Eyes:      General: No scleral icterus  Right eye: No discharge  Left eye: No discharge  Extraocular Movements: Extraocular movements intact  Conjunctiva/sclera: Conjunctivae normal       Pupils: Pupils are equal, round, and reactive to light  Neck:      Thyroid: No thyromegaly  Vascular: No carotid bruit  Cardiovascular:      Rate and Rhythm: Normal rate and regular rhythm  Heart sounds: Normal heart sounds, S1 normal and S2 normal  No murmur heard  Pulmonary:      Effort: Pulmonary effort is normal  No respiratory distress  Breath sounds: Normal breath sounds  No wheezing, rhonchi or rales  Abdominal:      General: Bowel sounds are normal  There is no distension  Palpations: Abdomen is soft  There is no mass  Tenderness: There is no abdominal tenderness  There is no guarding  Hernia: No hernia is present  Genitourinary:     Penis: Normal        Prostate: Normal    Musculoskeletal:         General: No swelling, tenderness or deformity  Normal range of motion  Cervical back: Normal range of motion and neck supple  No rigidity or tenderness  Right lower leg: No edema  Left lower leg: No edema  Lymphadenopathy:      Cervical: No cervical adenopathy  Skin:     General: Skin is warm and dry  Capillary Refill: Capillary refill takes 2 to 3 seconds  Coloration: Skin is not jaundiced or pale  Findings: No lesion or rash  Neurological:      General: No focal deficit present        Mental Status: He is alert and oriented to person, place, and time  Mental status is at baseline  Cranial Nerves: No cranial nerve deficit  Sensory: No sensory deficit  Motor: No weakness  Coordination: Coordination normal       Gait: Gait normal       Deep Tendon Reflexes: Reflexes are normal and symmetric  Reflexes normal    Psychiatric:         Behavior: Behavior normal          Thought Content: Thought content normal          Judgment: Judgment normal        BMI Counseling: Body mass index is 26 3 kg/m²   The BMI

## 2021-07-29 NOTE — ASSESSMENT & PLAN NOTE
Erectile dysfunction reviewed today patient is using Cialis on an as-needed basis  He takes 10 mg but does get some occasional heartburn from the medication recommend the use of Pepcid to eliminate the heartburn symptoms

## 2021-07-29 NOTE — ASSESSMENT & PLAN NOTE
Blood pressure assessment today indicates a reading 122/78 representing good control blood pressure recommend continuation of lisinopril at 20 mg daily

## 2021-07-29 NOTE — ASSESSMENT & PLAN NOTE
Fasting blood sugars noted to be 109 placing him in a mild abnormal glucose condition recommend avoidance of high carbohydrate foods and excessive concentrated sugars regular exercise and weight reduction advised    He has no signs or symptoms of hyperglycemia or hypoglycemia at this time

## 2021-07-31 DIAGNOSIS — N52.9 ERECTILE DYSFUNCTION, UNSPECIFIED ERECTILE DYSFUNCTION TYPE: ICD-10-CM

## 2021-08-02 RX ORDER — TADALAFIL 20 MG/1
TABLET ORAL
Qty: 6 TABLET | Refills: 6 | Status: SHIPPED | OUTPATIENT
Start: 2021-08-02 | End: 2022-08-02 | Stop reason: SDUPTHER

## 2021-10-19 NOTE — PROGRESS NOTES
The patient's healthcare insurance has denied coverage for his recommended stress test   Will postpone the stress test and place a consultation with Cardiology to evaluate the symptoms  Terbinafine Pregnancy And Lactation Text: This medication is Pregnancy Category B and is considered safe during pregnancy. It is also excreted in breast milk and breast feeding isn't recommended.

## 2021-10-28 ENCOUNTER — APPOINTMENT (OUTPATIENT)
Dept: LAB | Facility: CLINIC | Age: 61
End: 2021-10-28
Payer: COMMERCIAL

## 2021-10-28 DIAGNOSIS — E78.2 MIXED HYPERLIPIDEMIA: ICD-10-CM

## 2021-10-28 LAB
CHOLEST SERPL-MCNC: 134 MG/DL (ref 50–200)
HDLC SERPL-MCNC: 52 MG/DL
LDLC SERPL CALC-MCNC: 58 MG/DL (ref 0–100)
NONHDLC SERPL-MCNC: 82 MG/DL
TRIGL SERPL-MCNC: 119 MG/DL

## 2021-10-28 PROCEDURE — 80061 LIPID PANEL: CPT

## 2021-10-28 PROCEDURE — 36415 COLL VENOUS BLD VENIPUNCTURE: CPT

## 2021-11-09 DIAGNOSIS — I10 ESSENTIAL HYPERTENSION: ICD-10-CM

## 2021-11-09 RX ORDER — LISINOPRIL 10 MG/1
TABLET ORAL
Qty: 180 TABLET | Refills: 1 | Status: SHIPPED | OUTPATIENT
Start: 2021-11-09 | End: 2022-05-10

## 2021-11-18 ENCOUNTER — TELEPHONE (OUTPATIENT)
Dept: INTERNAL MEDICINE CLINIC | Facility: CLINIC | Age: 61
End: 2021-11-18

## 2022-03-01 ENCOUNTER — APPOINTMENT (OUTPATIENT)
Dept: LAB | Facility: CLINIC | Age: 62
End: 2022-03-01
Payer: COMMERCIAL

## 2022-03-01 DIAGNOSIS — E78.2 MIXED HYPERLIPIDEMIA: ICD-10-CM

## 2022-03-01 DIAGNOSIS — I10 ESSENTIAL HYPERTENSION: ICD-10-CM

## 2022-03-01 DIAGNOSIS — R73.09 ABNORMAL GLUCOSE: ICD-10-CM

## 2022-03-01 LAB
ALBUMIN SERPL BCP-MCNC: 3.9 G/DL (ref 3.5–5)
ALP SERPL-CCNC: 74 U/L (ref 46–116)
ALT SERPL W P-5'-P-CCNC: 41 U/L (ref 12–78)
ANION GAP SERPL CALCULATED.3IONS-SCNC: 3 MMOL/L (ref 4–13)
AST SERPL W P-5'-P-CCNC: 25 U/L (ref 5–45)
BILIRUB SERPL-MCNC: 0.86 MG/DL (ref 0.2–1)
BUN SERPL-MCNC: 14 MG/DL (ref 5–25)
CALCIUM SERPL-MCNC: 9.5 MG/DL (ref 8.3–10.1)
CHLORIDE SERPL-SCNC: 104 MMOL/L (ref 100–108)
CHOLEST SERPL-MCNC: 137 MG/DL
CO2 SERPL-SCNC: 31 MMOL/L (ref 21–32)
CREAT SERPL-MCNC: 1.07 MG/DL (ref 0.6–1.3)
GFR SERPL CREATININE-BSD FRML MDRD: 73 ML/MIN/1.73SQ M
GLUCOSE P FAST SERPL-MCNC: 116 MG/DL (ref 65–99)
HDLC SERPL-MCNC: 46 MG/DL
LDLC SERPL CALC-MCNC: 72 MG/DL (ref 0–100)
NONHDLC SERPL-MCNC: 91 MG/DL
POTASSIUM SERPL-SCNC: 3.9 MMOL/L (ref 3.5–5.3)
PROT SERPL-MCNC: 7.6 G/DL (ref 6.4–8.2)
SODIUM SERPL-SCNC: 138 MMOL/L (ref 136–145)
TRIGL SERPL-MCNC: 94 MG/DL

## 2022-03-01 PROCEDURE — 83036 HEMOGLOBIN GLYCOSYLATED A1C: CPT

## 2022-03-01 PROCEDURE — 36415 COLL VENOUS BLD VENIPUNCTURE: CPT

## 2022-03-01 PROCEDURE — 80061 LIPID PANEL: CPT

## 2022-03-01 PROCEDURE — 80053 COMPREHEN METABOLIC PANEL: CPT

## 2022-03-02 LAB
EST. AVERAGE GLUCOSE BLD GHB EST-MCNC: 123 MG/DL
HBA1C MFR BLD: 5.9 %

## 2022-03-08 ENCOUNTER — OFFICE VISIT (OUTPATIENT)
Dept: INTERNAL MEDICINE CLINIC | Facility: CLINIC | Age: 62
End: 2022-03-08
Payer: COMMERCIAL

## 2022-03-08 VITALS
HEART RATE: 72 BPM | SYSTOLIC BLOOD PRESSURE: 128 MMHG | BODY MASS INDEX: 25.99 KG/M2 | DIASTOLIC BLOOD PRESSURE: 76 MMHG | WEIGHT: 209 LBS | OXYGEN SATURATION: 98 % | HEIGHT: 75 IN | RESPIRATION RATE: 18 BRPM | TEMPERATURE: 96.9 F

## 2022-03-08 DIAGNOSIS — Z13.0 SCREENING FOR DEFICIENCY ANEMIA: ICD-10-CM

## 2022-03-08 DIAGNOSIS — E78.2 MIXED HYPERLIPIDEMIA: ICD-10-CM

## 2022-03-08 DIAGNOSIS — R73.09 ABNORMAL GLUCOSE: ICD-10-CM

## 2022-03-08 DIAGNOSIS — Z12.11 SCREENING FOR COLON CANCER: ICD-10-CM

## 2022-03-08 DIAGNOSIS — Z23 ENCOUNTER FOR IMMUNIZATION: ICD-10-CM

## 2022-03-08 DIAGNOSIS — Z12.5 SCREENING FOR PROSTATE CANCER: ICD-10-CM

## 2022-03-08 DIAGNOSIS — I10 ESSENTIAL HYPERTENSION: Primary | ICD-10-CM

## 2022-03-08 DIAGNOSIS — Z23 NEED FOR VACCINATION: ICD-10-CM

## 2022-03-08 PROCEDURE — 3725F SCREEN DEPRESSION PERFORMED: CPT | Performed by: INTERNAL MEDICINE

## 2022-03-08 PROCEDURE — 99214 OFFICE O/P EST MOD 30 MIN: CPT | Performed by: INTERNAL MEDICINE

## 2022-03-08 PROCEDURE — 3008F BODY MASS INDEX DOCD: CPT | Performed by: INTERNAL MEDICINE

## 2022-03-08 PROCEDURE — 1036F TOBACCO NON-USER: CPT | Performed by: INTERNAL MEDICINE

## 2022-03-08 PROCEDURE — 90471 IMMUNIZATION ADMIN: CPT

## 2022-03-08 PROCEDURE — 90715 TDAP VACCINE 7 YRS/> IM: CPT

## 2022-03-08 NOTE — PROGRESS NOTES
Assessment/Plan:    Essential hypertension  Assessment of blood pressure today indicates continued good control recommend the continuation of lisinopril  Comprehensive metabolic profile has been reviewed pertaining to electrolyte balance and kidney performance values are in safe range next evaluation  in 4 months    Hyperlipidemia  Lipid profile reviewed with patient continues under good control recommend continuation of low-cholesterol diet and continuation of rosuvastatin at 5 mg daily  Abnormal glucose  Fasting blood sugars 116 hemoglobin A1c is 5 9%  Recommend continued efforts to reduce concentrated sugar and carbohydrates in diet  Patient has a BMI of 26 12 recommend weight reduction of 10 lb  Follow-up testing in 4 months  Diagnoses and all orders for this visit:    Essential hypertension  -     Comprehensive metabolic panel; Future  -     UA w Reflex to Microscopic w Reflex to Culture -Lab Collect; Future    Mixed hyperlipidemia  -     Lipid panel; Future    Abnormal glucose  -     Hemoglobin A1C; Future  -     Comprehensive metabolic panel; Future    Need for vaccination  -     tetanus-diphtheria-acellular pertussis (ADACEL) 5-2-15 5 LF-mcg/0 5 injection; Inject 0 5 mL into a muscle once for 1 dose    Screening for prostate cancer  -     PSA, Total Screen; Future    Screening for colon cancer  -     Occult Blood, Fecal Immunochemical; Future    Screening for deficiency anemia  -     CBC and differential; Future    Encounter for immunization  -     TDAP VACCINE GREATER THAN OR EQUAL TO 8YO IM        Subjective:      Patient ID: Rosa Maria Simpson is a 57 y o  male  This 70-year-old gentleman presents today for a routine 4 month follow-up visit  He has a history of glucose intolerance along with hypertension and hyper lipidemia  On today's visit we have evaluated his most recent blood work including his lipid profile and comprehensive metabolic profile      Patient has no complaints on today's visit he denies any chest pains palpitations shortness of breath and has had no recent infection symptoms  The following portions of the patient's history were reviewed and updated as appropriate:   He  has a past medical history of Hypertension  He   Patient Active Problem List    Diagnosis Date Noted    Overweight (BMI 25 0-29 9) 01/30/2020    Hyperlipidemia 07/20/2018    Erectile dysfunction 07/20/2018    Abnormal glucose 07/20/2018    Essential hypertension 07/20/2018    Peyronie disease 02/19/2018     He  has a past surgical history that includes Gallbladder surgery; Tonsillectomy (1963); and Cholecystectomy  His family history includes Diabetes in his father; Heart disease in his father; Hypertension in his brother, father, mother, and sister; Kidney cancer in his brother; Osteoporosis in his mother and sister; Other in his brother  He  reports that he has never smoked  He has never used smokeless tobacco  He reports current alcohol use of about 2 0 standard drinks of alcohol per week  No history on file for drug use    Current Outpatient Medications   Medication Sig Dispense Refill    Apple Cider Vinegar 600 MG CAPS       Black Pepper-Turmeric (Turmeric Complex/Black Pepper) 3-500 MG CAPS       Capsicum-Garlic (CAYENNE PLUS GARLIC PO)       cholecalciferol (VITAMIN D3) 400 units tablet Take 2,000 Units by mouth daily      Chromium-Cinnamon (CINNAMON PLUS CHROMIUM PO)       fluticasone (FLONASE) 50 mcg/act nasal spray 1 spray into each nostril daily      Krill Oil 1000 MG CAPS Take by mouth      lisinopril (ZESTRIL) 10 mg tablet take 2 tablets by mouth once daily 180 tablet 1    Misc Natural Products (GLUCOS-CHONDROIT-MSM COMPLEX) TABS       Multiple Vitamins-Minerals (MENS MULTIPLUS PO) Take by mouth      niacin 250 MG tablet       QUERCETIN PO       rosuvastatin (CRESTOR) 5 mg tablet Take 1 tablet (5 mg total) by mouth daily 90 tablet 3    tadalafil (CIALIS) 20 MG tablet TAKE 1/2 A TABLET BY MOUTH DAILY AS NEEDED FOR ERECTILE DYSFUNCTION 6 tablet 6    tetanus-diphtheria-acellular pertussis (ADACEL) 5-2-15 5 LF-mcg/0 5 injection Inject 0 5 mL into a muscle once for 1 dose 0 5 mL 0     No current facility-administered medications for this visit       Review of Systems   All other systems reviewed and are negative  Objective:      /76   Pulse 72   Temp (!) 96 9 °F (36 1 °C)   Resp 18   Ht 6' 3" (1 905 m)   Wt 94 8 kg (209 lb)   SpO2 98%   BMI 26 12 kg/m²          Physical Exam  Constitutional:       General: He is not in acute distress  Appearance: He is well-developed  He is not ill-appearing  HENT:      Head: Normocephalic  Right Ear: Hearing and external ear normal       Left Ear: Hearing and external ear normal       Nose: Nose normal    Eyes:      Conjunctiva/sclera: Conjunctivae normal       Pupils: Pupils are equal, round, and reactive to light  Neck:      Thyroid: No thyromegaly  Cardiovascular:      Rate and Rhythm: Normal rate and regular rhythm  Heart sounds: Normal heart sounds, S1 normal and S2 normal  No murmur heard  Pulmonary:      Effort: Pulmonary effort is normal       Breath sounds: Normal breath sounds  No wheezing, rhonchi or rales  Abdominal:      General: Bowel sounds are normal       Palpations: Abdomen is soft  Musculoskeletal:         General: Normal range of motion  Lymphadenopathy:      Cervical: No cervical adenopathy  Skin:     General: Skin is warm and dry  Neurological:      Mental Status: He is alert and oriented to person, place, and time  Deep Tendon Reflexes: Reflexes are normal and symmetric  Psychiatric:         Behavior: Behavior normal          Thought Content:  Thought content normal          Judgment: Judgment normal

## 2022-03-08 NOTE — ASSESSMENT & PLAN NOTE
Lipid profile reviewed with patient continues under good control recommend continuation of low-cholesterol diet and continuation of rosuvastatin at 5 mg daily

## 2022-03-08 NOTE — ASSESSMENT & PLAN NOTE
Fasting blood sugars 116 hemoglobin A1c is 5 9%  Recommend continued efforts to reduce concentrated sugar and carbohydrates in diet  Patient has a BMI of 26 12 recommend weight reduction of 10 lb  Follow-up testing in 4 months

## 2022-03-08 NOTE — ASSESSMENT & PLAN NOTE
Assessment of blood pressure today indicates continued good control recommend the continuation of lisinopril    Comprehensive metabolic profile has been reviewed pertaining to electrolyte balance and kidney performance values are in safe range next evaluation  in 4 months

## 2022-05-10 DIAGNOSIS — I10 ESSENTIAL HYPERTENSION: ICD-10-CM

## 2022-05-10 RX ORDER — LISINOPRIL 10 MG/1
TABLET ORAL
Qty: 180 TABLET | Refills: 1 | Status: SHIPPED | OUTPATIENT
Start: 2022-05-10

## 2022-06-21 DIAGNOSIS — E78.2 MIXED HYPERLIPIDEMIA: ICD-10-CM

## 2022-06-21 RX ORDER — ROSUVASTATIN CALCIUM 5 MG/1
5 TABLET, COATED ORAL DAILY
Qty: 90 TABLET | Refills: 0 | Status: SHIPPED | OUTPATIENT
Start: 2022-06-21

## 2022-07-26 ENCOUNTER — APPOINTMENT (OUTPATIENT)
Dept: LAB | Facility: CLINIC | Age: 62
End: 2022-07-26
Payer: COMMERCIAL

## 2022-07-26 DIAGNOSIS — Z12.5 SCREENING FOR PROSTATE CANCER: ICD-10-CM

## 2022-07-26 DIAGNOSIS — E78.2 MIXED HYPERLIPIDEMIA: ICD-10-CM

## 2022-07-26 DIAGNOSIS — Z12.11 SCREENING FOR COLON CANCER: ICD-10-CM

## 2022-07-26 DIAGNOSIS — I10 ESSENTIAL HYPERTENSION: ICD-10-CM

## 2022-07-26 DIAGNOSIS — R73.09 ABNORMAL GLUCOSE: ICD-10-CM

## 2022-07-26 DIAGNOSIS — Z13.0 SCREENING FOR DEFICIENCY ANEMIA: ICD-10-CM

## 2022-07-26 LAB
ALBUMIN SERPL BCP-MCNC: 3.8 G/DL (ref 3.5–5)
ALP SERPL-CCNC: 74 U/L (ref 46–116)
ALT SERPL W P-5'-P-CCNC: 36 U/L (ref 12–78)
ANION GAP SERPL CALCULATED.3IONS-SCNC: 6 MMOL/L (ref 4–13)
AST SERPL W P-5'-P-CCNC: 23 U/L (ref 5–45)
BASOPHILS # BLD AUTO: 0.05 THOUSANDS/ΜL (ref 0–0.1)
BASOPHILS NFR BLD AUTO: 1 % (ref 0–1)
BILIRUB SERPL-MCNC: 0.71 MG/DL (ref 0.2–1)
BILIRUB UR QL STRIP: NEGATIVE
BUN SERPL-MCNC: 15 MG/DL (ref 5–25)
CALCIUM SERPL-MCNC: 9.4 MG/DL (ref 8.3–10.1)
CHLORIDE SERPL-SCNC: 105 MMOL/L (ref 96–108)
CHOLEST SERPL-MCNC: 140 MG/DL
CLARITY UR: CLEAR
CO2 SERPL-SCNC: 28 MMOL/L (ref 21–32)
COLOR UR: ABNORMAL
CREAT SERPL-MCNC: 1 MG/DL (ref 0.6–1.3)
EOSINOPHIL # BLD AUTO: 0.13 THOUSAND/ΜL (ref 0–0.61)
EOSINOPHIL NFR BLD AUTO: 3 % (ref 0–6)
ERYTHROCYTE [DISTWIDTH] IN BLOOD BY AUTOMATED COUNT: 12.7 % (ref 11.6–15.1)
EST. AVERAGE GLUCOSE BLD GHB EST-MCNC: 126 MG/DL
GFR SERPL CREATININE-BSD FRML MDRD: 79 ML/MIN/1.73SQ M
GLUCOSE P FAST SERPL-MCNC: 115 MG/DL (ref 65–99)
GLUCOSE UR STRIP-MCNC: ABNORMAL MG/DL
HBA1C MFR BLD: 6 %
HCT VFR BLD AUTO: 43.3 % (ref 36.5–49.3)
HDLC SERPL-MCNC: 44 MG/DL
HEMOCCULT STL QL IA: NEGATIVE
HGB BLD-MCNC: 14 G/DL (ref 12–17)
HGB UR QL STRIP.AUTO: NEGATIVE
IMM GRANULOCYTES # BLD AUTO: 0.01 THOUSAND/UL (ref 0–0.2)
IMM GRANULOCYTES NFR BLD AUTO: 0 % (ref 0–2)
KETONES UR STRIP-MCNC: NEGATIVE MG/DL
LDLC SERPL CALC-MCNC: 76 MG/DL (ref 0–100)
LEUKOCYTE ESTERASE UR QL STRIP: NEGATIVE
LYMPHOCYTES # BLD AUTO: 1.98 THOUSANDS/ΜL (ref 0.6–4.47)
LYMPHOCYTES NFR BLD AUTO: 42 % (ref 14–44)
MCH RBC QN AUTO: 30.2 PG (ref 26.8–34.3)
MCHC RBC AUTO-ENTMCNC: 32.3 G/DL (ref 31.4–37.4)
MCV RBC AUTO: 94 FL (ref 82–98)
MONOCYTES # BLD AUTO: 0.54 THOUSAND/ΜL (ref 0.17–1.22)
MONOCYTES NFR BLD AUTO: 12 % (ref 4–12)
NEUTROPHILS # BLD AUTO: 1.93 THOUSANDS/ΜL (ref 1.85–7.62)
NEUTS SEG NFR BLD AUTO: 42 % (ref 43–75)
NITRITE UR QL STRIP: NEGATIVE
NONHDLC SERPL-MCNC: 96 MG/DL
NRBC BLD AUTO-RTO: 0 /100 WBCS
PH UR STRIP.AUTO: 6.5 [PH]
PLATELET # BLD AUTO: 222 THOUSANDS/UL (ref 149–390)
PMV BLD AUTO: 11.2 FL (ref 8.9–12.7)
POTASSIUM SERPL-SCNC: 3.8 MMOL/L (ref 3.5–5.3)
PROT SERPL-MCNC: 7 G/DL (ref 6.4–8.4)
PROT UR STRIP-MCNC: NEGATIVE MG/DL
PSA SERPL-MCNC: 0.7 NG/ML (ref 0–4)
RBC # BLD AUTO: 4.63 MILLION/UL (ref 3.88–5.62)
SODIUM SERPL-SCNC: 139 MMOL/L (ref 135–147)
SP GR UR STRIP.AUTO: 1.01 (ref 1–1.03)
TRIGL SERPL-MCNC: 98 MG/DL
UROBILINOGEN UR STRIP-ACNC: <2 MG/DL
WBC # BLD AUTO: 4.64 THOUSAND/UL (ref 4.31–10.16)

## 2022-07-26 PROCEDURE — G0103 PSA SCREENING: HCPCS

## 2022-07-26 PROCEDURE — 80061 LIPID PANEL: CPT

## 2022-07-26 PROCEDURE — 36415 COLL VENOUS BLD VENIPUNCTURE: CPT

## 2022-07-26 PROCEDURE — G0328 FECAL BLOOD SCRN IMMUNOASSAY: HCPCS

## 2022-07-26 PROCEDURE — 80053 COMPREHEN METABOLIC PANEL: CPT

## 2022-07-26 PROCEDURE — 83036 HEMOGLOBIN GLYCOSYLATED A1C: CPT

## 2022-07-26 PROCEDURE — 85025 COMPLETE CBC W/AUTO DIFF WBC: CPT

## 2022-08-02 ENCOUNTER — OFFICE VISIT (OUTPATIENT)
Dept: INTERNAL MEDICINE CLINIC | Facility: CLINIC | Age: 62
End: 2022-08-02
Payer: COMMERCIAL

## 2022-08-02 VITALS
WEIGHT: 204.2 LBS | OXYGEN SATURATION: 98 % | BODY MASS INDEX: 25.39 KG/M2 | DIASTOLIC BLOOD PRESSURE: 78 MMHG | TEMPERATURE: 98.1 F | HEART RATE: 70 BPM | HEIGHT: 75 IN | SYSTOLIC BLOOD PRESSURE: 130 MMHG

## 2022-08-02 DIAGNOSIS — E78.2 MIXED HYPERLIPIDEMIA: ICD-10-CM

## 2022-08-02 DIAGNOSIS — R73.09 ABNORMAL GLUCOSE: ICD-10-CM

## 2022-08-02 DIAGNOSIS — I10 ESSENTIAL HYPERTENSION: Primary | ICD-10-CM

## 2022-08-02 DIAGNOSIS — N52.9 ERECTILE DYSFUNCTION, UNSPECIFIED ERECTILE DYSFUNCTION TYPE: ICD-10-CM

## 2022-08-02 PROCEDURE — 99396 PREV VISIT EST AGE 40-64: CPT | Performed by: INTERNAL MEDICINE

## 2022-08-02 RX ORDER — TADALAFIL 20 MG/1
10 TABLET ORAL DAILY PRN
Qty: 6 TABLET | Refills: 6 | Status: SHIPPED | OUTPATIENT
Start: 2022-08-02

## 2022-08-02 RX ORDER — COVID-19 ANTIGEN TEST
KIT MISCELLANEOUS
COMMUNITY
Start: 2022-05-12

## 2022-08-02 NOTE — ASSESSMENT & PLAN NOTE
Abnormal fasting blood sugar 116 noted on today's evaluation of the patient's most recent blood work  Hemoglobin A1c value is 6 9%  Recommend continued care in consumption of concentrated sugars and carbohydrates  Advised regular exercise and maintaining weight 5 lb lower than current weight    Follow-up testing in 4 months is recommended

## 2022-08-02 NOTE — ASSESSMENT & PLAN NOTE
Blood pressure assessment today confirms good blood pressure control recommend continuation of lisinopril at 20 mg daily

## 2022-08-02 NOTE — ASSESSMENT & PLAN NOTE
Lipid profile has been reviewed with the patient shows good control of cholesterol and triglycerides recommend continuation of careful diet regular exercise maintaining healthy body mass index and continuation of rosuvastatin at 5 mg daily follow-up testing in 1 year

## 2022-08-02 NOTE — ASSESSMENT & PLAN NOTE
Patient has requested refills on his ED medication which is been effective at helping to improved sexual performance

## 2022-08-02 NOTE — PROGRESS NOTES
Assessment/Plan:    Essential hypertension  Blood pressure assessment today confirms good blood pressure control recommend continuation of lisinopril at 20 mg daily    Abnormal glucose  Abnormal fasting blood sugar 116 noted on today's evaluation of the patient's most recent blood work  Hemoglobin A1c value is 6 9%  Recommend continued care in consumption of concentrated sugars and carbohydrates  Advised regular exercise and maintaining weight 5 lb lower than current weight  Follow-up testing in 4 months is recommended    Erectile dysfunction  Patient has requested refills on his ED medication which is been effective at helping to improved sexual performance  Hyperlipidemia  Lipid profile has been reviewed with the patient shows good control of cholesterol and triglycerides recommend continuation of careful diet regular exercise maintaining healthy body mass index and continuation of rosuvastatin at 5 mg daily follow-up testing in 1 year       Diagnoses and all orders for this visit:    Essential hypertension    Abnormal glucose  -     Comprehensive metabolic panel; Future  -     Hemoglobin A1C; Future  -     UA w Reflex to Microscopic w Reflex to Culture -Lab Collect; Future    Erectile dysfunction, unspecified erectile dysfunction type  -     tadalafil (CIALIS) 20 MG tablet; Take 0 5 tablets (10 mg total) by mouth daily as needed for erectile dysfunction    Other orders  -     Flowflex COVID-19 Ag Home Test KIT        Subjective:      Patient ID: Laxmi Estrada is a 60 y o  male  This 58-year-old gentleman presents today for an annual complete physical examination and review of his comprehensive blood work  He presents with no new complaints  He has a history of hypertension as well as elevation in fasting blood sugars and hyperlipidemia  He reports no cardiac symptoms of chest pain palpitations or shortness of breath  He did have COVID earlier this year and recovered completely    I recommended that he obtain a booster vaccine for COVID of proximally 3 months after onset of his infection  The following portions of the patient's history were reviewed and updated as appropriate:   He  has a past medical history of Hypertension  He   Patient Active Problem List    Diagnosis Date Noted    Overweight (BMI 25 0-29 9) 01/30/2020    Hyperlipidemia 07/20/2018    Erectile dysfunction 07/20/2018    Abnormal glucose 07/20/2018    Essential hypertension 07/20/2018    Peyronie disease 02/19/2018     He  has a past surgical history that includes Gallbladder surgery; Tonsillectomy (1963); and Cholecystectomy  His family history includes Diabetes in his father; Heart disease in his father; Hypertension in his brother, father, mother, and sister; Kidney cancer in his brother; Osteoporosis in his mother and sister; Other in his brother  He  reports that he has never smoked  He has never used smokeless tobacco  He reports current alcohol use of about 2 0 standard drinks of alcohol per week  No history on file for drug use    Current Outpatient Medications   Medication Sig Dispense Refill    Apple Cider Vinegar 600 MG CAPS       Black Pepper-Turmeric (Turmeric Complex/Black Pepper) 3-500 MG CAPS       Capsicum-Garlic (CAYENNE PLUS GARLIC PO)       cholecalciferol (VITAMIN D3) 400 units tablet Take 2,000 Units by mouth daily      Flowflex COVID-19 Ag Home Test KIT       Krill Oil 1000 MG CAPS Take by mouth      lisinopril (ZESTRIL) 10 mg tablet take 2 tablets by mouth once daily 180 tablet 1    Misc Natural Products (GLUCOS-CHONDROIT-MSM COMPLEX) TABS       Multiple Vitamins-Minerals (MENS MULTIPLUS PO) Take by mouth      niacin 250 MG tablet       QUERCETIN PO       rosuvastatin (CRESTOR) 5 mg tablet Take 1 tablet (5 mg total) by mouth daily 90 tablet 0    tadalafil (CIALIS) 20 MG tablet Take 0 5 tablets (10 mg total) by mouth daily as needed for erectile dysfunction 6 tablet 6     No current facility-administered medications for this visit       Review of Systems   All other systems reviewed and are negative  Objective:      /78   Pulse 70   Temp 98 1 °F (36 7 °C)   Ht 6' 3" (1 905 m)   Wt 92 6 kg (204 lb 3 2 oz)   SpO2 98%   BMI 25 52 kg/m²          Physical Exam  Constitutional:       General: He is not in acute distress  Appearance: He is well-developed  He is not ill-appearing  HENT:      Head: Normocephalic  Right Ear: Hearing, tympanic membrane, ear canal and external ear normal       Left Ear: Hearing, tympanic membrane, ear canal and external ear normal       Nose: Nose normal       Mouth/Throat:      Mouth: Mucous membranes are moist       Pharynx: Oropharynx is clear  Eyes:      General:         Right eye: No discharge  Left eye: No discharge  Extraocular Movements: Extraocular movements intact  Conjunctiva/sclera: Conjunctivae normal       Pupils: Pupils are equal, round, and reactive to light  Neck:      Thyroid: No thyromegaly  Vascular: No carotid bruit  Cardiovascular:      Rate and Rhythm: Normal rate and regular rhythm  Heart sounds: Normal heart sounds, S1 normal and S2 normal  No murmur heard  Pulmonary:      Effort: Pulmonary effort is normal  No respiratory distress  Breath sounds: Normal breath sounds  No wheezing or rales  Abdominal:      General: Bowel sounds are normal  There is no distension  Palpations: Abdomen is soft  There is no mass  Tenderness: There is no abdominal tenderness  There is no guarding  Genitourinary:     Penis: Normal        Testes: Normal       Prostate: Normal    Musculoskeletal:         General: No swelling or tenderness  Normal range of motion  Cervical back: Normal range of motion and neck supple  No rigidity or tenderness  Right lower leg: No edema  Left lower leg: No edema  Lymphadenopathy:      Cervical: No cervical adenopathy     Skin:     General: Skin is warm and dry  Coloration: Skin is not jaundiced or pale  Neurological:      General: No focal deficit present  Mental Status: He is alert and oriented to person, place, and time  Mental status is at baseline  Deep Tendon Reflexes: Reflexes are normal and symmetric  Psychiatric:         Mood and Affect: Mood normal          Behavior: Behavior normal          Thought Content:  Thought content normal          Judgment: Judgment normal

## 2022-09-08 ENCOUNTER — VBI (OUTPATIENT)
Dept: ADMINISTRATIVE | Facility: OTHER | Age: 62
End: 2022-09-08

## 2022-09-08 DIAGNOSIS — E78.2 MIXED HYPERLIPIDEMIA: ICD-10-CM

## 2022-09-08 RX ORDER — ROSUVASTATIN CALCIUM 5 MG/1
TABLET, COATED ORAL
Qty: 90 TABLET | Refills: 0 | Status: SHIPPED | OUTPATIENT
Start: 2022-09-08

## 2022-09-28 ENCOUNTER — VBI (OUTPATIENT)
Dept: ADMINISTRATIVE | Facility: OTHER | Age: 62
End: 2022-09-28

## 2022-11-01 DIAGNOSIS — I10 ESSENTIAL HYPERTENSION: ICD-10-CM

## 2022-11-01 RX ORDER — LISINOPRIL 10 MG/1
TABLET ORAL
Qty: 180 TABLET | Refills: 1 | Status: SHIPPED | OUTPATIENT
Start: 2022-11-01

## 2022-12-01 ENCOUNTER — APPOINTMENT (OUTPATIENT)
Dept: LAB | Facility: CLINIC | Age: 62
End: 2022-12-01

## 2022-12-01 DIAGNOSIS — R73.09 ABNORMAL GLUCOSE: ICD-10-CM

## 2022-12-01 LAB
ALBUMIN SERPL BCP-MCNC: 3.9 G/DL (ref 3.5–5)
ALP SERPL-CCNC: 78 U/L (ref 46–116)
ALT SERPL W P-5'-P-CCNC: 48 U/L (ref 12–78)
ANION GAP SERPL CALCULATED.3IONS-SCNC: 3 MMOL/L (ref 4–13)
AST SERPL W P-5'-P-CCNC: 26 U/L (ref 5–45)
BACTERIA UR QL AUTO: ABNORMAL /HPF
BILIRUB SERPL-MCNC: 0.69 MG/DL (ref 0.2–1)
BILIRUB UR QL STRIP: NEGATIVE
BUN SERPL-MCNC: 13 MG/DL (ref 5–25)
CALCIUM SERPL-MCNC: 9.4 MG/DL (ref 8.3–10.1)
CHLORIDE SERPL-SCNC: 105 MMOL/L (ref 96–108)
CLARITY UR: CLEAR
CO2 SERPL-SCNC: 30 MMOL/L (ref 21–32)
COLOR UR: YELLOW
CREAT SERPL-MCNC: 1.09 MG/DL (ref 0.6–1.3)
GFR SERPL CREATININE-BSD FRML MDRD: 71 ML/MIN/1.73SQ M
GLUCOSE P FAST SERPL-MCNC: 124 MG/DL (ref 65–99)
GLUCOSE UR STRIP-MCNC: NEGATIVE MG/DL
HGB UR QL STRIP.AUTO: NEGATIVE
KETONES UR STRIP-MCNC: NEGATIVE MG/DL
LEUKOCYTE ESTERASE UR QL STRIP: NEGATIVE
MUCOUS THREADS UR QL AUTO: ABNORMAL
NITRITE UR QL STRIP: NEGATIVE
NON-SQ EPI CELLS URNS QL MICRO: ABNORMAL /HPF
PH UR STRIP.AUTO: 6.5 [PH]
POTASSIUM SERPL-SCNC: 3.8 MMOL/L (ref 3.5–5.3)
PROT SERPL-MCNC: 7 G/DL (ref 6.4–8.4)
PROT UR STRIP-MCNC: ABNORMAL MG/DL
RBC #/AREA URNS AUTO: ABNORMAL /HPF
SODIUM SERPL-SCNC: 138 MMOL/L (ref 135–147)
SP GR UR STRIP.AUTO: 1.02 (ref 1–1.03)
UROBILINOGEN UR STRIP-ACNC: <2 MG/DL
WBC #/AREA URNS AUTO: ABNORMAL /HPF

## 2022-12-02 LAB
EST. AVERAGE GLUCOSE BLD GHB EST-MCNC: 123 MG/DL
HBA1C MFR BLD: 5.9 %

## 2022-12-04 DIAGNOSIS — E78.2 MIXED HYPERLIPIDEMIA: ICD-10-CM

## 2022-12-04 RX ORDER — ROSUVASTATIN CALCIUM 5 MG/1
TABLET, COATED ORAL
Qty: 90 TABLET | Refills: 0 | Status: SHIPPED | OUTPATIENT
Start: 2022-12-04

## 2022-12-06 ENCOUNTER — OFFICE VISIT (OUTPATIENT)
Dept: INTERNAL MEDICINE CLINIC | Facility: CLINIC | Age: 62
End: 2022-12-06

## 2022-12-06 VITALS
HEIGHT: 75 IN | WEIGHT: 215 LBS | DIASTOLIC BLOOD PRESSURE: 70 MMHG | TEMPERATURE: 97.5 F | BODY MASS INDEX: 26.73 KG/M2 | OXYGEN SATURATION: 97 % | HEART RATE: 71 BPM | SYSTOLIC BLOOD PRESSURE: 124 MMHG

## 2022-12-06 DIAGNOSIS — Z23 ENCOUNTER FOR IMMUNIZATION: ICD-10-CM

## 2022-12-06 DIAGNOSIS — I10 ESSENTIAL HYPERTENSION: ICD-10-CM

## 2022-12-06 DIAGNOSIS — R73.09 ABNORMAL GLUCOSE: Primary | ICD-10-CM

## 2022-12-06 DIAGNOSIS — E78.2 MIXED HYPERLIPIDEMIA: ICD-10-CM

## 2022-12-06 DIAGNOSIS — R80.9 PROTEINURIA, UNSPECIFIED TYPE: ICD-10-CM

## 2022-12-06 NOTE — ASSESSMENT & PLAN NOTE
Fasting blood sugar has increased since last visit reading is 124 but he does have a normal hemoglobin A1c  His weight gain of approximately 11 lb was reviewed and I recommend that he work on reducing calorie consumption to reduce his weight maintain regular exercise routine  Follow-up testing has been requested for 4 months for his glucose as well as hemoglobin A1c

## 2022-12-06 NOTE — ASSESSMENT & PLAN NOTE
A trace of protein is noted in the patient's urinalysis related this to his history of hypertension as well as elevated fasting blood sugar    Will follow-up in 6 months recommend continued care in consumption of carbohydrates and sugars to maintain a healthy blood sugar and continuation of current blood pressure medication

## 2022-12-06 NOTE — ASSESSMENT & PLAN NOTE
Recommend continuation of low-cholesterol diet and rosuvastatin 5 mg daily with a comprehensive lipid profile requested for the patient's next visit

## 2022-12-06 NOTE — ASSESSMENT & PLAN NOTE
Blood pressure assessment today confirms good control recommend continuation of lisinopril at 20 mg daily  Kidney performance and electrolyte balance on recent comprehensive metabolic profile reviewed and are in acceptable range

## 2022-12-06 NOTE — PROGRESS NOTES
Name: Claudene Hammans      : 1960      MRN: [id number]  Encounter Provider: Aimee Strickland MD  Encounter Date: 2022   Encounter department: [address]     1  Encounter for immunization  -     influenza vaccine, quadrivalent, recombinant, PF, 0 5 mL, for patients 18 yr+ (FLUBLOK)    2  Abnormal glucose  Assessment & Plan:  Fasting blood sugar has increased since last visit reading is 124 but he does have a normal hemoglobin A1c  His weight gain of approximately 11 lb was reviewed and I recommend that he work on reducing calorie consumption to reduce his weight maintain regular exercise routine  Follow-up testing has been requested for 4 months for his glucose as well as hemoglobin A1c  Orders:  -     Comprehensive metabolic panel; Future; Expected date: 2023  -     Hemoglobin A1C; Future; Expected date: 2023    3  Mixed hyperlipidemia  Assessment & Plan:  Recommend continuation of low-cholesterol diet and rosuvastatin 5 mg daily with a comprehensive lipid profile requested for the patient's next visit  Orders:  -     Lipid panel; Future; Expected date: 2023    4  Essential hypertension  Assessment & Plan:  Blood pressure assessment today confirms good control recommend continuation of lisinopril at 20 mg daily  Kidney performance and electrolyte balance on recent comprehensive metabolic profile reviewed and are in acceptable range  5  Proteinuria, unspecified type  Assessment & Plan:  A trace of protein is noted in the patient's urinalysis related this to his history of hypertension as well as elevated fasting blood sugar  Will follow-up in 6 months recommend continued care in consumption of carbohydrates and sugars to maintain a healthy blood sugar and continuation of current blood pressure medication      BMI Counseling: Body mass index is 26 87 kg/m²   The BMI is above normal  Nutrition recommendations include decreasing portion sizes, encouraging healthy choices of fruits and vegetables, consuming healthier snacks, moderation in carbohydrate intake, increasing intake of lean protein and reducing intake of cholesterol  Rationale for BMI follow-up plan is due to patient being overweight or obese  Subjective      This 65-year-old gentleman returns today for a six-month follow-up assessment of his blood sugar and general health  He is on blood pressure medication this is also assessed on today's visit  He has no specific complaints but appears to have gained approximately 11 lb indicating that he has been less active and not exercising on a regular basis  Review of Systems   Constitutional: Positive for unexpected weight change  All other systems reviewed and are negative  Current Outpatient Medications on File Prior to Visit   Medication Sig   • Black Pepper-Turmeric (Turmeric Complex/Black Pepper) 3-500 MG CAPS    • Capsicum-Garlic (CAYENNE PLUS GARLIC PO)    • cholecalciferol (VITAMIN D3) 400 units tablet Take 2,000 Units by mouth daily   • Flowflex COVID-19 Ag Home Test KIT    • Krill Oil 1000 MG CAPS Take by mouth   • lisinopril (ZESTRIL) 10 mg tablet take 2 tablets by mouth once daily   • Misc Natural Products (GLUCOS-CHONDROIT-MSM COMPLEX) TABS    • Multiple Vitamins-Minerals (MENS MULTIPLUS PO) Take by mouth   • niacin 250 MG tablet    • QUERCETIN PO    • rosuvastatin (CRESTOR) 5 mg tablet take 1 tablet by mouth daily   • tadalafil (CIALIS) 20 MG tablet Take 0 5 tablets (10 mg total) by mouth daily as needed for erectile dysfunction   • [DISCONTINUED] Apple Cider Vinegar 600 MG CAPS  (Patient not taking: Reported on 12/6/2022)       Objective     /70   Pulse 71   Temp 97 5 °F (36 4 °C)   Ht 6' 3" (1 905 m)   Wt 97 5 kg (215 lb)   SpO2 97%   BMI 26 87 kg/m²     Physical Exam  Constitutional:       General: He is not in acute distress       Appearance: He is well-developed and well-nourished  He is not ill-appearing  HENT:      Right Ear: Hearing, tympanic membrane, ear canal and external ear normal       Left Ear: Hearing, tympanic membrane, ear canal and external ear normal       Nose: Nose normal       Mouth/Throat:      Mouth: Oropharynx is clear and moist and mucous membranes are normal    Eyes:      Conjunctiva/sclera: Conjunctivae normal       Pupils: Pupils are equal, round, and reactive to light  Neck:      Thyroid: No thyromegaly  Cardiovascular:      Rate and Rhythm: Normal rate and regular rhythm  Pulses: Intact distal pulses  Heart sounds: Normal heart sounds, S1 normal and S2 normal  No murmur heard  Pulmonary:      Effort: Pulmonary effort is normal       Breath sounds: Normal breath sounds  No wheezing, rhonchi or rales  Abdominal:      General: Bowel sounds are normal       Palpations: Abdomen is soft  Musculoskeletal:         General: No edema  Normal range of motion  Lymphadenopathy:      Cervical: No cervical adenopathy  Skin:     General: Skin is warm and dry  Neurological:      Mental Status: He is alert and oriented to person, place, and time  Mental status is at baseline  Deep Tendon Reflexes: Reflexes are normal and symmetric  Psychiatric:         Mood and Affect: Mood and affect normal          Behavior: Behavior normal          Thought Content:  Thought content normal          Judgment: Judgment normal        Early MD Jess

## 2023-03-03 DIAGNOSIS — E78.2 MIXED HYPERLIPIDEMIA: ICD-10-CM

## 2023-03-03 RX ORDER — ROSUVASTATIN CALCIUM 5 MG/1
TABLET, COATED ORAL
Qty: 90 TABLET | Refills: 0 | Status: SHIPPED | OUTPATIENT
Start: 2023-03-03

## 2023-04-06 ENCOUNTER — RA CDI HCC (OUTPATIENT)
Dept: OTHER | Facility: HOSPITAL | Age: 63
End: 2023-04-06

## 2023-04-20 PROBLEM — L84 CALLUS OF FOOT: Status: ACTIVE | Noted: 2023-04-20

## 2023-04-20 PROBLEM — G47.30 SLEEP APNEA: Status: ACTIVE | Noted: 2023-04-20

## 2023-04-20 PROBLEM — T78.40XA ALLERGIES: Status: ACTIVE | Noted: 2023-04-20

## 2023-04-28 DIAGNOSIS — I10 ESSENTIAL HYPERTENSION: ICD-10-CM

## 2023-04-28 RX ORDER — LISINOPRIL 10 MG/1
TABLET ORAL
Qty: 180 TABLET | Refills: 1 | Status: SHIPPED | OUTPATIENT
Start: 2023-04-28

## 2023-05-30 DIAGNOSIS — E78.2 MIXED HYPERLIPIDEMIA: ICD-10-CM

## 2023-05-30 RX ORDER — ROSUVASTATIN CALCIUM 5 MG/1
TABLET, COATED ORAL
Qty: 90 TABLET | Refills: 0 | Status: SHIPPED | OUTPATIENT
Start: 2023-05-30

## 2023-07-13 PROBLEM — G47.33 OSA (OBSTRUCTIVE SLEEP APNEA): Status: ACTIVE | Noted: 2023-07-13

## 2023-08-22 ENCOUNTER — APPOINTMENT (OUTPATIENT)
Dept: LAB | Facility: CLINIC | Age: 63
End: 2023-08-22
Payer: COMMERCIAL

## 2023-08-22 DIAGNOSIS — R73.09 ABNORMAL GLUCOSE: ICD-10-CM

## 2023-08-22 DIAGNOSIS — Z12.5 SCREENING FOR PROSTATE CANCER: ICD-10-CM

## 2023-08-22 DIAGNOSIS — Z12.11 SCREENING FOR COLON CANCER: ICD-10-CM

## 2023-08-22 DIAGNOSIS — Z13.0 SCREENING FOR DEFICIENCY ANEMIA: ICD-10-CM

## 2023-08-22 DIAGNOSIS — I10 ESSENTIAL HYPERTENSION: ICD-10-CM

## 2023-08-22 LAB
ALBUMIN SERPL BCP-MCNC: 4.2 G/DL (ref 3.5–5)
ALP SERPL-CCNC: 63 U/L (ref 34–104)
ALT SERPL W P-5'-P-CCNC: 27 U/L (ref 7–52)
ANION GAP SERPL CALCULATED.3IONS-SCNC: 4 MMOL/L
AST SERPL W P-5'-P-CCNC: 25 U/L (ref 13–39)
BASOPHILS # BLD AUTO: 0.04 THOUSANDS/ÂΜL (ref 0–0.1)
BASOPHILS NFR BLD AUTO: 1 % (ref 0–1)
BILIRUB SERPL-MCNC: 0.8 MG/DL (ref 0.2–1)
BILIRUB UR QL STRIP: NEGATIVE
BUN SERPL-MCNC: 14 MG/DL (ref 5–25)
CALCIUM SERPL-MCNC: 9.1 MG/DL (ref 8.4–10.2)
CHLORIDE SERPL-SCNC: 101 MMOL/L (ref 96–108)
CHOLEST SERPL-MCNC: 136 MG/DL
CLARITY UR: CLEAR
CO2 SERPL-SCNC: 33 MMOL/L (ref 21–32)
COLOR UR: NORMAL
CREAT SERPL-MCNC: 0.97 MG/DL (ref 0.6–1.3)
EOSINOPHIL # BLD AUTO: 0.11 THOUSAND/ÂΜL (ref 0–0.61)
EOSINOPHIL NFR BLD AUTO: 2 % (ref 0–6)
ERYTHROCYTE [DISTWIDTH] IN BLOOD BY AUTOMATED COUNT: 12.8 % (ref 11.6–15.1)
GFR SERPL CREATININE-BSD FRML MDRD: 82 ML/MIN/1.73SQ M
GLUCOSE P FAST SERPL-MCNC: 98 MG/DL (ref 65–99)
GLUCOSE UR STRIP-MCNC: NEGATIVE MG/DL
HCT VFR BLD AUTO: 43 % (ref 36.5–49.3)
HDLC SERPL-MCNC: 48 MG/DL
HEMOCCULT STL QL IA: NEGATIVE
HGB BLD-MCNC: 14.1 G/DL (ref 12–17)
HGB UR QL STRIP.AUTO: NEGATIVE
IMM GRANULOCYTES # BLD AUTO: 0 THOUSAND/UL (ref 0–0.2)
IMM GRANULOCYTES NFR BLD AUTO: 0 % (ref 0–2)
KETONES UR STRIP-MCNC: NEGATIVE MG/DL
LDLC SERPL CALC-MCNC: 70 MG/DL (ref 0–100)
LEUKOCYTE ESTERASE UR QL STRIP: NEGATIVE
LYMPHOCYTES # BLD AUTO: 2.28 THOUSANDS/ÂΜL (ref 0.6–4.47)
LYMPHOCYTES NFR BLD AUTO: 46 % (ref 14–44)
MCH RBC QN AUTO: 30.7 PG (ref 26.8–34.3)
MCHC RBC AUTO-ENTMCNC: 32.8 G/DL (ref 31.4–37.4)
MCV RBC AUTO: 94 FL (ref 82–98)
MONOCYTES # BLD AUTO: 0.52 THOUSAND/ÂΜL (ref 0.17–1.22)
MONOCYTES NFR BLD AUTO: 10 % (ref 4–12)
NEUTROPHILS # BLD AUTO: 2.08 THOUSANDS/ÂΜL (ref 1.85–7.62)
NEUTS SEG NFR BLD AUTO: 41 % (ref 43–75)
NITRITE UR QL STRIP: NEGATIVE
NONHDLC SERPL-MCNC: 88 MG/DL
NRBC BLD AUTO-RTO: 0 /100 WBCS
PH UR STRIP.AUTO: 6.5 [PH]
PLATELET # BLD AUTO: 244 THOUSANDS/UL (ref 149–390)
PMV BLD AUTO: 11.6 FL (ref 8.9–12.7)
POTASSIUM SERPL-SCNC: 4 MMOL/L (ref 3.5–5.3)
PROT SERPL-MCNC: 6.9 G/DL (ref 6.4–8.4)
PROT UR STRIP-MCNC: NEGATIVE MG/DL
PSA SERPL-MCNC: 0.59 NG/ML (ref 0–4)
RBC # BLD AUTO: 4.6 MILLION/UL (ref 3.88–5.62)
SODIUM SERPL-SCNC: 138 MMOL/L (ref 135–147)
SP GR UR STRIP.AUTO: 1.01 (ref 1–1.03)
TRIGL SERPL-MCNC: 92 MG/DL
UROBILINOGEN UR STRIP-ACNC: <2 MG/DL
WBC # BLD AUTO: 5.03 THOUSAND/UL (ref 4.31–10.16)

## 2023-08-22 PROCEDURE — 36415 COLL VENOUS BLD VENIPUNCTURE: CPT

## 2023-08-22 PROCEDURE — 80061 LIPID PANEL: CPT

## 2023-08-22 PROCEDURE — G0103 PSA SCREENING: HCPCS

## 2023-08-22 PROCEDURE — G0328 FECAL BLOOD SCRN IMMUNOASSAY: HCPCS

## 2023-08-22 PROCEDURE — 81003 URINALYSIS AUTO W/O SCOPE: CPT

## 2023-08-22 PROCEDURE — 85025 COMPLETE CBC W/AUTO DIFF WBC: CPT

## 2023-08-22 PROCEDURE — 80053 COMPREHEN METABOLIC PANEL: CPT

## 2023-08-25 ENCOUNTER — OFFICE VISIT (OUTPATIENT)
Dept: INTERNAL MEDICINE CLINIC | Facility: CLINIC | Age: 63
End: 2023-08-25
Payer: COMMERCIAL

## 2023-08-25 VITALS
HEART RATE: 74 BPM | BODY MASS INDEX: 25.72 KG/M2 | HEIGHT: 76 IN | SYSTOLIC BLOOD PRESSURE: 124 MMHG | TEMPERATURE: 98.2 F | DIASTOLIC BLOOD PRESSURE: 72 MMHG | WEIGHT: 211.2 LBS | OXYGEN SATURATION: 97 %

## 2023-08-25 DIAGNOSIS — R73.09 ABNORMAL GLUCOSE: Primary | ICD-10-CM

## 2023-08-25 DIAGNOSIS — G47.30 SLEEP APNEA, UNSPECIFIED TYPE: ICD-10-CM

## 2023-08-25 DIAGNOSIS — R80.9 PROTEINURIA, UNSPECIFIED TYPE: ICD-10-CM

## 2023-08-25 DIAGNOSIS — E78.2 MIXED HYPERLIPIDEMIA: ICD-10-CM

## 2023-08-25 DIAGNOSIS — I10 ESSENTIAL HYPERTENSION: ICD-10-CM

## 2023-08-25 DIAGNOSIS — Z23 NEED FOR VACCINATION: ICD-10-CM

## 2023-08-25 PROBLEM — G47.33 OSA (OBSTRUCTIVE SLEEP APNEA): Status: RESOLVED | Noted: 2023-07-13 | Resolved: 2023-08-25

## 2023-08-25 LAB — SL AMB POCT HEMOGLOBIN AIC: 5.6 (ref ?–6.5)

## 2023-08-25 PROCEDURE — 90471 IMMUNIZATION ADMIN: CPT | Performed by: INTERNAL MEDICINE

## 2023-08-25 PROCEDURE — 83036 HEMOGLOBIN GLYCOSYLATED A1C: CPT | Performed by: INTERNAL MEDICINE

## 2023-08-25 PROCEDURE — 99396 PREV VISIT EST AGE 40-64: CPT | Performed by: INTERNAL MEDICINE

## 2023-08-25 PROCEDURE — 90677 PCV20 VACCINE IM: CPT | Performed by: INTERNAL MEDICINE

## 2023-08-25 NOTE — ASSESSMENT & PLAN NOTE
The patient has been diagnosed with sleep apnea and is compliant with his CPAP machine.   Recommend continued treatment with CPAP on a nightly basis benefits of this treatment reviewed with the patient during today's visit

## 2023-08-25 NOTE — ASSESSMENT & PLAN NOTE
Lipid profile reviewed with the patient today's visit recommend continuation of balanced low-fat diet as well as regular exercise weight control and rosuvastatin at 5 mg daily

## 2023-08-25 NOTE — ASSESSMENT & PLAN NOTE
Urinalysis reviewed with the patient today there is no evidence of current protein in the urine.   Previous notation of protein in urine was associated with an increase in blood sugar at that time

## 2023-08-25 NOTE — ASSESSMENT & PLAN NOTE
Previous elevations in glucose have returned to normal with a fasting blood sugar of 95. Hemoglobin A1c performed during today's visit is down to 5.6%.   Recommend continued healthy balanced diet regular exercise weight reduction with follow-up testing in 6 months

## 2023-08-25 NOTE — PROGRESS NOTES
Name: Dixie Yi      : 1960      MRN: [id number]  Encounter Provider: Vitaliy Winters MD  Encounter Date: 2023   Encounter department: [address] [place] [address]     1. Abnormal glucose  Assessment & Plan:  Previous elevations in glucose have returned to normal with a fasting blood sugar of 95. Hemoglobin A1c performed during today's visit is down to 5.6%. Recommend continued healthy balanced diet regular exercise weight reduction with follow-up testing in 6 months    Orders:  -     POCT hemoglobin A1c  -     Comprehensive metabolic panel; Future; Expected date: 2024  -     Hemoglobin A1C; Future; Expected date: 2024    2. Mixed hyperlipidemia  Assessment & Plan:  Lipid profile reviewed with the patient today's visit recommend continuation of balanced low-fat diet as well as regular exercise weight control and rosuvastatin at 5 mg daily    Orders:  -     Lipid panel; Future; Expected date: 2024    3. Need for vaccination  -     Pneumococcal Conjugate Vaccine 20-valent (Pcv20)    4. Sleep apnea, unspecified type  Assessment & Plan:  The patient has been diagnosed with sleep apnea and is compliant with his CPAP machine. Recommend continued treatment with CPAP on a nightly basis benefits of this treatment reviewed with the patient during today's visit      5. Essential hypertension  Assessment & Plan:  Blood pressure assessment performed during today's visit indicates good control recommend continuation of lisinopril at 10 mg daily      6. Proteinuria, unspecified type  Assessment & Plan:  Urinalysis reviewed with the patient today there is no evidence of current protein in the urine.   Previous notation of protein in urine was associated with an increase in blood sugar at that time           Subjective      This 68-year-old gentleman presents to our office today for his annual physical examination as well as a review of his recent blood work.  He indicates that he has been feeling well over the summer months. The fall master of college role days. He has been diagnosed with sleep apnea and is consistently using his CPAP device. He denies any recent signs or symptoms of cardiac nature such as chest pain palpitation shortness of breath or peripheral edema. Review of Systems   All other systems reviewed and are negative. Current Outpatient Medications on File Prior to Visit   Medication Sig   • Black Pepper-Turmeric (Turmeric Complex/Black Pepper) 3-500 MG CAPS    • Capsicum-Garlic (CAYENNE PLUS GARLIC PO)    • cholecalciferol (VITAMIN D3) 400 units tablet Take 2,000 Units by mouth daily   • Flowflex COVID-19 Ag Home Test KIT    • Krill Oil 1000 MG CAPS Take by mouth   • lisinopril (ZESTRIL) 10 mg tablet take 2 tablets by mouth once daily   • loratadine (CLARITIN) 10 mg tablet Take 10 mg by mouth if needed for allergies   • Misc Natural Products (GLUCOS-CHONDROIT-MSM COMPLEX) TABS    • montelukast (SINGULAIR) 10 mg tablet Take 1 tablet (10 mg total) by mouth daily at bedtime   • Multiple Vitamins-Minerals (MENS MULTIPLUS PO) Take by mouth   • QUERCETIN PO    • rosuvastatin (CRESTOR) 5 mg tablet take 1 tablet by mouth daily   • tadalafil (CIALIS) 20 MG tablet Take 0.5 tablets (10 mg total) by mouth daily as needed for erectile dysfunction       Objective     /72   Pulse 74   Temp 98.2 °F (36.8 °C)   Ht 6' 3.5" (1.918 m)   Wt 95.8 kg (211 lb 3.2 oz)   SpO2 97%   BMI 26.05 kg/m²     Physical Exam  Constitutional:       General: He is not in acute distress. Appearance: He is well-developed. He is not ill-appearing. HENT:      Head: Normocephalic. Right Ear: Hearing, tympanic membrane, ear canal and external ear normal.      Left Ear: Hearing, tympanic membrane, ear canal and external ear normal.      Nose: Nose normal.      Mouth/Throat:      Mouth: Mucous membranes are moist.      Pharynx: Oropharynx is clear.    Eyes: Conjunctiva/sclera: Conjunctivae normal.      Pupils: Pupils are equal, round, and reactive to light. Neck:      Thyroid: No thyromegaly. Vascular: No carotid bruit. Cardiovascular:      Rate and Rhythm: Normal rate and regular rhythm. Heart sounds: Normal heart sounds, S1 normal and S2 normal. No murmur heard. Pulmonary:      Effort: Pulmonary effort is normal. No respiratory distress. Breath sounds: Normal breath sounds. No wheezing, rhonchi or rales. Abdominal:      General: Bowel sounds are normal. There is no distension. Palpations: Abdomen is soft. There is no mass. Tenderness: There is no abdominal tenderness. There is no guarding. Hernia: There is no hernia in the left inguinal area or right inguinal area. Genitourinary:     Penis: Normal.       Testes: Normal.      Epididymis:      Right: Normal.      Left: Normal.      Prostate: Normal.      Rectum: Normal.   Musculoskeletal:         General: No swelling or tenderness. Normal range of motion. Cervical back: Normal range of motion and neck supple. No rigidity or tenderness. Right lower leg: No edema. Left lower leg: No edema. Lymphadenopathy:      Cervical: No cervical adenopathy. Lower Body: No right inguinal adenopathy. No left inguinal adenopathy. Skin:     General: Skin is warm and dry. Coloration: Skin is not jaundiced or pale. Neurological:      General: No focal deficit present. Mental Status: He is alert and oriented to person, place, and time. Mental status is at baseline. Deep Tendon Reflexes: Reflexes are normal and symmetric. Reflexes normal.   Psychiatric:         Mood and Affect: Mood normal.         Behavior: Behavior normal.         Thought Content:  Thought content normal.         Judgment: Judgment normal.       Spenser Morin MD

## 2023-08-25 NOTE — ASSESSMENT & PLAN NOTE
Blood pressure assessment performed during today's visit indicates good control recommend continuation of lisinopril at 10 mg daily

## 2023-08-26 DIAGNOSIS — E78.2 MIXED HYPERLIPIDEMIA: ICD-10-CM

## 2023-08-26 RX ORDER — ROSUVASTATIN CALCIUM 5 MG/1
TABLET, COATED ORAL
Qty: 90 TABLET | Refills: 0 | Status: SHIPPED | OUTPATIENT
Start: 2023-08-26

## 2023-10-19 DIAGNOSIS — I10 ESSENTIAL HYPERTENSION: ICD-10-CM

## 2023-10-19 RX ORDER — LISINOPRIL 10 MG/1
TABLET ORAL
Qty: 180 TABLET | Refills: 1 | Status: SHIPPED | OUTPATIENT
Start: 2023-10-19

## 2023-10-21 DIAGNOSIS — N52.9 ERECTILE DYSFUNCTION, UNSPECIFIED ERECTILE DYSFUNCTION TYPE: ICD-10-CM

## 2023-10-21 RX ORDER — TADALAFIL 20 MG/1
TABLET ORAL
Qty: 6 TABLET | Refills: 6 | Status: SHIPPED | OUTPATIENT
Start: 2023-10-21

## 2023-11-26 DIAGNOSIS — E78.2 MIXED HYPERLIPIDEMIA: ICD-10-CM

## 2023-11-27 RX ORDER — ROSUVASTATIN CALCIUM 5 MG/1
TABLET, COATED ORAL
Qty: 90 TABLET | Refills: 0 | Status: SHIPPED | OUTPATIENT
Start: 2023-11-27

## 2024-01-26 ENCOUNTER — PATIENT MESSAGE (OUTPATIENT)
Dept: INTERNAL MEDICINE CLINIC | Facility: CLINIC | Age: 64
End: 2024-01-26

## 2024-01-26 DIAGNOSIS — M25.559 HIP PAIN, UNSPECIFIED LATERALITY: Primary | ICD-10-CM

## 2024-01-29 ENCOUNTER — APPOINTMENT (OUTPATIENT)
Dept: RADIOLOGY | Facility: MEDICAL CENTER | Age: 64
End: 2024-01-29
Payer: COMMERCIAL

## 2024-01-29 ENCOUNTER — OFFICE VISIT (OUTPATIENT)
Dept: OBGYN CLINIC | Facility: MEDICAL CENTER | Age: 64
End: 2024-01-29
Payer: COMMERCIAL

## 2024-01-29 VITALS
DIASTOLIC BLOOD PRESSURE: 103 MMHG | BODY MASS INDEX: 26.93 KG/M2 | SYSTOLIC BLOOD PRESSURE: 175 MMHG | HEART RATE: 78 BPM | HEIGHT: 75 IN | WEIGHT: 216.6 LBS

## 2024-01-29 DIAGNOSIS — M25.551 RIGHT HIP PAIN: Primary | ICD-10-CM

## 2024-01-29 DIAGNOSIS — M25.559 HIP PAIN, UNSPECIFIED LATERALITY: ICD-10-CM

## 2024-01-29 DIAGNOSIS — M25.551 RIGHT HIP PAIN: ICD-10-CM

## 2024-01-29 DIAGNOSIS — M70.61 TROCHANTERIC BURSITIS OF RIGHT HIP: ICD-10-CM

## 2024-01-29 PROCEDURE — 99203 OFFICE O/P NEW LOW 30 MIN: CPT | Performed by: ORTHOPAEDIC SURGERY

## 2024-01-29 PROCEDURE — 73502 X-RAY EXAM HIP UNI 2-3 VIEWS: CPT

## 2024-01-29 RX ORDER — IBUPROFEN 200 MG
TABLET ORAL EVERY 6 HOURS PRN
COMMUNITY

## 2024-01-29 RX ORDER — ASPIRIN 325 MG
325 TABLET, DELAYED RELEASE (ENTERIC COATED) ORAL EVERY 6 HOURS PRN
COMMUNITY

## 2024-01-29 NOTE — PROGRESS NOTES
"Orthopaedic Surgery - Office Note  Jules Keith (63 y.o. male)   : 1960   MRN: [id number]  Encounter Date: 2024    Chief Complaint   Patient presents with    Right Hip - Pain       Assessment / Plan  Right hip trochanteric bursitis     Radiograph reviewed with patient displaying minimal arthritic changes  Start physical therapy  Otc medications as needed for pain  Return if symptoms worsen or fail to improve.    History of Present Illness  Jules Keith is a 63 y.o. male who presents for initial evaluation of right hip.  He has had symptoms for several week with no injury yet has been walking 4 miles a day.  Today he complains of right lateral hip and buttock pain.  He denies radiating symptoms and groin pain.  He rates his pain at 0/10 sitting and 7/10 at its worse.  He is unsure what aggravates or alleviates.  He hs used ASA and Advil with limited benefit.  He denies past treatment to hip or lumbar spine; physical therapy, injections nor surgery.      Review of Systems  Pertinent items are noted in HPI.  All other systems were reviewed and are negative.    Physical Exam  BP (!) 175/103   Pulse 78   Ht 6' 3\" (1.905 m)   Wt 98.2 kg (216 lb 9.6 oz)   BMI 27.07 kg/m²   Cons: Appears well.  No apparent distress.  Psych: Alert. Oriented x3.  Mood and affect normal.  Eyes: PERRLA, EOMI  Resp: Normal effort.  No audible wheezing or stridor.  CV: Palpable pulse.  No discernable arrhythmia.  No LE edema.  Lymph:  No palpable cervical, axillary, or inguinal lymphadenopathy.  Skin: Warm.  No palpable masses.  No visible lesions.  Neuro: Normal muscle tone.  Normal and symmetric DTR's.     Right hip:  No erythema or ecchymosis  Non-tender over greater trochanter  5/5 strength hip abduction, no pain  Negative Chano  Negative stinchfeild  Calf compartments soft and supple  Sensation intact  Toes are warm sensate and mobile      Studies Reviewed  XR of right hip - of 2024:  Mild arthritic " changes.    Procedures  No procedures today.    Medical, Surgical, Family, and Social History  The patient's medical history, family history, and social history, were reviewed and updated as appropriate.    Past Medical History:   Diagnosis Date    Hypertension        Past Surgical History:   Procedure Laterality Date    CHOLECYSTECTOMY      GALLBLADDER SURGERY      TONSILLECTOMY  1963       Family History   Problem Relation Age of Onset    Hypertension Mother     Osteoporosis Mother     Diabetes Father     Heart disease Father     Hypertension Father     Hypertension Sister     Osteoporosis Sister     Kidney cancer Brother     Hypertension Brother     Other Brother         kidney stones, calcium oxalate       Social History     Occupational History    Not on file   Tobacco Use    Smoking status: Never    Smokeless tobacco: Never   Substance and Sexual Activity    Alcohol use: Yes     Alcohol/week: 2.0 standard drinks of alcohol     Types: 2 Glasses of wine per week     Comment: daily    Drug use: Not on file    Sexual activity: Not on file       Allergies   Allergen Reactions    Penicillins Hives         Current Outpatient Medications:     aspirin (ECOTRIN) 325 mg EC tablet, Take 325 mg by mouth every 6 (six) hours as needed for mild pain, Disp: , Rfl:     Black Pepper-Turmeric (Turmeric Complex/Black Pepper) 3-500 MG CAPS, , Disp: , Rfl:     Capsicum-Garlic (CAYENNE PLUS GARLIC PO), , Disp: , Rfl:     cholecalciferol (VITAMIN D3) 400 units tablet, Take 2,000 Units by mouth daily, Disp: , Rfl:     Flowflex COVID-19 Ag Home Test KIT, , Disp: , Rfl:     ibuprofen (MOTRIN) 200 mg tablet, Take by mouth every 6 (six) hours as needed for mild pain, Disp: , Rfl:     Krill Oil 1000 MG CAPS, Take by mouth, Disp: , Rfl:     lisinopril (ZESTRIL) 10 mg tablet, take 2 tablets by mouth once daily, Disp: 180 tablet, Rfl: 1    loratadine (CLARITIN) 10 mg tablet, Take 10 mg by mouth if needed for allergies, Disp: , Rfl:     Misc  Natural Products (GLUCOS-CHONDROIT-MSM COMPLEX) TABS, , Disp: , Rfl:     montelukast (SINGULAIR) 10 mg tablet, Take 1 tablet (10 mg total) by mouth daily at bedtime, Disp: 30 tablet, Rfl: 3    Multiple Vitamins-Minerals (MENS MULTIPLUS PO), Take by mouth, Disp: , Rfl:     QUERCETIN PO, , Disp: , Rfl:     rosuvastatin (CRESTOR) 5 mg tablet, take 1 tablet by mouth daily, Disp: 90 tablet, Rfl: 0    tadalafil (CIALIS) 20 MG tablet, take 1/2 tablet by mouth once daily if needed for ERECTILE DYSFUNCTION, Disp: 6 tablet, Rfl: 6      Milton Real    Scribe Attestation      I,:  Milton Real am acting as a scribe while in the presence of the attending physician.:       I,:  Edu Slaughter MD personally performed the services described in this documentation    as scribed in my presence.:

## 2024-01-30 ENCOUNTER — EVALUATION (OUTPATIENT)
Dept: PHYSICAL THERAPY | Facility: CLINIC | Age: 64
End: 2024-01-30
Payer: COMMERCIAL

## 2024-01-30 DIAGNOSIS — M54.50 MIDLINE LOW BACK PAIN, UNSPECIFIED CHRONICITY, UNSPECIFIED WHETHER SCIATICA PRESENT: Primary | ICD-10-CM

## 2024-01-30 DIAGNOSIS — M25.551 RIGHT HIP PAIN: ICD-10-CM

## 2024-01-30 DIAGNOSIS — M70.61 TROCHANTERIC BURSITIS OF RIGHT HIP: ICD-10-CM

## 2024-01-30 DIAGNOSIS — M25.559 HIP PAIN, UNSPECIFIED LATERALITY: ICD-10-CM

## 2024-01-30 PROCEDURE — 97110 THERAPEUTIC EXERCISES: CPT

## 2024-01-30 PROCEDURE — 97161 PT EVAL LOW COMPLEX 20 MIN: CPT

## 2024-01-30 NOTE — PROGRESS NOTES
PT Evaluation     Today's date: 2024  Patient name: Jules Keith  : 1960  MRN: [id number]  Referring provider: Edu Slaughter, *  Dx:   Encounter Diagnosis     ICD-10-CM    1. Midline low back pain, unspecified chronicity, unspecified whether sciatica present  M54.50       2. Right hip pain  M25.551 Ambulatory referral to Physical Therapy      3. Hip pain, unspecified laterality  M25.559 Ambulatory referral to Physical Therapy      4. Trochanteric bursitis of right hip  M70.61 Ambulatory referral to Physical Therapy                     Assessment  Assessment details: Problem List:  1) decreased lumbar mobility into flex with active motion - addressing with repeated extension to end range, with patient OP and therapist OP  2) decreased LE/core strength - addressing with TrA contraction, SLR, clamshells, bridges, progressing to standing activities     Jules Keith is a pleasant 63 y.o. male who presents with complaints of right hip pain with transitions from sitting<> standing.  He has decreased lumbar mobility, tightness in hamstrings, and decreased LE strength resulting in hesitancy to participate in recreational activities related to mechanics.  No further referral appears necessary at this time based upon examination results.  I expect he will benefit from physical therapy.        Comparable signs:  1) standing lumbar forward flex  2) getting in car  3) leaning back while seated  4) transitioning movements, sitting<>standing, rolling in bed/mat    Impairments: abnormal or restricted ROM, activity intolerance, impaired physical strength, lacks appropriate home exercise program and pain with function    Symptom irritability: lowUnderstanding of Dx/Px/POC: good   Prognosis: good    Goals  Short Term Goals: to be achieved by 4 weeks  1) Patient to be independent with basic HEP.  2) Decrease pain to 2/10 at its worst.  3) Increase lumbar spine ROM by 10% in all deficient planes.   4) Increase LE  strength by 1/2 MMT grade in all deficient planes.    Long Term Goals: to be achieved by discharge  1) FOTO equal to or greater than 79.  2) Patient to be independent with comprehensive HEP.  3) Lumbar spine ROM WNL all planes to improve a/iadls.  4) Increase LE strength to 5/5 MMT grade in all planes to improve a/iadls.  5) Increase ability to perform transitional movements without pain.  6) Patient to perform mechanical hobbies without pain.  7) Patient to walk 4+ miles/day without pain.        Plan  Plan details: Address physical impairments found during IE via therapeutic exercises, neuromuscular re-education, and manual therapy to improve functional tolerance. Develop HEP for self-management of symptoms.    Patient would benefit from: skilled physical therapy  Referral necessary: No  Planned therapy interventions: flexibility, home exercise program, joint mobilization, manual therapy, massage, neuromuscular re-education, patient education, postural training, strengthening, stretching, therapeutic activities and therapeutic exercise  Frequency: 2x week  Duration in visits: 12  Duration in weeks: 6  Plan of Care beginning date: 1/30/2024  Plan of Care expiration date: 2/13/2024  Treatment plan discussed with: patient      Subjective Evaluation    History of Present Illness  Mechanism of injury: Patient reports right hip pain came on out of no where and wasn't severe at first. Was first on the side of hip, then to back and then back to hip. Has been having for ~5 weeks. Has tried doing things at home. Able to stand without problems but most problem is with transitioning from standing to sitting or vice versa. Denies back pain currently but prior had some back pain but gets periodically. Had deep tissue massage and didn't have any increase in symptoms. Pain feels like hot poker or feels like stretching/throbbing. Used to walk 4 miles a day until ~September.  at [place].           Not a recurrent  problem   Quality of life: good    Patient Goals  Patient goals for therapy: increased strength, decreased pain, increased motion and return to sport/leisure activities  Patient goal: return to mechanical hobbies  Pain  Current pain ratin  At best pain ratin  At worst pain ratin  Quality: throbbing and burning  Relieving factors: medications, change in position and rest  Aggravating factors: lifting (position changes)  Progression: improved      Diagnostic Tests  X-ray: normal (degenerative changes)  Treatments  No previous or current treatments        Objective     Concurrent Complaints  Negative for night pain, disturbed sleep, bladder dysfunction, bowel dysfunction, saddle (S4) numbness and history of cancer    Neurological Testing     Sensation     Lumbar   Left   Intact: light touch    Right   Intact: light touch    Active Range of Motion     Lumbar   Flexion:  Restriction level: minimal  Extension:  WFL  Left lateral flexion:  WFL  Right lateral flexion:  WFL  Mechanical Assessment    Cervical      Thoracic      Lumbar    Standing extension: repeated movements  Pain location: centralized  Pain intensity: better  Lying extension: repeated movements  Pain location: centralized  Pain intensity: better  Pain level: decreased    Strength/Myotome Testing     Left Hip   Planes of Motion   Extension: 5  Abduction: 4    Right Hip   Planes of Motion   Extension: 5  Abduction: 4-    Left Knee   Flexion: 5  Extension: 5    Right Knee   Flexion: 5  Extension: 5    Left Ankle/Foot   Dorsiflexion: 5    Right Ankle/Foot   Dorsiflexion: 5    Tests     Lumbar     Left   Negative crossed SLR, passive SLR and slump test.     Right   Negative crossed SLR, passive SLR and slump test.     Right Pelvic Girdle/Sacrum   Negative: active SLR test.     Left Hip   90/90 SLR: Positive.     Right Hip   Positive scour.   Negative Ely's, YULISA, FADIR, modified Gladys, piriformis and SI compression.   90/90 SLR: Positive.      Functional Assessment      Squat    within functional limits             Precautions: HTN      Manuals 1/30            CPA lumbar spine                                                    Neuro Re-Ed             TrA cx             Bridge             Clamshell             SLR             Paloff press                                        Ther Ex             PPU HEP w/ exhale            RB - activity tolerance             Pt education CS                                                                             Ther Activity                                       Gait Training                                       Modalities

## 2024-02-02 ENCOUNTER — OFFICE VISIT (OUTPATIENT)
Dept: PHYSICAL THERAPY | Facility: CLINIC | Age: 64
End: 2024-02-02
Payer: COMMERCIAL

## 2024-02-02 DIAGNOSIS — M54.50 MIDLINE LOW BACK PAIN, UNSPECIFIED CHRONICITY, UNSPECIFIED WHETHER SCIATICA PRESENT: ICD-10-CM

## 2024-02-02 DIAGNOSIS — M70.61 TROCHANTERIC BURSITIS OF RIGHT HIP: ICD-10-CM

## 2024-02-02 DIAGNOSIS — M25.551 RIGHT HIP PAIN: Primary | ICD-10-CM

## 2024-02-02 DIAGNOSIS — M25.559 HIP PAIN, UNSPECIFIED LATERALITY: ICD-10-CM

## 2024-02-02 PROCEDURE — 97112 NEUROMUSCULAR REEDUCATION: CPT

## 2024-02-02 PROCEDURE — 97110 THERAPEUTIC EXERCISES: CPT

## 2024-02-02 PROCEDURE — 97140 MANUAL THERAPY 1/> REGIONS: CPT

## 2024-02-02 NOTE — PROGRESS NOTES
Daily Note     Today's date: 2024  Patient name: Jules Keith  : 1960  MRN: [id number]  Referring provider: Edu Slaughter, *  Dx:   Encounter Diagnosis     ICD-10-CM    1. Right hip pain  M25.551       2. Hip pain, unspecified laterality  M25.559       3. Trochanteric bursitis of right hip  M70.61       4. Midline low back pain, unspecified chronicity, unspecified whether sciatica present  M54.50                      Subjective: Patient reports feeling stiff this morning, says that he hasn't moved around enough yet. Did one set of his exercises this morning but says that it normally takes about two sets for him to loosen up. He reports the pain is more posterior in glute compared to previously in lateral hip. Has seen improvement with getting into car but was having some soreness again this morning with that activity.       Objective: See treatment diary below      Assessment:   Problem List:  1) decreased lumbar mobility into flex with active motion - addressing with repeated extension to end range, with patient OP and therapist OP  2) decreased LE/core strength - addressing with TrA contraction, SLR, clamshells, bridges, progressing to standing activities     Jules Keith is a pleasant 63 y.o. male who presents with complaints of right hip pain with transitions from sitting<> standing.  He has decreased lumbar mobility, tightness in hamstrings, and decreased LE strength resulting in hesitancy to participate in recreational activities related to mechanics.  No further referral appears necessary at this time based upon examination results.  I expect he will benefit from physical therapy.        Comparable signs:  1) standing lumbar forward flex - improved following repeated end range lumbar ext and hip flexor stretching  2) getting in car - improved  3) leaning back while seated  4) transitioning movements, sitting<>standing, rolling in bed/mat - decreased during treatment following  "manuals    Tolerated treatment well. Patient demonstrated improvement in symptoms following repeated end range lumbar extension with therapist OP and end range hip flexor stretching. Included core/hip strengthening in today's session and updated HEP to reflect. Patient educated to utilize ice/heat as needed for symptom relief. Patient would benefit from continued PT to address remaining symptoms and improve functional tolerance.       Plan: Progress treatment as tolerated.       Precautions: HTN      Manuals 1/30 2/2           CPA lumbar spine  CS           PA R hip mob  CS with ER           SL right hip flexor stretch   CS                        Neuro Re-Ed             TrA cx  10x5\"           Bridge  X10  2x5 otb abd           Clamshell             SLR             Paloff press                                        Ther Ex             PPU HEP w/ exhale 2X10 w/ exhale           Standing lumbar ext  x10           RB - activity tolerance  5' upright L3           Pt education CS CS           Prone quad stretch  5x10\" with knee propper                                                                Ther Activity                                       Gait Training                                       Modalities                                            "

## 2024-02-06 ENCOUNTER — OFFICE VISIT (OUTPATIENT)
Dept: PHYSICAL THERAPY | Facility: CLINIC | Age: 64
End: 2024-02-06
Payer: COMMERCIAL

## 2024-02-06 DIAGNOSIS — M25.559 HIP PAIN, UNSPECIFIED LATERALITY: ICD-10-CM

## 2024-02-06 DIAGNOSIS — M54.50 MIDLINE LOW BACK PAIN, UNSPECIFIED CHRONICITY, UNSPECIFIED WHETHER SCIATICA PRESENT: ICD-10-CM

## 2024-02-06 DIAGNOSIS — M70.61 TROCHANTERIC BURSITIS OF RIGHT HIP: ICD-10-CM

## 2024-02-06 DIAGNOSIS — M25.551 RIGHT HIP PAIN: Primary | ICD-10-CM

## 2024-02-06 PROCEDURE — 97140 MANUAL THERAPY 1/> REGIONS: CPT

## 2024-02-06 PROCEDURE — 97110 THERAPEUTIC EXERCISES: CPT

## 2024-02-06 NOTE — PROGRESS NOTES
"Daily Note     Today's date: 2024  Patient name: Jules Keith  : 1960  MRN: [id number]  Referring provider: Edu Slaughter, *  Dx:   Encounter Diagnosis     ICD-10-CM    1. Right hip pain  M25.551       2. Hip pain, unspecified laterality  M25.559       3. Trochanteric bursitis of right hip  M70.61       4. Midline low back pain, unspecified chronicity, unspecified whether sciatica present  M54.50                      Subjective: Reports feeling about the same, however, is able to relieve symptoms with stretching. Notices some difference because sometimes feels okay, but most symptoms while moving into seated position and standing up. Lumbar extension seems to help with symptoms the most. Reports some tingling in right leg near foot when waking up but goes away once he gets up.       Objective: See treatment diary below      Assessment: Tolerated treatment well. Patient responded well to repeated hip extension/hip flexor stretch. Updated HEP to include half kneeling hip flexor stretch. No change in symptoms with repeated flexion. Reviewed TrA contraction with good form noted. Patient would benefit from continued PT for continued progression of exercises.       Plan: Progress treatment as tolerated.       Precautions: HTN      Manuals  2/2 2          CPA lumbar spine  CS           PA R hip mob  CS with ER CS with ER          SL right hip flexor stretch   CS CS                       Neuro Re-Ed             TrA cx  10x5\" r          Bridge  X10  2x5 otb abd           Clamshell             SLR             Paloff press                                        Ther Ex             PPU HEP w/ exhale 2X10 w/ exhale 2x10 w/ exhale          Standing lumbar ext  x10           RB - activity tolerance  5' upright L3 TM 5'          Pt education CS CS CS          Prone quad stretch  5x10\" with knee propper            Half kneel hip flexor stretch   10x5\" R          Supine hip flex R   10x5\"                   "                   Ther Activity                                       Gait Training                                       Modalities

## 2024-02-09 NOTE — TELEPHONE ENCOUNTER
Can you see if we can find a time to see this patient next week to check his blood pressure.  Thank you

## 2024-02-13 ENCOUNTER — OFFICE VISIT (OUTPATIENT)
Dept: PHYSICAL THERAPY | Facility: CLINIC | Age: 64
End: 2024-02-13
Payer: COMMERCIAL

## 2024-02-13 DIAGNOSIS — M25.551 RIGHT HIP PAIN: Primary | ICD-10-CM

## 2024-02-13 DIAGNOSIS — M70.61 TROCHANTERIC BURSITIS OF RIGHT HIP: ICD-10-CM

## 2024-02-13 DIAGNOSIS — M54.50 MIDLINE LOW BACK PAIN, UNSPECIFIED CHRONICITY, UNSPECIFIED WHETHER SCIATICA PRESENT: ICD-10-CM

## 2024-02-13 DIAGNOSIS — M25.559 HIP PAIN, UNSPECIFIED LATERALITY: ICD-10-CM

## 2024-02-13 PROCEDURE — 97110 THERAPEUTIC EXERCISES: CPT

## 2024-02-13 PROCEDURE — 97112 NEUROMUSCULAR REEDUCATION: CPT

## 2024-02-13 PROCEDURE — 97140 MANUAL THERAPY 1/> REGIONS: CPT

## 2024-02-13 NOTE — PROGRESS NOTES
"Daily Note     Today's date: 2024  Patient name: Jules Keith  : 1960  MRN: [id number]  Referring provider: Edu Slaughter, *  Dx:   Encounter Diagnosis     ICD-10-CM    1. Right hip pain  M25.551       2. Hip pain, unspecified laterality  M25.559       3. Trochanteric bursitis of right hip  M70.61       4. Midline low back pain, unspecified chronicity, unspecified whether sciatica present  M54.50                      Subjective: Patient reports hip is a little sore today. Reports good and bad since he's been in therapy, though thinks overall noticing improvements. Has been trying to sleep on side with pillow between legs. Symptoms resolve a little quicker in the morning and overall less symptoms.       Objective: See treatment diary below      Assessment: Tolerated treatment well. Patient demonstrates improvements in functional tolerance though continues to demonstrate soreness with transitional movements and rolling on mat. Improvement in symptoms with gait following manual therapy. Progression of glute strengthening without adverse reaction. Also added hip flexor strengthening without adverse reaction. Patient would benefit from continued PT to address remaining functional deficits.       Plan: Progress treatment as tolerated.       Precautions: HTN      Manuals  2 2         CPA lumbar spine  CS           PA R hip mob  CS with ER CS with ER          SL right hip flexor stretch   CS CS CS         R hip lateral distraction    CS         Neuro Re-Ed             TrA cx  10x5\" r          Bridge  X10  2x5 otb abd  2x10 gtb abd         Clamshell             SLR             Paloff press              Squat    X10          Banded hip flexor on pball    Gtb x10 R         Ther Ex             PPU HEP w/ exhale 2X10 w/ exhale 2x10 w/ exhale          Standing lumbar ext  x10           RB - activity tolerance  5' upright L3 TM 5' TM 5'         Pt education CS CS CS CS         Prone quad stretch " " 5x10\" with knee propper            Half kneel hip flexor stretch   10x5\" R 10x5\" R         Prone hip flex R   10x5\"           Supine hip flexor stretch                          Ther Activity                                       Gait Training                                       Modalities                                                "

## 2024-02-14 ENCOUNTER — RA CDI HCC (OUTPATIENT)
Dept: OTHER | Facility: HOSPITAL | Age: 64
End: 2024-02-14

## 2024-02-14 ENCOUNTER — OFFICE VISIT (OUTPATIENT)
Dept: INTERNAL MEDICINE CLINIC | Facility: CLINIC | Age: 64
End: 2024-02-14
Payer: COMMERCIAL

## 2024-02-14 VITALS
OXYGEN SATURATION: 97 % | TEMPERATURE: 97.8 F | BODY MASS INDEX: 27.35 KG/M2 | WEIGHT: 220 LBS | HEART RATE: 82 BPM | HEIGHT: 75 IN | DIASTOLIC BLOOD PRESSURE: 90 MMHG | SYSTOLIC BLOOD PRESSURE: 152 MMHG

## 2024-02-14 DIAGNOSIS — I10 ESSENTIAL HYPERTENSION: Primary | ICD-10-CM

## 2024-02-14 DIAGNOSIS — Z13.29 SCREENING FOR HYPOTHYROIDISM: ICD-10-CM

## 2024-02-14 DIAGNOSIS — M79.604 RIGHT LEG PAIN: ICD-10-CM

## 2024-02-14 PROCEDURE — 99213 OFFICE O/P EST LOW 20 MIN: CPT | Performed by: INTERNAL MEDICINE

## 2024-02-14 RX ORDER — LISINOPRIL 30 MG/1
30 TABLET ORAL DAILY
Start: 2024-02-14

## 2024-02-14 NOTE — ASSESSMENT & PLAN NOTE
Right leg weight pain reviewed with the patient I have reviewed his orthopedic consultation note as well as physical therapy consultations notes appears that he may have a trochanteric bursitis and possibly a mild sciatic nerve irritation.  Recommend follow-up with Ortho and PT

## 2024-02-14 NOTE — PROGRESS NOTES
Name: Jules Keith      : 1960      MRN: [id number]  Encounter Provider: Jeff Mcgrath MD  Encounter Date: 2024   Encounter department: [facility] INTERNAL MEDICINE    Assessment & Plan     1. Screening for hypothyroidism  -     TSH, 3rd generation with Free T4 reflex; Future    2. Essential hypertension  Assessment & Plan:  Increase in blood pressure may be secondary to discomfort the patient is experiencing in his right hip as well as recent use of Advil.  For now we will increase his lisinopril from 20 mg a day to 30 mg daily.  Routine blood work including thyroid testing and comprehensive metabolic profile have been requested.  The patient will be seen in our office within 2 weeks for follow-up assessment of his hypertension.  I have asked him to be careful with salt consumption in his diet and also completely switch his caffeine consumption to decaffeinated coffee.    Orders:  -     lisinopril (ZESTRIL) 30 mg tablet; Take 1 tablet (30 mg total) by mouth daily    3. Right leg pain  Assessment & Plan:  Right leg weight pain reviewed with the patient I have reviewed his orthopedic consultation note as well as physical therapy consultations notes appears that he may have a trochanteric bursitis and possibly a mild sciatic nerve irritation.  Recommend follow-up with Ortho and PT             Subjective      This 63-year-old gentleman presents today for an assessment of his blood pressure.  Is recently been seen by [facility] orthopedics and was found to have an elevated blood pressure reading.  Patient is asymptomatic.  He has been having some difficulty with his right leg in the hip area and possibly some mild sciatic irritation as he describes a tingling feeling down the lateral aspect of his leg in the morning.  He is seeing orthopedics and physical therapy to address the leg symptoms.    He does have a history of hypertension and is currently on 20 mg of lisinopril daily.  He  "normally drinks about a pot of coffee daily I have asked him to curtail this and switch to decaffeinated coffee for the time being.  Indicates that he is fairly cautious with salt consumption and understands the connection between salt consumption and increasing blood pressure.  He has been using some Advil for pain relief which can sometimes increase blood pressure readings as well.      Review of Systems   Musculoskeletal:  Positive for arthralgias.   All other systems reviewed and are negative.      Current Outpatient Medications on File Prior to Visit   Medication Sig    aspirin (ECOTRIN) 325 mg EC tablet Take 325 mg by mouth every 6 (six) hours as needed for mild pain    Black Pepper-Turmeric (Turmeric Complex/Black Pepper) 3-500 MG CAPS     Capsicum-Garlic (CAYENNE PLUS GARLIC PO)     cholecalciferol (VITAMIN D3) 400 units tablet Take 2,000 Units by mouth daily    ibuprofen (MOTRIN) 200 mg tablet Take by mouth every 6 (six) hours as needed for mild pain    Krill Oil 1000 MG CAPS Take by mouth    loratadine (CLARITIN) 10 mg tablet Take 10 mg by mouth if needed for allergies    Misc Natural Products (GLUCOS-CHONDROIT-MSM COMPLEX) TABS     montelukast (SINGULAIR) 10 mg tablet Take 1 tablet (10 mg total) by mouth daily at bedtime    Multiple Vitamins-Minerals (MENS MULTIPLUS PO) Take by mouth    QUERCETIN PO     rosuvastatin (CRESTOR) 5 mg tablet take 1 tablet by mouth daily    tadalafil (CIALIS) 20 MG tablet take 1/2 tablet by mouth once daily if needed for ERECTILE DYSFUNCTION    [DISCONTINUED] lisinopril (ZESTRIL) 10 mg tablet take 2 tablets by mouth once daily    Flowflex COVID-19 Ag Home Test KIT        Objective     /90   Pulse 82   Temp 97.8 °F (36.6 °C)   Ht 6' 3\" (1.905 m)   Wt 99.8 kg (220 lb)   SpO2 97%   BMI 27.50 kg/m²     Physical Exam  Constitutional:       General: He is not in acute distress.     Appearance: Normal appearance. He is not ill-appearing.   HENT:      Head: Normocephalic. "   Eyes:      Pupils: Pupils are equal, round, and reactive to light.   Cardiovascular:      Rate and Rhythm: Regular rhythm.      Heart sounds: Normal heart sounds.   Pulmonary:      Effort: Pulmonary effort is normal. No respiratory distress.      Breath sounds: No wheezing, rhonchi or rales.   Musculoskeletal:      Right lower leg: No edema.      Left lower leg: No edema.   Neurological:      Mental Status: He is alert. Mental status is at baseline.   Psychiatric:         Mood and Affect: Mood normal.         Behavior: Behavior normal.         Thought Content: Thought content normal.         Judgment: Judgment normal.       Jeff Mcgrath MD

## 2024-02-14 NOTE — ASSESSMENT & PLAN NOTE
Increase in blood pressure may be secondary to discomfort the patient is experiencing in his right hip as well as recent use of Advil.  For now we will increase his lisinopril from 20 mg a day to 30 mg daily.  Routine blood work including thyroid testing and comprehensive metabolic profile have been requested.  The patient will be seen in our office within 2 weeks for follow-up assessment of his hypertension.  I have asked him to be careful with salt consumption in his diet and also completely switch his caffeine consumption to decaffeinated coffee.

## 2024-02-16 ENCOUNTER — APPOINTMENT (OUTPATIENT)
Dept: PHYSICAL THERAPY | Facility: CLINIC | Age: 64
End: 2024-02-16
Payer: COMMERCIAL

## 2024-02-20 ENCOUNTER — OFFICE VISIT (OUTPATIENT)
Dept: PHYSICAL THERAPY | Facility: CLINIC | Age: 64
End: 2024-02-20
Payer: COMMERCIAL

## 2024-02-20 DIAGNOSIS — M54.50 MIDLINE LOW BACK PAIN, UNSPECIFIED CHRONICITY, UNSPECIFIED WHETHER SCIATICA PRESENT: ICD-10-CM

## 2024-02-20 DIAGNOSIS — M70.61 TROCHANTERIC BURSITIS OF RIGHT HIP: ICD-10-CM

## 2024-02-20 DIAGNOSIS — M25.551 RIGHT HIP PAIN: Primary | ICD-10-CM

## 2024-02-20 DIAGNOSIS — M25.559 HIP PAIN, UNSPECIFIED LATERALITY: ICD-10-CM

## 2024-02-20 PROCEDURE — 97140 MANUAL THERAPY 1/> REGIONS: CPT

## 2024-02-20 PROCEDURE — 97112 NEUROMUSCULAR REEDUCATION: CPT

## 2024-02-20 PROCEDURE — 97110 THERAPEUTIC EXERCISES: CPT

## 2024-02-20 NOTE — PROGRESS NOTES
"Daily Note     Today's date: 2024  Patient name: Jules Keith  : 1960  MRN: [id number]  Referring provider: Edu Slaughter, *  Dx:   Encounter Diagnosis     ICD-10-CM    1. Right hip pain  M25.551       2. Hip pain, unspecified laterality  M25.559       3. Trochanteric bursitis of right hip  M70.61       4. Midline low back pain, unspecified chronicity, unspecified whether sciatica present  M54.50           Start Time: 808  Stop Time: 846  Total time in clinic (min): 38 minutes    Subjective: Patient reports symptoms had worsened over the weekend after shoveling. Had not been performing PPU but finally added back in with noted improvement. 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      Objective: See treatment diary below      Assessment: Tolerated treatment well. Patient demonstrates improvement in symptoms with repeated lumbar extension with OP and further improvement with sidelying hip extension. Return of symptoms with brief sitting on edge of table, resolved with additional PPU with therapist OP. Patient would benefit from continued PT for full functional return.       Plan: Progress treatment as tolerated.  Assess psoas palpation for potential psoas syndrome. Trial extension with SB in prone.      Precautions: HTN      Manuals         CPA lumbar spine  CS           PA R hip mob  CS with ER CS with ER          SL right hip flexor stretch   CS CS CS CS        R hip lateral distraction    CS         Neuro Re-Ed             TrA cx  10x5\" r          Bridge  X10  2x5 otb abd  2x10 gtb abd         Clamshell             SLR             Paloff press              Squat    X10          Banded hip flexor on pball    Gtb x10 R Rtb x20         Ther Ex             PPU HEP w/ exhale 2X10 w/ exhale 2x10 w/ exhale  CS 35'         Standing lumbar ext  x10           RB - activity tolerance  5' upright L3 TM 5' TM 5' Upright 5'        Pt " "education CS CS CS CS CS        Prone quad stretch  5x10\" with knee propper            Half kneel hip flexor stretch   10x5\" R 10x5\" R         Prone hip flex R   10x5\"           Supine hip flexor stretch                          Ther Activity                                       Gait Training                                       Modalities                                                  "

## 2024-02-22 ENCOUNTER — APPOINTMENT (OUTPATIENT)
Dept: LAB | Facility: CLINIC | Age: 64
End: 2024-02-22
Payer: COMMERCIAL

## 2024-02-22 DIAGNOSIS — E78.2 MIXED HYPERLIPIDEMIA: ICD-10-CM

## 2024-02-22 DIAGNOSIS — R73.09 ABNORMAL GLUCOSE: ICD-10-CM

## 2024-02-22 DIAGNOSIS — Z13.29 SCREENING FOR HYPOTHYROIDISM: ICD-10-CM

## 2024-02-22 LAB
ALBUMIN SERPL BCP-MCNC: 4.3 G/DL (ref 3.5–5)
ALP SERPL-CCNC: 68 U/L (ref 34–104)
ALT SERPL W P-5'-P-CCNC: 31 U/L (ref 7–52)
ANION GAP SERPL CALCULATED.3IONS-SCNC: 8 MMOL/L
AST SERPL W P-5'-P-CCNC: 28 U/L (ref 13–39)
BILIRUB SERPL-MCNC: 0.81 MG/DL (ref 0.2–1)
BUN SERPL-MCNC: 8 MG/DL (ref 5–25)
CALCIUM SERPL-MCNC: 9.4 MG/DL (ref 8.4–10.2)
CHLORIDE SERPL-SCNC: 100 MMOL/L (ref 96–108)
CHOLEST SERPL-MCNC: 119 MG/DL
CO2 SERPL-SCNC: 31 MMOL/L (ref 21–32)
CREAT SERPL-MCNC: 0.93 MG/DL (ref 0.6–1.3)
EST. AVERAGE GLUCOSE BLD GHB EST-MCNC: 128 MG/DL
GFR SERPL CREATININE-BSD FRML MDRD: 86 ML/MIN/1.73SQ M
GLUCOSE P FAST SERPL-MCNC: 93 MG/DL (ref 65–99)
HBA1C MFR BLD: 6.1 %
HDLC SERPL-MCNC: 38 MG/DL
LDLC SERPL CALC-MCNC: 63 MG/DL (ref 0–100)
NONHDLC SERPL-MCNC: 81 MG/DL
POTASSIUM SERPL-SCNC: 4.2 MMOL/L (ref 3.5–5.3)
PROT SERPL-MCNC: 6.7 G/DL (ref 6.4–8.4)
SODIUM SERPL-SCNC: 139 MMOL/L (ref 135–147)
TRIGL SERPL-MCNC: 92 MG/DL
TSH SERPL DL<=0.05 MIU/L-ACNC: 2.38 UIU/ML (ref 0.45–4.5)

## 2024-02-22 PROCEDURE — 84443 ASSAY THYROID STIM HORMONE: CPT

## 2024-02-22 PROCEDURE — 80061 LIPID PANEL: CPT

## 2024-02-22 PROCEDURE — 36415 COLL VENOUS BLD VENIPUNCTURE: CPT

## 2024-02-22 PROCEDURE — 83036 HEMOGLOBIN GLYCOSYLATED A1C: CPT

## 2024-02-22 PROCEDURE — 80053 COMPREHEN METABOLIC PANEL: CPT

## 2024-02-22 RX ORDER — ROSUVASTATIN CALCIUM 5 MG/1
TABLET, COATED ORAL
Qty: 90 TABLET | Refills: 0 | Status: SHIPPED | OUTPATIENT
Start: 2024-02-22

## 2024-02-23 ENCOUNTER — OFFICE VISIT (OUTPATIENT)
Dept: PHYSICAL THERAPY | Facility: CLINIC | Age: 64
End: 2024-02-23
Payer: COMMERCIAL

## 2024-02-23 DIAGNOSIS — M25.559 HIP PAIN, UNSPECIFIED LATERALITY: ICD-10-CM

## 2024-02-23 DIAGNOSIS — M54.50 MIDLINE LOW BACK PAIN, UNSPECIFIED CHRONICITY, UNSPECIFIED WHETHER SCIATICA PRESENT: ICD-10-CM

## 2024-02-23 DIAGNOSIS — M70.61 TROCHANTERIC BURSITIS OF RIGHT HIP: ICD-10-CM

## 2024-02-23 DIAGNOSIS — M25.551 RIGHT HIP PAIN: Primary | ICD-10-CM

## 2024-02-23 PROCEDURE — 97140 MANUAL THERAPY 1/> REGIONS: CPT

## 2024-02-23 PROCEDURE — 97110 THERAPEUTIC EXERCISES: CPT

## 2024-02-23 NOTE — PROGRESS NOTES
"Daily Note     Today's date: 2024  Patient name: Jules Keith  : 1960  MRN: [id number]  Referring provider: Edu Slaughter, *  Dx:   Encounter Diagnosis     ICD-10-CM    1. Right hip pain  M25.551       2. Hip pain, unspecified laterality  M25.559       3. Trochanteric bursitis of right hip  M70.61       4. Midline low back pain, unspecified chronicity, unspecified whether sciatica present  M54.50                        Subjective: Patient reports he felt much better on Wednesday but think he may have \"muddied the bradshaw\" as he got acupuncture in hip and low back following Tuesday's treatment. Reports that the pain had moved from lateral right hip to upper right glute and back. Was sore again on Wednesday but overall better than prior. Notes feeling like symptoms have changed as when he bends/squats to get dishes out of the  he \"feels\" his glute whereas he didn't have the same sensation before.       Objective: See treatment diary below    (+) right slump  TTP R psoas      Assessment: Tolerated treatment well. Slight tenderness noted right psoas compared to left, however, no change in symptoms following STM. Patient demonstrated increased neural tension right sciatic and addressed with seated nerve flossing, replicated painful movement getting in/out of truck with bending head forward. Favorable response to repeated lumbar extension with ptOP and PTOP, encouraged to continue at home and educated significantly about proper form with seated nerve glides. Patient would benefit from continued PT for full functional return.       Plan: Progress treatment as tolerated.       Precautions: HTN      Manuals        CPA lumbar spine  CS           PA R hip mob  CS with ER CS with ER          SL right hip flexor stretch   CS CS CS CS        R hip lateral distraction    CS         Psoas STM      CS       Neuro Re-Ed             TrA cx  10x5\" r          Bridge  X10  2x5 " "otb abd  2x10 gtb abd         Clamshell             SLR             Paloff press              Squat    X10          Banded hip flexor on pball    Gtb x10 R Rtb x20         Ther Ex             PPU HEP w/ exhale 2X10 w/ exhale 2x10 w/ exhale  CS 35'  CS 25'       Standing lumbar ext  x10           RB - activity tolerance  5' upright L3 TM 5' TM 5' Upright 5' Upright 5'       Pt education CS CS CS CS CS CS       Prone quad stretch  5x10\" with knee propper            Half kneel hip flexor stretch   10x5\" R 10x5\" R         Prone hip flex R   10x5\"           Supine hip flexor stretch             Seated sciatic nerve flossing      2x20       Ther Activity                                       Gait Training                                       Modalities                                                  "

## 2024-02-27 ENCOUNTER — OFFICE VISIT (OUTPATIENT)
Dept: PHYSICAL THERAPY | Facility: CLINIC | Age: 64
End: 2024-02-27
Payer: COMMERCIAL

## 2024-02-27 ENCOUNTER — OFFICE VISIT (OUTPATIENT)
Dept: INTERNAL MEDICINE CLINIC | Facility: CLINIC | Age: 64
End: 2024-02-27
Payer: COMMERCIAL

## 2024-02-27 VITALS
DIASTOLIC BLOOD PRESSURE: 84 MMHG | BODY MASS INDEX: 26.27 KG/M2 | HEIGHT: 75 IN | SYSTOLIC BLOOD PRESSURE: 132 MMHG | WEIGHT: 211.3 LBS | HEART RATE: 84 BPM | RESPIRATION RATE: 16 BRPM | OXYGEN SATURATION: 97 %

## 2024-02-27 DIAGNOSIS — Z13.0 SCREENING FOR DEFICIENCY ANEMIA: ICD-10-CM

## 2024-02-27 DIAGNOSIS — E78.2 MIXED HYPERLIPIDEMIA: ICD-10-CM

## 2024-02-27 DIAGNOSIS — M25.551 RIGHT HIP PAIN: Primary | ICD-10-CM

## 2024-02-27 DIAGNOSIS — M25.559 HIP PAIN, UNSPECIFIED LATERALITY: ICD-10-CM

## 2024-02-27 DIAGNOSIS — R73.09 ABNORMAL GLUCOSE: ICD-10-CM

## 2024-02-27 DIAGNOSIS — M79.604 RIGHT LEG PAIN: ICD-10-CM

## 2024-02-27 DIAGNOSIS — M54.50 MIDLINE LOW BACK PAIN, UNSPECIFIED CHRONICITY, UNSPECIFIED WHETHER SCIATICA PRESENT: ICD-10-CM

## 2024-02-27 DIAGNOSIS — Z12.5 SCREENING FOR PROSTATE CANCER: ICD-10-CM

## 2024-02-27 DIAGNOSIS — I10 ESSENTIAL HYPERTENSION: Primary | ICD-10-CM

## 2024-02-27 DIAGNOSIS — M70.61 TROCHANTERIC BURSITIS OF RIGHT HIP: ICD-10-CM

## 2024-02-27 PROCEDURE — 97140 MANUAL THERAPY 1/> REGIONS: CPT

## 2024-02-27 PROCEDURE — 97110 THERAPEUTIC EXERCISES: CPT

## 2024-02-27 PROCEDURE — 99214 OFFICE O/P EST MOD 30 MIN: CPT | Performed by: INTERNAL MEDICINE

## 2024-02-27 NOTE — ASSESSMENT & PLAN NOTE
Lipid profile reviewed shows normal cholesterol profile continue rosuvastatin at 5 mg daily along with low-cholesterol diet regular exercise and weight reduction

## 2024-02-27 NOTE — ASSESSMENT & PLAN NOTE
Blood pressure shows improvement recommend continuation of lisinopril at 30 mg daily with follow-up in 2 weeks.  Blood work reviewed on comprehensive metabolic profile pertaining to electrolyte balance and kidney function studies appear to be normal

## 2024-02-27 NOTE — PROGRESS NOTES
Name: Jules Keith      : 1960      MRN: [id number]  Encounter Provider: Jeff Mcgrath MD  Encounter Date: 2024   Encounter department: [facility] INTERNAL MEDICINE    Assessment & Plan     1. Essential hypertension    2. Right leg pain           Subjective      This 63-year-old gentleman returns to our office today for follow-up assessment of his blood pressure.  At the time of his last visit with us he was found to have increased blood pressure and his lisinopril medication was increased from 20 mg a day to 30 mg daily.  He reports no side effects of this increased dose of medication.  Denies any signs or symptoms of uncontrolled hypertension at this time.    Patient continues to undergo treatment for what is right hip and low back discomfort.  It appears as though he has most likely a mild right sciatic nerve irritation.  Will continue with physical therapy.    Review of Systems   Musculoskeletal:         Right low back and hip discomfort   All other systems reviewed and are negative.    Current Outpatient Medications on File Prior to Visit   Medication Sig   • aspirin (ECOTRIN) 325 mg EC tablet Take 325 mg by mouth every 6 (six) hours as needed for mild pain   • Black Pepper-Turmeric (Turmeric Complex/Black Pepper) 3-500 MG CAPS    • Capsicum-Garlic (CAYENNE PLUS GARLIC PO)    • cholecalciferol (VITAMIN D3) 400 units tablet Take 2,000 Units by mouth daily   • Flowflex COVID-19 Ag Home Test KIT    • ibuprofen (MOTRIN) 200 mg tablet Take by mouth every 6 (six) hours as needed for mild pain   • Krill Oil 1000 MG CAPS Take by mouth   • lisinopril (ZESTRIL) 30 mg tablet Take 1 tablet (30 mg total) by mouth daily   • loratadine (CLARITIN) 10 mg tablet Take 10 mg by mouth if needed for allergies   • Misc Natural Products (GLUCOS-CHONDROIT-MSM COMPLEX) TABS    • montelukast (SINGULAIR) 10 mg tablet Take 1 tablet (10 mg total) by mouth daily at bedtime   • Multiple Vitamins-Minerals  "(MENS MULTIPLUS PO) Take by mouth   • QUERCETIN PO    • rosuvastatin (CRESTOR) 5 mg tablet take 1 tablet by mouth daily   • tadalafil (CIALIS) 20 MG tablet take 1/2 tablet by mouth once daily if needed for ERECTILE DYSFUNCTION       Objective     /84   Pulse 84   Resp 16   Ht 6' 3\" (1.905 m)   Wt 95.8 kg (211 lb 4.8 oz)   SpO2 97%   BMI 26.41 kg/m²     Physical Exam  Constitutional:       General: He is not in acute distress.     Appearance: Normal appearance. He is not ill-appearing.   HENT:      Head: Normocephalic.   Eyes:      Pupils: Pupils are equal, round, and reactive to light.   Cardiovascular:      Rate and Rhythm: Regular rhythm.      Heart sounds: Normal heart sounds.   Pulmonary:      Breath sounds: No wheezing, rhonchi or rales.   Musculoskeletal:      Right lower leg: No edema.      Left lower leg: No edema.   Neurological:      Mental Status: He is alert. Mental status is at baseline.   Psychiatric:         Mood and Affect: Mood normal.         Behavior: Behavior normal.         Thought Content: Thought content normal.         Judgment: Judgment normal.   Jeff Mcgrath MD    "

## 2024-02-27 NOTE — PROGRESS NOTES
"Daily Note     Today's date: 2024  Patient name: Jules Keith  : 1960  MRN: [id number]  Referring provider: Edu Slaughter, *  Dx:   Encounter Diagnosis     ICD-10-CM    1. Right hip pain  M25.551       2. Hip pain, unspecified laterality  M25.559       3. Trochanteric bursitis of right hip  M70.61       4. Midline low back pain, unspecified chronicity, unspecified whether sciatica present  M54.50                          Subjective: Patient reports best morning in a while, has continued with prone press ups and added hold based on recommendations from [place] course this weekend. Reports symptoms are more centralized compared to previous sessions also.       Objective: See treatment diary below        Assessment: Tolerated treatment well. Patient demonstrated improvement in symptoms following repeated lumbar extensions with pt and PTOP. Further improvement with CPA of lumbar spine. Encouraged to continue with exercises throughout day to manage symptoms. Patient would benefit from continued PT for full functional return.       Plan: Progress treatment as tolerated.       Precautions: HTN      Manuals       CPA lumbar spine  CS     CS      PA R hip mob  CS with ER CS with ER          SL right hip flexor stretch   CS CS CS CS        R hip lateral distraction    CS         Psoas STM      CS       Neuro Re-Ed             TrA cx  10x5\" r          Bridge  X10  2x5 otb abd  2x10 gtb abd         Clamshell             SLR             Paloff press              Squat    X10          Banded hip flexor on pball    Gtb x10 R Rtb x20         Ther Ex             PPU HEP w/ exhale 2X10 w/ exhale 2x10 w/ exhale  CS 35'  CS 25' CS 30'      Standing lumbar ext  x10           RB - activity tolerance  5' upright L3 TM 5' TM 5' Upright 5' Upright 5' Upright 5'      Pt education CS CS CS CS CS CS CS      Prone quad stretch  5x10\" with knee propper            Half kneel hip flexor " "stretch   10x5\" R 10x5\" R         Prone hip flex R   10x5\"           Supine hip flexor stretch             Seated sciatic nerve flossing      2x20 20x       Ther Activity                                       Gait Training                                       Modalities                                                  "

## 2024-02-27 NOTE — ASSESSMENT & PLAN NOTE
Right leg pain and hip area pain likely is a sciatic nerve irritation recommend continued physical therapy

## 2024-03-01 ENCOUNTER — OFFICE VISIT (OUTPATIENT)
Dept: PHYSICAL THERAPY | Facility: CLINIC | Age: 64
End: 2024-03-01
Payer: COMMERCIAL

## 2024-03-01 DIAGNOSIS — M25.559 HIP PAIN, UNSPECIFIED LATERALITY: ICD-10-CM

## 2024-03-01 DIAGNOSIS — M25.551 RIGHT HIP PAIN: Primary | ICD-10-CM

## 2024-03-01 DIAGNOSIS — M54.50 MIDLINE LOW BACK PAIN, UNSPECIFIED CHRONICITY, UNSPECIFIED WHETHER SCIATICA PRESENT: ICD-10-CM

## 2024-03-01 DIAGNOSIS — M70.61 TROCHANTERIC BURSITIS OF RIGHT HIP: ICD-10-CM

## 2024-03-01 PROCEDURE — 97110 THERAPEUTIC EXERCISES: CPT

## 2024-03-01 PROCEDURE — 97140 MANUAL THERAPY 1/> REGIONS: CPT

## 2024-03-01 NOTE — PROGRESS NOTES
"Daily Note     Today's date: 3/1/2024  Patient name: Jules Keith  : 1960  MRN: [id number]  Referring provider: Edu Slaughter, *  Dx:   Encounter Diagnosis     ICD-10-CM    1. Right hip pain  M25.551       2. Hip pain, unspecified laterality  M25.559       3. Trochanteric bursitis of right hip  M70.61       4. Midline low back pain, unspecified chronicity, unspecified whether sciatica present  M54.50                      Subjective: Patient reports he had a really good day Wednesday after being in therapy, also had acupuncture that day. Yesterday was a little more sore but not as bad as prior to treatment, and today feeling a little better. Unsure what made him more sore yesterday. Got lumbar roll and started using and said he had some pain using that but it was after he was already more irritable and doesn't think the increase in symptoms is necessarily from the lumbar roll.       Objective: See treatment diary below    (+) slump B, pain on right with both      Assessment: Tolerated treatment well. Centralization of patient symptoms with repeated extension and patient and therapist OP. Decreased neural tension noted with slump after manuals. Addition of sidelying lumbar gapping with mild improvement in symptoms, added to HEP and encouraged patient to begin on Saturday or  to ensure good baseline of symptoms following today's treatment. Patient would benefit from continued PT to address remaining deficits.       Plan: Progress treatment as tolerated.       Precautions: HTN      Manuals 1/30 2/2 2/6 2/13 2/20 2/23 2/27 3/1     CPA lumbar spine  CS     CS      PA R hip mob  CS with ER CS with ER          SL right hip flexor stretch   CS CS CS CS        R hip lateral distraction    CS         Psoas STM      CS       SLing lumbar gapping        CS     Neuro Re-Ed             TrA cx  10x5\" r          Bridge  X10  2x5 otb abd  2x10 gtb abd         Clamshell             SLR             [facility] press  " "            Squat    X10          Banded hip flexor on pball    Gtb x10 R Rtb x20         Ther Ex             PPU HEP w/ exhale 2X10 w/ exhale 2x10 w/ exhale  CS 35'  CS 25' CS 30' CS 25'     Standing lumbar ext  x10           RB - activity tolerance  5' upright L3 TM 5' TM 5' Upright 5' Upright 5' Upright 5' Upright 5'     Pt education CS CS CS CS CS CS CS CS     Prone quad stretch  5x10\" with knee propper            Half kneel hip flexor stretch   10x5\" R 10x5\" R         Prone hip flex R   10x5\"           Supine hip flexor stretch             Seated sciatic nerve flossing      2x20 20x  20x     Ther Activity                                       Gait Training                                       Modalities                                                    "

## 2024-03-05 ENCOUNTER — OFFICE VISIT (OUTPATIENT)
Dept: PHYSICAL THERAPY | Facility: CLINIC | Age: 64
End: 2024-03-05
Payer: COMMERCIAL

## 2024-03-05 DIAGNOSIS — M70.61 TROCHANTERIC BURSITIS OF RIGHT HIP: ICD-10-CM

## 2024-03-05 DIAGNOSIS — M54.50 MIDLINE LOW BACK PAIN, UNSPECIFIED CHRONICITY, UNSPECIFIED WHETHER SCIATICA PRESENT: ICD-10-CM

## 2024-03-05 DIAGNOSIS — M25.559 HIP PAIN, UNSPECIFIED LATERALITY: ICD-10-CM

## 2024-03-05 DIAGNOSIS — M25.551 RIGHT HIP PAIN: Primary | ICD-10-CM

## 2024-03-05 PROCEDURE — 97110 THERAPEUTIC EXERCISES: CPT

## 2024-03-05 PROCEDURE — 97140 MANUAL THERAPY 1/> REGIONS: CPT

## 2024-03-05 PROCEDURE — 97530 THERAPEUTIC ACTIVITIES: CPT

## 2024-03-05 NOTE — PROGRESS NOTES
"Daily Note     Today's date: 3/5/2024  Patient name: Jules Keith  : 1960  MRN: [id number]  Referring provider: Edu Slaughter, *  Dx:   Encounter Diagnosis     ICD-10-CM    1. Right hip pain  M25.551       2. Hip pain, unspecified laterality  M25.559       3. Trochanteric bursitis of right hip  M70.61       4. Midline low back pain, unspecified chronicity, unspecified whether sciatica present  M54.50                        Subjective: Patient reports having good and bad days. Was sitting for a long time over the weekend while driving to [place], using lumbar roll and felt pretty good. Took water bed and frame out of house yesterday and was lifting a lot, a little more sore today because of that. Discontinued strengthening exercises at home and added open books, feels like this exercise helps as he gets a good stretch.       Objective: See treatment diary below    (-) slump B at beginning of treatment       Assessment: Tolerated treatment well. Patient able to centralize symptoms with repeated extensions with ptOP though began session with negative slump B. Continued with rotation with open books to improve lumbar mobility. Demonstrating improvements and encouraged to continue with exercises at home maintaining proper spinal position to decrease neural tension. Patient agreeable to check back in a week to determine ability to manage symptoms. Patient would benefit from continued PT to address remaining deficits.       Plan: Progress treatment as tolerated.  Follow up in a week to determine ability to manage symptoms with HEP.      Precautions: HTN      Manuals  3 3/    CPA lumbar spine  CS     CS  CS    PA R hip mob  CS with ER CS with ER          SL right hip flexor stretch   CS CS CS CS        R hip lateral distraction    CS         Psoas STM      CS       SLing lumbar gapping        CS     Neuro Re-Ed             TrA cx  10x5\" r          Bridge  X10  2x5 " "otb abd  2x10 gtb abd         Clamshell             SLR             Paloff press              Squat    X10          Banded hip flexor on pball    Gtb x10 R Rtb x20         Ther Ex             PPU HEP w/ exhale 2X10 w/ exhale 2x10 w/ exhale  CS 35'  CS 25' CS 30' CS 25' CS 30'    Standing lumbar ext  x10           RB - activity tolerance  5' upright L3 TM 5' TM 5' Upright 5' Upright 5' Upright 5' Upright 5' Upright 5'    Pt education CS CS CS CS CS CS CS CS CS    Prone quad stretch  5x10\" with knee propper            Half kneel hip flexor stretch   10x5\" R 10x5\" R         Prone hip flex R   10x5\"           Supine hip flexor stretch             Seated sciatic nerve flossing      2x20 20x  20x     Open book         20x B knee straight    Ther Activity             Squat with straight back         X10                 Gait Training                                       Modalities                                                    "

## 2024-03-08 ENCOUNTER — APPOINTMENT (OUTPATIENT)
Dept: PHYSICAL THERAPY | Facility: CLINIC | Age: 64
End: 2024-03-08
Payer: COMMERCIAL

## 2024-03-12 ENCOUNTER — OFFICE VISIT (OUTPATIENT)
Dept: PHYSICAL THERAPY | Facility: CLINIC | Age: 64
End: 2024-03-12
Payer: COMMERCIAL

## 2024-03-12 ENCOUNTER — OFFICE VISIT (OUTPATIENT)
Dept: INTERNAL MEDICINE CLINIC | Facility: CLINIC | Age: 64
End: 2024-03-12
Payer: COMMERCIAL

## 2024-03-12 VITALS
OXYGEN SATURATION: 97 % | HEIGHT: 75 IN | DIASTOLIC BLOOD PRESSURE: 74 MMHG | RESPIRATION RATE: 16 BRPM | WEIGHT: 205 LBS | BODY MASS INDEX: 25.49 KG/M2 | SYSTOLIC BLOOD PRESSURE: 126 MMHG | HEART RATE: 88 BPM

## 2024-03-12 DIAGNOSIS — M70.61 TROCHANTERIC BURSITIS OF RIGHT HIP: ICD-10-CM

## 2024-03-12 DIAGNOSIS — S70.251A: ICD-10-CM

## 2024-03-12 DIAGNOSIS — I10 ESSENTIAL HYPERTENSION: Primary | ICD-10-CM

## 2024-03-12 DIAGNOSIS — M25.551 RIGHT HIP PAIN: Primary | ICD-10-CM

## 2024-03-12 DIAGNOSIS — M54.50 MIDLINE LOW BACK PAIN, UNSPECIFIED CHRONICITY, UNSPECIFIED WHETHER SCIATICA PRESENT: ICD-10-CM

## 2024-03-12 DIAGNOSIS — M25.559 HIP PAIN, UNSPECIFIED LATERALITY: ICD-10-CM

## 2024-03-12 PROCEDURE — 99213 OFFICE O/P EST LOW 20 MIN: CPT | Performed by: INTERNAL MEDICINE

## 2024-03-12 PROCEDURE — 97110 THERAPEUTIC EXERCISES: CPT

## 2024-03-12 RX ORDER — LISINOPRIL 30 MG/1
30 TABLET ORAL DAILY
Qty: 90 TABLET | Refills: 3 | Status: SHIPPED | OUTPATIENT
Start: 2024-03-12

## 2024-03-12 NOTE — ASSESSMENT & PLAN NOTE
Blood pressure assessment indicates good control recommend continuation of current hypertensive medication lisinopril at 30 mg daily

## 2024-03-12 NOTE — PROGRESS NOTES
"Daily Note     Today's date: 3/12/2024  Patient name: Jules Keith  : 1960  MRN: [id number]  Referring provider: Edu Slaughter, *  Dx:   Encounter Diagnosis     ICD-10-CM    1. Right hip pain  M25.551       2. Hip pain, unspecified laterality  M25.559       3. Trochanteric bursitis of right hip  M70.61       4. Midline low back pain, unspecified chronicity, unspecified whether sciatica present  M54.50                          Subjective: Patient reports feeling like he's unable to manage symptoms       Objective: See treatment diary below    (+) slump B at beginning of treatment       Assessment: Tolerated treatment well. Patient demonstrated (+) slump at beginning of treatment that was resolved following repeated motions. Addition of right side glides at wall with improvement in slump. Performed prone press up with belt OP and encouraged patient to perform at home for symptom management. Patient would benefit from continued PT to address remaining deficits.       Plan: Progress treatment as tolerated.  Follow up in a week to determine ability to manage symptoms with HEP.      Precautions: HTN      Manuals 1/30 2/2 2/6 2/13 2/20 2/23 2/27 3/1 3/5 3/12   CPA lumbar spine  CS     CS  CS    PA R hip mob  CS with ER CS with ER          SL right hip flexor stretch   CS CS CS CS        R hip lateral distraction    CS         Psoas STM      CS       SLing lumbar gapping        CS     Neuro Re-Ed             TrA cx  10x5\" r          Bridge  X10  2x5 otb abd  2x10 gtb abd         Clamshell             SLR             Paloff press              Squat    X10          Banded hip flexor on pball    Gtb x10 R Rtb x20         Ther Ex             PPU HEP w/ exhale 2X10 w/ exhale 2x10 w/ exhale  CS 35'  CS 25' CS 30' CS 25' CS 30' CS 30'   Right SG at wall          4x10   Standing lumbar ext  x10           RB - activity tolerance  5' upright L3 TM 5' TM 5' Upright 5' Upright 5' Upright 5' Upright 5' Upright 5' " "Upright 5'   Pt education CS CS CS CS CS CS CS CS CS CS   Prone quad stretch  5x10\" with knee propper            Half kneel hip flexor stretch   10x5\" R 10x5\" R         Prone hip flex R   10x5\"           Supine hip flexor stretch             Seated sciatic nerve flossing      2x20 20x  20x     Open book         20x B knee straight    Ther Activity             Squat with straight back         X10                 Gait Training                                       Modalities                                                    "

## 2024-03-12 NOTE — PROGRESS NOTES
Name: Jules Keith      : 1960      MRN: [id number]  Encounter Provider: Jeff Mcgrath MD  Encounter Date: 3/12/2024   Encounter department: [facility] INTERNAL MEDICINE    Assessment & Plan     1. Essential hypertension  Assessment & Plan:  Blood pressure assessment indicates good control recommend continuation of current hypertensive medication lisinopril at 30 mg daily    Orders:  -     lisinopril (ZESTRIL) 30 mg tablet; Take 1 tablet (30 mg total) by mouth daily    2. Foreign body of right hip, initial encounter  Assessment & Plan:  Pelvic and right hip x-rays reviewed there is a small metallic appearing foreign body located lateral to the right hip exact etiology of this metallic object is not certain patient is not aware of any penetrating wounds or any surgery in this area             Subjective      This 63-year-old gentleman returns to our office today for follow-up assessment of his hypertension indicates that blood pressures at home have been running in good range he denies any complaints today.      Review of Systems   Musculoskeletal:         Ongoing right hip and upper leg discomfort   All other systems reviewed and are negative.      Current Outpatient Medications on File Prior to Visit   Medication Sig    aspirin (ECOTRIN) 325 mg EC tablet Take 325 mg by mouth every 6 (six) hours as needed for mild pain    Black Pepper-Turmeric (Turmeric Complex/Black Pepper) 3-500 MG CAPS     Capsicum-Garlic (CAYENNE PLUS GARLIC PO)     cholecalciferol (VITAMIN D3) 400 units tablet Take 2,000 Units by mouth daily    Flowflex COVID-19 Ag Home Test KIT     ibuprofen (MOTRIN) 200 mg tablet Take by mouth every 6 (six) hours as needed for mild pain    Krill Oil 1000 MG CAPS Take by mouth    loratadine (CLARITIN) 10 mg tablet Take 10 mg by mouth if needed for allergies    Misc Natural Products (GLUCOS-CHONDROIT-MSM COMPLEX) TABS     montelukast (SINGULAIR) 10 mg tablet Take 1 tablet (10 mg  "total) by mouth daily at bedtime    Multiple Vitamins-Minerals (MENS MULTIPLUS PO) Take by mouth    QUERCETIN PO     rosuvastatin (CRESTOR) 5 mg tablet take 1 tablet by mouth daily    tadalafil (CIALIS) 20 MG tablet take 1/2 tablet by mouth once daily if needed for ERECTILE DYSFUNCTION    [DISCONTINUED] lisinopril (ZESTRIL) 30 mg tablet Take 1 tablet (30 mg total) by mouth daily       Objective     /74   Pulse 88   Resp 16   Ht 6' 3\" (1.905 m)   Wt 93 kg (205 lb)   SpO2 97%   BMI 25.62 kg/m²     Physical Exam  Constitutional:       General: He is not in acute distress.     Appearance: Normal appearance. He is not ill-appearing.   HENT:      Head: Normocephalic.   Eyes:      Pupils: Pupils are equal, round, and reactive to light.   Cardiovascular:      Rate and Rhythm: Regular rhythm.      Heart sounds: Normal heart sounds.   Pulmonary:      Breath sounds: No wheezing, rhonchi or rales.   Musculoskeletal:      Right lower leg: No edema.      Left lower leg: No edema.   Neurological:      Mental Status: He is alert. Mental status is at baseline.   Psychiatric:         Mood and Affect: Mood normal.         Behavior: Behavior normal.         Thought Content: Thought content normal.         Judgment: Judgment normal.       Jeff Mcgrath MD    "

## 2024-03-12 NOTE — ASSESSMENT & PLAN NOTE
Pelvic and right hip x-rays reviewed there is a small metallic appearing foreign body located lateral to the right hip exact etiology of this metallic object is not certain patient is not aware of any penetrating wounds or any surgery in this area

## 2024-03-19 ENCOUNTER — OFFICE VISIT (OUTPATIENT)
Dept: PHYSICAL THERAPY | Facility: CLINIC | Age: 64
End: 2024-03-19
Payer: COMMERCIAL

## 2024-03-19 DIAGNOSIS — M25.559 HIP PAIN, UNSPECIFIED LATERALITY: ICD-10-CM

## 2024-03-19 DIAGNOSIS — M25.551 RIGHT HIP PAIN: Primary | ICD-10-CM

## 2024-03-19 DIAGNOSIS — M70.61 TROCHANTERIC BURSITIS OF RIGHT HIP: ICD-10-CM

## 2024-03-19 DIAGNOSIS — M54.50 MIDLINE LOW BACK PAIN, UNSPECIFIED CHRONICITY, UNSPECIFIED WHETHER SCIATICA PRESENT: ICD-10-CM

## 2024-03-19 PROCEDURE — 97530 THERAPEUTIC ACTIVITIES: CPT

## 2024-03-19 PROCEDURE — 97110 THERAPEUTIC EXERCISES: CPT

## 2024-03-19 NOTE — PROGRESS NOTES
"Daily Note     Today's date: 3/19/2024  Patient name: Jules Keith  : 1960  MRN: [id number]  Referring provider: Edu Slaughter, *  Dx:   Encounter Diagnosis     ICD-10-CM    1. Right hip pain  M25.551       2. Hip pain, unspecified laterality  M25.559       3. Trochanteric bursitis of right hip  M70.61       4. Midline low back pain, unspecified chronicity, unspecified whether sciatica present  M54.50                            Subjective: Had a really good day on Wednesday after being in therapy and had acupuncture the previous day. Still having some discomfort in the morning and when trying to do things like get on ground to change the oil in his car or painting the house.       Objective: See treatment diary below    (+) slump B at beginning of treatment       Assessment: Tolerated treatment well. Patient demonstrated (-) slump at beginning of treatment. Symptoms were in lateral/posterior right hip that resolved with repeated lumbar extension with patient OP. Educated patient on proper squatting and lifting technique, emphasizing hip hinge to maintain lordosis to avoid tensioning nerve. Updated HEP to reflect these changes. Patient would benefit from continued PT to address remaining deficits.       Plan: Progress treatment as tolerated.  Follow up in a week to determine ability to manage symptoms with HEP.      Precautions: HTN      Manuals 1/30 2/2 2/6 2/13 2/20 2/23 2/27 3/1 3/5 3/12 3/19   CPA lumbar spine  CS     CS  CS     PA R hip mob  CS with ER CS with ER           SL right hip flexor stretch   CS CS CS CS         R hip lateral distraction    CS          Psoas STM      CS        SLing lumbar gapping        CS      Neuro Re-Ed              TrA cx  10x5\" r           Bridge  X10  2x5 otb abd  2x10 gtb abd          Clamshell              SLR              Paloff press               Squat    X10           Banded hip flexor on pball    Gtb x10 R Rtb x20          Ther Ex              PPU HEP w/ " "exhale 2X10 w/ exhale 2x10 w/ exhale  CS 35'  CS 25' CS 30' CS 25' CS 30' CS 30' CS 30'   Right SG at wall          4x10    Standing lumbar ext  x10            RB - activity tolerance  5' upright L3 TM 5' TM 5' Upright 5' Upright 5' Upright 5' Upright 5' Upright 5' Upright 5' Upright 5'   Pt education CS CS CS CS CS CS CS CS CS CS CS   Prone quad stretch  5x10\" with knee propper             Half kneel hip flexor stretch   10x5\" R 10x5\" R          Prone hip flex R   10x5\"            Supine hip flexor stretch              Seated sciatic nerve flossing      2x20 20x  20x      Open book         20x B knee straight     Ther Activity              Squat with straight back         X10  2x10   Hip hinge at wall           2x10   Gait Training                                          Modalities                                                       "

## 2024-03-22 ENCOUNTER — APPOINTMENT (OUTPATIENT)
Dept: PHYSICAL THERAPY | Facility: CLINIC | Age: 64
End: 2024-03-22
Payer: COMMERCIAL

## 2024-03-26 ENCOUNTER — OFFICE VISIT (OUTPATIENT)
Dept: PHYSICAL THERAPY | Facility: CLINIC | Age: 64
End: 2024-03-26
Payer: COMMERCIAL

## 2024-03-26 DIAGNOSIS — M70.61 TROCHANTERIC BURSITIS OF RIGHT HIP: ICD-10-CM

## 2024-03-26 DIAGNOSIS — M54.50 MIDLINE LOW BACK PAIN, UNSPECIFIED CHRONICITY, UNSPECIFIED WHETHER SCIATICA PRESENT: ICD-10-CM

## 2024-03-26 DIAGNOSIS — M25.551 RIGHT HIP PAIN: Primary | ICD-10-CM

## 2024-03-26 DIAGNOSIS — M25.559 HIP PAIN, UNSPECIFIED LATERALITY: ICD-10-CM

## 2024-03-26 PROCEDURE — 97110 THERAPEUTIC EXERCISES: CPT

## 2024-03-26 NOTE — PROGRESS NOTES
"PT Re-Evaluation    Today's date: 3/26/2024  Patient name: Jules Keith  : 1960  MRN: [id number]  Referring provider: Edu Slaughter, *  Dx:   Encounter Diagnosis     ICD-10-CM    1. Right hip pain  M25.551       2. Hip pain, unspecified laterality  M25.559       3. Trochanteric bursitis of right hip  M70.61       4. Midline low back pain, unspecified chronicity, unspecified whether sciatica present  M54.50                              Subjective: Patient reports \"same old\", no perfect days. Patient reports noting huge improvement in the beginning of treatment but feels he's been pretty stagnant the last 3-4 weeks. He notes most difficulty with twisting or bending such as working under car. Says he's improved with getting in/out of car. Patient expresses concern about opaque finding in recent xray and wants to get evaluated further to determine what it is.       Objective: See treatment diary below    (+) slump B at beginning of treatment - improved following repeated motions       Assessment:   Assessment details: Problem List:  1) decreased lumbar mobility into flex with active motion - addressing with repeated extension to end range, with patient OP and therapist OP  2) decreased LE/core strength - addressing with TrA contraction, SLR, clamshells, bridges, progressing to standing activities     Jules Keith is a pleasant 63 y.o. male who presents with complaints of right hip pain with transitions from sitting<> standing.  He has decreased lumbar mobility, tightness in hamstrings, and decreased LE strength resulting in hesitancy to participate in recreational activities related to mechanics.  No further referral appears necessary at this time based upon examination results.  I expect he will benefit from physical therapy.        Comparable signs:  1) standing lumbar forward flex - improved  2) getting in car - improved, compensates  3) leaning back while seated - improved  4) transitioning " "movements, sitting<>standing, rolling in bed/mat - improved, able to control with HEP      Goals  Short Term Goals: to be achieved by 4 weeks  1) Patient to be independent with basic HEP.  2) Decrease pain to 2/10 at its worst.  3) Increase lumbar spine ROM by 10% in all deficient planes.   4) Increase LE strength by 1/2 MMT grade in all deficient planes.    Long Term Goals: to be achieved by discharge  1) FOTO equal to or greater than 79.  2) Patient to be independent with comprehensive HEP.  3) Lumbar spine ROM WNL all planes to improve a/iadls.  4) Increase LE strength to 5/5 MMT grade in all planes to improve a/iadls.  5) Increase ability to perform transitional movements without pain.  6) Patient to perform mechanical hobbies without pain.  7) Patient to walk 4+ miles/day without pain.     Tolerated treatment well. Patient continues to demonstrate ability to manage symptoms with prone press ups. Patient demonstrates improvements in functional activity. Patient agreeable to follow up in a month and will try to see physician in the meantime to get more information about radiographic findings. Patient would benefit from continued PT to address remaining deficits.       Plan: Progress treatment as tolerated.  Follow up in a month to assess ability to control symptoms.     Frequency: 1x week  Duration in visits: 3  Duration in weeks:   Plan of Care beginning date: 3/26/2024  Plan of Care expiration date: 5/14/2024  Treatment plan discussed with: patient     Precautions: HTN      Manuals 1/30 2/2 2/6 2/13 2/20 2/23 2/27 3/1 3/5 3/12 3/19   CPA lumbar spine  CS     CS  CS     PA R hip mob  CS with ER CS with ER           SL right hip flexor stretch   CS CS CS CS         R hip lateral distraction    CS          Psoas STM      CS        SLing lumbar gapping        CS      Neuro Re-Ed              TrA cx  10x5\" r           Bridge  X10  2x5 otb abd  2x10 gtb abd          Clamshell              SLR              Paloff " "press               Squat    X10           Banded hip flexor on pball    Gtb x10 R Rtb x20          Ther Ex              PPU HEP w/ exhale 2X10 w/ exhale 2x10 w/ exhale  CS 35'  CS 25' CS 30' CS 25' CS 30' CS 30' CS 30'   Right SG at wall          4x10    Standing lumbar ext  x10            RB - activity tolerance  5' upright L3 TM 5' TM 5' Upright 5' Upright 5' Upright 5' Upright 5' Upright 5' Upright 5' Upright 5'   Pt education CS CS CS CS CS CS CS CS CS CS CS   Prone quad stretch  5x10\" with knee propper             Half kneel hip flexor stretch   10x5\" R 10x5\" R          Prone hip flex R   10x5\"            Supine hip flexor stretch              Seated sciatic nerve flossing      2x20 20x  20x      Open book         20x B knee straight     Ther Activity              Squat with straight back         X10  2x10   Hip hinge at wall           2x10   Gait Training                                          Modalities                                                       "

## 2024-03-29 ENCOUNTER — APPOINTMENT (OUTPATIENT)
Dept: PHYSICAL THERAPY | Facility: CLINIC | Age: 64
End: 2024-03-29
Payer: COMMERCIAL

## 2024-04-02 ENCOUNTER — APPOINTMENT (OUTPATIENT)
Dept: PHYSICAL THERAPY | Facility: CLINIC | Age: 64
End: 2024-04-02
Payer: COMMERCIAL

## 2024-05-07 ENCOUNTER — EVALUATION (OUTPATIENT)
Dept: PHYSICAL THERAPY | Facility: CLINIC | Age: 64
End: 2024-05-07
Payer: COMMERCIAL

## 2024-05-07 DIAGNOSIS — M79.604 RIGHT LEG PAIN: Primary | ICD-10-CM

## 2024-05-07 PROCEDURE — 97110 THERAPEUTIC EXERCISES: CPT

## 2024-05-07 PROCEDURE — 97140 MANUAL THERAPY 1/> REGIONS: CPT

## 2024-05-07 NOTE — PROGRESS NOTES
"PT Re-Evaluation    Today's date: 2024  Patient name: Jules Keith  : 1960  MRN: [id number]  Referring provider: Edu Slaughter, *  Dx:   Encounter Diagnosis     ICD-10-CM    1. Right leg pain  M79.604                                Subjective: Patient reports he feels about the same \"maybe just a little bit better\". Still having pain in right buttock but less intense than previously.       Objective: See treatment diary below      Assessment:   Assessment details: Problem List:  1) decreased lumbar mobility into flex with active motion - addressing with repeated extension to end range, with patient OP and therapist OP  2) decreased LE/core strength - addressing with TrA contraction, SLR, clamshells, bridges, progressing to standing activities     Jules Keith is a pleasant 63 y.o. male who presents with complaints of right hip pain with transitions from sitting<> standing.  He has decreased lumbar mobility, tightness in hamstrings, and decreased LE strength resulting in hesitancy to participate in recreational activities related to mechanics.  No further referral appears necessary at this time based upon examination results.  I expect he will benefit from physical therapy.        Comparable signs:  1) standing lumbar forward flex - improved  2) getting in car - improved,  3) leaning back while seated - improved  4) transitioning movements, sitting<>standing, rolling in bed/mat - improved, able to control with HEP      Goals  Short Term Goals: to be achieved by 4 weeks  1) Patient to be independent with basic HEP.  2) Decrease pain to 2/10 at its worst.  3) Increase lumbar spine ROM by 10% in all deficient planes.   4) Increase LE strength by 1/2 MMT grade in all deficient planes.    Long Term Goals: to be achieved by discharge  1) FOTO equal to or greater than 79.  2) Patient to be independent with comprehensive HEP.  3) Lumbar spine ROM WNL all planes to improve a/iadls.  4) Increase LE " "strength to 5/5 MMT grade in all planes to improve a/iadls.  5) Increase ability to perform transitional movements without pain.  6) Patient to perform mechanical hobbies without pain.  7) Patient to walk 4+ miles/day without pain.     Tolerated treatment well. Patient continues to demonstrate ability to manage symptoms with prone press ups. Attempted forward flexion with mild symptoms noted suggestive of ANR on right. Updated HEP and will assess effectiveness at next session. Patient would benefit from continued PT to address remaining deficits.       Plan: Progress treatment as tolerated.  Follow up in a week.    Frequency: 1x week  Duration in visits: 3  Duration in weeks:   Plan of Care beginning date: 3/26/2024  Plan of Care expiration date: 5/14/2024  Treatment plan discussed with: patient     Precautions: HTN      Manuals 1/30 2/2 2/6 2/13 2/20 2/23 2/27 3/1 3/5 3/12 3/19 5/7   CPA lumbar spine  CS     CS  CS      PA R hip mob  CS with ER CS with ER            SL right hip flexor stretch   CS CS CS CS          R hip lateral distraction    CS           Psoas STM      CS         SLing lumbar gapping        CS    With nerve tension left sidelying CS   Neuro Re-Ed               TrA cx  10x5\" r            Bridge  X10  2x5 otb abd  2x10 gtb abd           Clamshell               SLR               Paloff press                Squat    X10            Banded hip flexor on pball    Gtb x10 R Rtb x20           Ther Ex               PPU HEP w/ exhale 2X10 w/ exhale 2x10 w/ exhale  CS 35'  CS 25' CS 30' CS 25' CS 30' CS 30' CS 30'    Right SG at wall          4x10     Standing lumbar ext  x10             RB - activity tolerance  5' upright L3 TM 5' TM 5' Upright 5' Upright 5' Upright 5' Upright 5' Upright 5' Upright 5' Upright 5' Upright 5'   Pt education CS CS CS CS CS CS CS CS CS CS CS CS   Prone quad stretch  5x10\" with knee propper              Half kneel hip flexor stretch   10x5\" R 10x5\" R           Prone hip flex R   " "10x5\"             Supine hip flexor stretch               Seated sciatic nerve flossing      2x20 20x  20x    Tensioner in seated 2x10   Open book         20x B knee straight      Forward flexion toes raised            2x10   Ther Activity               Squat with straight back         X10  2x10    Hip hinge at wall           2x10    Gait Training                                             Modalities                                                          "

## 2024-05-14 ENCOUNTER — OFFICE VISIT (OUTPATIENT)
Dept: PHYSICAL THERAPY | Facility: CLINIC | Age: 64
End: 2024-05-14
Payer: COMMERCIAL

## 2024-05-14 DIAGNOSIS — M79.604 RIGHT LEG PAIN: Primary | ICD-10-CM

## 2024-05-14 PROCEDURE — 97110 THERAPEUTIC EXERCISES: CPT

## 2024-05-14 NOTE — PROGRESS NOTES
Daily Note    Today's date: 2024  Patient name: Jules Keith  : 1960  MRN: [id number]  Referring provider: Edu Slaughter, *  Dx:   Encounter Diagnosis     ICD-10-CM    1. Right leg pain  M79.604                                  Subjective: Patient reports it was a bad week, was sitting a lot at graduation, grading, and driving. Got to the point that PPU wasn't resolving symptoms as quick as they had been before.       Objective: See treatment diary below      Assessment:   Assessment details: Problem List:  1) decreased lumbar mobility into flex with active motion - addressing with repeated extension to end range, with patient OP and therapist OP  2) decreased LE/core strength - addressing with TrA contraction, SLR, clamshells, bridges, progressing to standing activities     Jules Keith is a pleasant 63 y.o. male who presents with complaints of right hip pain with transitions from sitting<> standing.  He has decreased lumbar mobility, tightness in hamstrings, and decreased LE strength resulting in hesitancy to participate in recreational activities related to mechanics.  No further referral appears necessary at this time based upon examination results.  I expect he will benefit from physical therapy.        Comparable signs:  1) standing lumbar forward flex - improved  2) getting in car - improved,  3) leaning back while seated - improved  4) transitioning movements, sitting<>standing, rolling in bed/mat - improved, able to control with HEP      Goals  Short Term Goals: to be achieved by 4 weeks  1) Patient to be independent with basic HEP.  2) Decrease pain to 2/10 at its worst.  3) Increase lumbar spine ROM by 10% in all deficient planes.   4) Increase LE strength by 1/2 MMT grade in all deficient planes.    Long Term Goals: to be achieved by discharge  1) FOTO equal to or greater than 79.  2) Patient to be independent with comprehensive HEP.  3) Lumbar spine ROM WNL all planes to improve  "a/iadls.  4) Increase LE strength to 5/5 MMT grade in all planes to improve a/iadls.  5) Increase ability to perform transitional movements without pain.  6) Patient to perform mechanical hobbies without pain.  7) Patient to walk 4+ miles/day without pain.     Tolerated treatment well. Most benefit from PPU. No worsening of symptoms with nerve tensioners, however, adjusted frequency at home and timing of day to improve symptoms. Most notable discomfort with head movement during slump that reproduces buttock pain. Symptoms felt in buttock with cervical retraction.  Patient would benefit from continued PT to address remaining deficits.       Plan: Progress treatment as tolerated.  Follow up in a week.    Frequency: 1x week  Duration in visits: 3  Duration in weeks:   Plan of Care beginning date: 3/26/2024  Plan of Care expiration date: 5/14/2024  Treatment plan discussed with: patient     Precautions: HTN      Manuals 1/30 2/2 2/6 2/13 2/20 2/23 2/27 3/1 3/5 3/12 3/19 5/7 5/14   CPA lumbar spine  CS     CS  CS       PA R hip mob  CS with ER CS with ER             SL right hip flexor stretch   CS CS CS CS           R hip lateral distraction    CS            Psoas STM      CS          SLing lumbar gapping        CS    With nerve tension left sidelying CS    Neuro Re-Ed                TrA cx  10x5\" r             Bridge  X10  2x5 otb abd  2x10 gtb abd            Clamshell                SLR                Paloff press                 Squat    X10             Banded hip flexor on pball    Gtb x10 R Rtb x20            Ther Ex                PPU HEP w/ exhale 2X10 w/ exhale 2x10 w/ exhale  CS 35'  CS 25' CS 30' CS 25' CS 30' CS 30' CS 30'  CS 30'    Seated thoracic ext             2x10   Seated cerv retr             2x10 w pt OP   Thoracic at klaus              70# x10 wit pt OP    Right SG at wall          4x10      Standing lumbar ext  x10              RB - activity tolerance  5' upright L3 TM 5' TM 5' Upright 5' Upright " "5' Upright 5' Upright 5' Upright 5' Upright 5' Upright 5' Upright 5'    Pt education CS CS CS CS CS CS CS CS CS CS CS CS CS   Prone quad stretch  5x10\" with knee propper               Half kneel hip flexor stretch   10x5\" R 10x5\" R            Prone hip flex R   10x5\"              Supine hip flexor stretch                Seated sciatic nerve flossing      2x20 20x  20x    Tensioner in seated 2x10    Open book         20x B knee straight       Forward flexion toes raised            2x10    Ther Activity                Squat with straight back         X10  2x10     Hip hinge at wall           2x10     Gait Training                                                Modalities                                                             "

## 2024-05-21 ENCOUNTER — OFFICE VISIT (OUTPATIENT)
Dept: PHYSICAL THERAPY | Facility: CLINIC | Age: 64
End: 2024-05-21
Payer: COMMERCIAL

## 2024-05-21 DIAGNOSIS — M79.604 RIGHT LEG PAIN: Primary | ICD-10-CM

## 2024-05-21 PROCEDURE — 97110 THERAPEUTIC EXERCISES: CPT

## 2024-05-21 NOTE — PROGRESS NOTES
Daily Note    Today's date: 2024  Patient name: Jules Keith  : 1960  MRN: [id number]  Referring provider: Edu Slaughter, *  Dx:   Encounter Diagnosis     ICD-10-CM    1. Right leg pain  M79.604                                    Subjective: Patient reports it was a bad week, was sitting a lot at graduation, grading, and driving. Got to the point that PPU wasn't resolving symptoms as quick as they had been before.       Objective: See treatment diary below      Assessment:   Assessment details: Problem List:  1) decreased lumbar mobility into flex with active motion - addressing with repeated extension to end range, with patient OP and therapist OP  2) decreased LE/core strength - addressing with TrA contraction, SLR, clamshells, bridges, progressing to standing activities     Jules Keith is a pleasant 63 y.o. male who presents with complaints of right hip pain with transitions from sitting<> standing.  He has decreased lumbar mobility, tightness in hamstrings, and decreased LE strength resulting in hesitancy to participate in recreational activities related to mechanics.  No further referral appears necessary at this time based upon examination results.  I expect he will benefit from physical therapy.        Comparable signs:  1) standing lumbar forward flex - improved  2) getting in car - improved,  3) leaning back while seated - improved  4) transitioning movements, sitting<>standing, rolling in bed/mat - improved, able to control with HEP      Goals  Short Term Goals: to be achieved by 4 weeks  1) Patient to be independent with basic HEP.  2) Decrease pain to 2/10 at its worst.  3) Increase lumbar spine ROM by 10% in all deficient planes.   4) Increase LE strength by 1/2 MMT grade in all deficient planes.    Long Term Goals: to be achieved by discharge  1) FOTO equal to or greater than 79.  2) Patient to be independent with comprehensive HEP.  3) Lumbar spine ROM WNL all planes to  improve a/iadls.  4) Increase LE strength to 5/5 MMT grade in all planes to improve a/iadls.  5) Increase ability to perform transitional movements without pain.  6) Patient to perform mechanical hobbies without pain.  7) Patient to walk 4+ miles/day without pain.     Tolerated treatment well. Patient demonstrates increased neural tension with head movements. Performed grade V thoracic and CTJ mobilizations with success. Able to improve neural tension symptoms with repeated cervical and thoracic flexion. Educated patient to perform neural tension and follow with PPU to manage symptoms. Attempted cervical retraction with extension with minimal change in symptoms. Patient would benefit from continued PT to address remaining deficits.       Plan: Progress treatment as tolerated.  Follow up in a week.    Frequency: 1x week  Duration in visits: 3  Duration in weeks:   Plan of Care beginning date: 3/26/2024  Plan of Care expiration date: 5/14/2024  Treatment plan discussed with: patient     Precautions: HTN      Manuals 2/23 2/27 3/1 3/5 3/12 3/19 5/7 5/14 5/21   CPA lumbar spine  CS  CS     Gr V thoracic supine and CTJ seated    PA R hip mob            SL right hip flexor stretch             R hip lateral distraction            Psoas STM CS           SLing lumbar gapping   CS    With nerve tension left sidelying CS     Neuro Re-Ed            TrA cx            Bridge            Clamshell            SLR            Paloff press             Squat            Banded hip flexor on pball            Ther Ex            PPU CS 25' CS 30' CS 25' CS 30' CS 30' CS 30'  CS 30'     Seated thoracic ext        2x10    Seated cerv retr        2x10 w pt OP X10  2x5 with ext   Thoracic at klaus         70# x10 wit pt OP     Right SG at wall     4x10       Standing lumbar ext            RB - activity tolerance Upright 5' Upright 5' Upright 5' Upright 5' Upright 5' Upright 5' Upright 5'     Pt education CS CS CS CS CS CS CS CS CS   Prone quad  stretch            Half kneel hip flexor stretch            Prone hip flex R            Supine hip flexor stretch            Seated sciatic nerve flossing 2x20 20x  20x    Tensioner in seated 2x10  Tensioning CS   Open book    20x B knee straight        Forward flexion toes raised       2x10     Ther Activity            Squat with straight back    X10  2x10      Hip hinge at wall      2x10      Gait Training                                    Modalities

## 2024-05-28 ENCOUNTER — APPOINTMENT (OUTPATIENT)
Dept: PHYSICAL THERAPY | Facility: CLINIC | Age: 64
End: 2024-05-28
Payer: COMMERCIAL

## 2024-05-28 DIAGNOSIS — E78.2 MIXED HYPERLIPIDEMIA: ICD-10-CM

## 2024-05-29 ENCOUNTER — OFFICE VISIT (OUTPATIENT)
Dept: PHYSICAL THERAPY | Facility: CLINIC | Age: 64
End: 2024-05-29
Payer: COMMERCIAL

## 2024-05-29 DIAGNOSIS — M79.604 RIGHT LEG PAIN: Primary | ICD-10-CM

## 2024-05-29 PROCEDURE — 97110 THERAPEUTIC EXERCISES: CPT

## 2024-05-29 PROCEDURE — 97140 MANUAL THERAPY 1/> REGIONS: CPT

## 2024-05-29 RX ORDER — ROSUVASTATIN CALCIUM 5 MG/1
TABLET, COATED ORAL
Qty: 90 TABLET | Refills: 0 | Status: SHIPPED | OUTPATIENT
Start: 2024-05-29

## 2024-05-29 NOTE — PROGRESS NOTES
Daily Note    Today's date: 2024  Patient name: Jules Keith  : 1960  MRN: [id number]  Referring provider: Edu Slaughter, *  Dx:   Encounter Diagnosis     ICD-10-CM    1. Right leg pain  M79.604                         Start Time: 915  Stop Time: 1000  Total time in clinic (min): 45 minutes        Subjective: Patient reports he did a lot of driving for back and forth this past week to [place] and [place].  States his R leg hurt to lateral thigh and lower lumbar region into upper glutes when he got up from the drive.   States he felt good after mobs from PT last session. Also had massage to upper thoracic region with good response as well.           Objective: See treatment diary below      Assessment:   Assessment details: Problem List:  1) decreased lumbar mobility into flex with active motion - addressing with repeated extension to end range, with patient OP and therapist OP  2) decreased LE/core strength - addressing with TrA contraction, SLR, clamshells, bridges, progressing to standing activities     Jules Keith is a pleasant 63 y.o. male who presents with complaints of right hip pain with transitions from sitting<> standing.  He has decreased lumbar mobility, tightness in hamstrings, and decreased LE strength resulting in hesitancy to participate in recreational activities related to mechanics.  No further referral appears necessary at this time based upon examination results.  I expect he will benefit from physical therapy.        Comparable signs:  1) standing lumbar forward flex - improved  2) getting in car - improved,  3) leaning back while seated - improved  4) transitioning movements, sitting<>standing, rolling in bed/mat - improved, able to control with HEP      Goals  Short Term Goals: to be achieved by 4 weeks  1) Patient to be independent with basic HEP.  2) Decrease pain to 2/10 at its worst.  3) Increase lumbar spine ROM by 10% in all deficient planes.   4)  Increase LE strength by 1/2 MMT grade in all deficient planes.    Long Term Goals: to be achieved by discharge  1) FOTO equal to or greater than 79.  2) Patient to be independent with comprehensive HEP.  3) Lumbar spine ROM WNL all planes to improve a/iadls.  4) Increase LE strength to 5/5 MMT grade in all planes to improve a/iadls.  5) Increase ability to perform transitional movements without pain.  6) Patient to perform mechanical hobbies without pain.  7) Patient to walk 4+ miles/day without pain.     Tolerated treatment well. Patient responded favorably to STM to address deficits to thoracic and R glute region today.   Continued with seated thoracic ext to address symptoms and performed nerve glides to release tension symptoms to R lumbar/glute region w diminished symptoms noted by end of reps. Patient would benefit from continued PT to address remaining deficits.       Plan: Progress treatment as tolerated.  Follow up in a week.    Frequency: 1x week  Duration in visits: 3  Duration in weeks:   Plan of Care beginning date: 3/26/2024  Plan of Care expiration date: 5/14/2024  Treatment plan discussed with: patient     Precautions: HTN      Manuals 2/23 2/27 3/1 3/5 3/12 3/19 5/7 5/14 5/21 5/29   CPA lumbar spine  CS  CS     Gr V thoracic supine and CTJ seated    STM  To thoracic  And R glute   PA R hip mob             SL right hip flexor stretch              R hip lateral distraction             Psoas STM CS            SLing lumbar gapping   CS    With nerve tension left sidelying CS      Neuro Re-Ed             TrA cx             Bridge             Clamshell             SLR             Paloff press              Squat             Banded hip flexor on pball             Ther Ex             PPU CS 25' CS 30' CS 25' CS 30' CS 30' CS 30'  CS 30'   2x10   Seated thoracic ext        2x10  2x10   Seated cerv retr        2x10 w pt OP X10  2x5 with ext    Thoracic at klaus         70# x10 wit pt OP      Right SG at wall      4x10        Standing lumbar ext             RB - activity tolerance Upright 5' Upright 5' Upright 5' Upright 5' Upright 5' Upright 5' Upright 5'      Pt education CS CS CS CS CS CS CS CS CS    Prone quad stretch             Half kneel hip flexor stretch             Prone hip flex R             Supine hip flexor stretch             Seated sciatic nerve flossing 2x20 20x  20x    Tensioner in seated 2x10  Tensioning CS Tensioner in seated 2x10   Open book    20x B knee straight         Forward flexion toes raised       2x10      Ther Activity             Squat with straight back    X10  2x10    2x10   Hip hinge at wall      2x10    2x10    Gait Training                                       Modalities

## 2024-06-25 ENCOUNTER — OFFICE VISIT (OUTPATIENT)
Dept: PHYSICAL THERAPY | Facility: CLINIC | Age: 64
End: 2024-06-25
Payer: COMMERCIAL

## 2024-06-25 DIAGNOSIS — M79.604 RIGHT LEG PAIN: Primary | ICD-10-CM

## 2024-06-25 PROCEDURE — 97110 THERAPEUTIC EXERCISES: CPT

## 2024-06-25 PROCEDURE — 97112 NEUROMUSCULAR REEDUCATION: CPT

## 2024-06-25 NOTE — PROGRESS NOTES
Discharge Note    Today's date: 2024  Patient name: Jules Keith  : 1960  MRN: [id number]  Referring provider: Edu Slaughter, *  Dx:   Encounter Diagnosis     ICD-10-CM    1. Right leg pain  M79.604                                        Subjective: Patient reports improvement with therapy though has hit a plateau with his improvement. He notes he's able to manage his symptoms with HEP and has started walking 3 miles in the morning and that about a mile in his symptoms resolve and are good the rest of the day. Patient feels he's ready to transition to HEP and be discharged.           Objective: See treatment diary below      Assessment:   Assessment details: Problem List:  1) decreased lumbar mobility into flex with active motion - addressing with repeated extension to end range, with patient OP and therapist OP  2) decreased LE/core strength - addressing with TrA contraction, SLR, clamshells, bridges, progressing to standing activities     Jules Keith is a pleasant 63 y.o. male who presents with complaints of right hip pain with transitions from sitting<> standing.  He has decreased lumbar mobility, tightness in hamstrings, and decreased LE strength resulting in hesitancy to participate in recreational activities related to mechanics.  No further referral appears necessary at this time based upon examination results.  I expect he will benefit from physical therapy.        Comparable signs:  1) standing lumbar forward flex - improved  2) getting in car - improved,  3) leaning back while seated - improved  4) transitioning movements, sitting<>standing, rolling in bed/mat - improved, able to control with HEP      Goals  Short Term Goals: to be achieved by 4 weeks  1) Patient to be independent with basic HEP.  2) Decrease pain to 2/10 at its worst.  3) Increase lumbar spine ROM by 10% in all deficient planes.   4) Increase LE strength by 1/2 MMT grade in all deficient planes.    Long Term  Goals: to be achieved by discharge  1) FOTO equal to or greater than 79.  2) Patient to be independent with comprehensive HEP.  3) Lumbar spine ROM WNL all planes to improve a/iadls.  4) Increase LE strength to 5/5 MMT grade in all planes to improve a/iadls.  5) Increase ability to perform transitional movements without pain.  6) Patient to perform mechanical hobbies without pain.  7) Patient to walk 4+ miles/day without pain.     Patient demonstrates functional improvements with therapy and demonstrates ability to manage symptoms with HEP. He has consistently relieved symptoms with prone press ups, neural tensioners, and walking and no longer is limited by his symptoms. At this time, patient is appropriate and agreeable to discharge. Patient provided with updated HEP and advised to call with any questions/concerns, verbalizes understanding.        Plan: Discharge POC.      Precautions: HTN      Manuals 2/23 2/27 3/1 3/5 3/12 3/19 5/7 5/14 5/21 5/29 6/25   CPA lumbar spine  CS  CS     Gr V thoracic supine and CTJ seated    STM  To thoracic  And R glute    PA R hip mob              SL right hip flexor stretch               R hip lateral distraction              Psoas STM CS             SLing lumbar gapping   CS    With nerve tension left sidelying CS       Neuro Re-Ed              Rows            r   Lat pullover            r   TrA cx              Bridge              Clamshell              SLR              Paloff press               Squat              Banded hip flexor on pball              Ther Ex              PPU CS 25' CS 30' CS 25' CS 30' CS 30' CS 30'  CS 30'   2x10 r   Seated thoracic ext        2x10  2x10 r   Seated cerv retr        2x10 w pt OP X10  2x5 with ext  r   Thoracic at klaus         70# x10 wit pt OP       Right SG at wall     4x10         Standing lumbar ext              RB - activity tolerance Upright 5' Upright 5' Upright 5' Upright 5' Upright 5' Upright 5' Upright 5'       Pt education CS CS CS  CS CS CS CS CS CS  CS   Prone quad stretch              Half kneel hip flexor stretch              Prone hip flex R              Supine hip flexor stretch              Seated sciatic nerve flossing 2x20 20x  20x    Tensioner in seated 2x10  Tensioning CS Tensioner in seated 2x10 r   Open book    20x B knee straight       r   Forward flexion toes raised       2x10       Ther Activity              Squat with straight back    X10  2x10    2x10    Hip hinge at wall      2x10    2x10     Gait Training                                          Modalities

## 2024-08-11 DIAGNOSIS — E78.2 MIXED HYPERLIPIDEMIA: ICD-10-CM

## 2024-08-11 RX ORDER — ROSUVASTATIN CALCIUM 5 MG/1
TABLET, COATED ORAL
Qty: 90 TABLET | Refills: 1 | Status: SHIPPED | OUTPATIENT
Start: 2024-08-11

## 2024-08-23 ENCOUNTER — APPOINTMENT (OUTPATIENT)
Dept: LAB | Facility: CLINIC | Age: 64
End: 2024-08-23
Payer: COMMERCIAL

## 2024-08-23 DIAGNOSIS — R73.09 ABNORMAL GLUCOSE: ICD-10-CM

## 2024-08-23 DIAGNOSIS — I10 ESSENTIAL HYPERTENSION: ICD-10-CM

## 2024-08-23 DIAGNOSIS — Z12.5 SCREENING FOR PROSTATE CANCER: ICD-10-CM

## 2024-08-23 DIAGNOSIS — Z13.0 SCREENING FOR DEFICIENCY ANEMIA: ICD-10-CM

## 2024-08-23 DIAGNOSIS — E78.2 MIXED HYPERLIPIDEMIA: ICD-10-CM

## 2024-08-23 LAB
ALBUMIN SERPL BCG-MCNC: 4.4 G/DL (ref 3.5–5)
ALP SERPL-CCNC: 82 U/L (ref 34–104)
ALT SERPL W P-5'-P-CCNC: 26 U/L (ref 7–52)
ANION GAP SERPL CALCULATED.3IONS-SCNC: 5 MMOL/L (ref 4–13)
AST SERPL W P-5'-P-CCNC: 24 U/L (ref 13–39)
BILIRUB SERPL-MCNC: 0.87 MG/DL (ref 0.2–1)
BILIRUB UR QL STRIP: NEGATIVE
BUN SERPL-MCNC: 8 MG/DL (ref 5–25)
CALCIUM SERPL-MCNC: 9.4 MG/DL (ref 8.4–10.2)
CHLORIDE SERPL-SCNC: 100 MMOL/L (ref 96–108)
CHOLEST SERPL-MCNC: 132 MG/DL
CLARITY UR: CLEAR
CO2 SERPL-SCNC: 34 MMOL/L (ref 21–32)
COLOR UR: YELLOW
CREAT SERPL-MCNC: 1 MG/DL (ref 0.6–1.3)
ERYTHROCYTE [DISTWIDTH] IN BLOOD BY AUTOMATED COUNT: 12.4 % (ref 11.6–15.1)
EST. AVERAGE GLUCOSE BLD GHB EST-MCNC: 134 MG/DL
GFR SERPL CREATININE-BSD FRML MDRD: 78 ML/MIN/1.73SQ M
GLUCOSE P FAST SERPL-MCNC: 107 MG/DL (ref 65–99)
GLUCOSE UR STRIP-MCNC: NEGATIVE MG/DL
HBA1C MFR BLD: 6.3 %
HCT VFR BLD AUTO: 44.5 % (ref 36.5–49.3)
HDLC SERPL-MCNC: 40 MG/DL
HGB BLD-MCNC: 14.6 G/DL (ref 12–17)
HGB UR QL STRIP.AUTO: NEGATIVE
KETONES UR STRIP-MCNC: NEGATIVE MG/DL
LDLC SERPL CALC-MCNC: 59 MG/DL (ref 0–100)
LEUKOCYTE ESTERASE UR QL STRIP: NEGATIVE
MCH RBC QN AUTO: 30.5 PG (ref 26.8–34.3)
MCHC RBC AUTO-ENTMCNC: 32.8 G/DL (ref 31.4–37.4)
MCV RBC AUTO: 93 FL (ref 82–98)
NITRITE UR QL STRIP: NEGATIVE
NONHDLC SERPL-MCNC: 92 MG/DL
PH UR STRIP.AUTO: 6.5 [PH]
PLATELET # BLD AUTO: 262 THOUSANDS/UL (ref 149–390)
PMV BLD AUTO: 11 FL (ref 8.9–12.7)
POTASSIUM SERPL-SCNC: 4 MMOL/L (ref 3.5–5.3)
PROT SERPL-MCNC: 7.3 G/DL (ref 6.4–8.4)
PROT UR STRIP-MCNC: NEGATIVE MG/DL
PSA SERPL-MCNC: 0.87 NG/ML (ref 0–4)
RBC # BLD AUTO: 4.79 MILLION/UL (ref 3.88–5.62)
SODIUM SERPL-SCNC: 139 MMOL/L (ref 135–147)
SP GR UR STRIP.AUTO: 1.01 (ref 1–1.03)
TRIGL SERPL-MCNC: 166 MG/DL
UROBILINOGEN UR STRIP-ACNC: <2 MG/DL
WBC # BLD AUTO: 5.33 THOUSAND/UL (ref 4.31–10.16)

## 2024-08-23 PROCEDURE — 83036 HEMOGLOBIN GLYCOSYLATED A1C: CPT

## 2024-08-23 PROCEDURE — 80061 LIPID PANEL: CPT

## 2024-08-23 PROCEDURE — 80053 COMPREHEN METABOLIC PANEL: CPT

## 2024-08-23 PROCEDURE — 36415 COLL VENOUS BLD VENIPUNCTURE: CPT

## 2024-08-23 PROCEDURE — 85027 COMPLETE CBC AUTOMATED: CPT

## 2024-08-23 PROCEDURE — G0103 PSA SCREENING: HCPCS

## 2024-08-23 PROCEDURE — 81003 URINALYSIS AUTO W/O SCOPE: CPT

## 2024-08-27 ENCOUNTER — OFFICE VISIT (OUTPATIENT)
Dept: INTERNAL MEDICINE CLINIC | Facility: CLINIC | Age: 64
End: 2024-08-27

## 2024-08-27 VITALS
WEIGHT: 206.8 LBS | DIASTOLIC BLOOD PRESSURE: 70 MMHG | HEART RATE: 77 BPM | OXYGEN SATURATION: 98 % | TEMPERATURE: 97.7 F | BODY MASS INDEX: 25.71 KG/M2 | SYSTOLIC BLOOD PRESSURE: 128 MMHG | HEIGHT: 75 IN

## 2024-08-27 DIAGNOSIS — R73.09 ABNORMAL GLUCOSE: ICD-10-CM

## 2024-08-27 DIAGNOSIS — E78.2 MIXED HYPERLIPIDEMIA: ICD-10-CM

## 2024-08-27 DIAGNOSIS — I10 ESSENTIAL HYPERTENSION: Primary | ICD-10-CM

## 2024-08-27 DIAGNOSIS — R80.9 PROTEINURIA, UNSPECIFIED TYPE: ICD-10-CM

## 2024-08-27 DIAGNOSIS — N52.9 ERECTILE DYSFUNCTION, UNSPECIFIED ERECTILE DYSFUNCTION TYPE: ICD-10-CM

## 2024-08-27 DIAGNOSIS — M81.0 AGE-RELATED OSTEOPOROSIS WITHOUT CURRENT PATHOLOGICAL FRACTURE: ICD-10-CM

## 2024-08-27 DIAGNOSIS — G47.30 SLEEP APNEA, UNSPECIFIED TYPE: ICD-10-CM

## 2024-08-27 NOTE — PROGRESS NOTES
Ambulatory Visit  Name: Jules Keith      : 1960      MRN: [id number]  Encounter Provider: Jeff Mcgrath MD  Encounter Date: 2024   Encounter department: [facility] INTERNAL MEDICINE    Assessment & Plan   1. Essential hypertension  Assessment & Plan:  Blood pressure assessment confirms good control of hypertension recommend continuation of lisinopril at 30 mg on a daily basis.  2. Age-related osteoporosis without current pathological fracture  Assessment & Plan:  Patient relates a strong family history of osteoporosis and I have requested a baseline DEXA scan.  Orders:  -     DXA body comp analysis; Future; Expected date: 2024  3. Abnormal glucose  Assessment & Plan:  Review of fasting blood sugar indicates a reading of 107.  Reviewed dietary adjustments exercise and weight loss as modes to address his mild prediabetic blood sugar.  Follow-up in 6 months requested including's apprehensive metabolic profile as well as hemoglobin A1c.  Orders:  -     Comprehensive metabolic panel; Future; Expected date: 2025  -     Hemoglobin A1C; Future; Expected date: 2025  4. Sleep apnea, unspecified type  Assessment & Plan:  Patient complaint CPAP treatment for his sleep apnea recommend continuation of current therapy  5. Erectile dysfunction, unspecified erectile dysfunction type  Assessment & Plan:  He continues to utilize Cialis 20 mg as needed  6. Mixed hyperlipidemia  Assessment & Plan:  Lipid profile reviewed with the patient recommend continuation of low cholesterol diet and rosuvastatin daily  7. Proteinuria, unspecified type  Assessment & Plan:  Previous protein in urine has resolved recommend continuation of blood pressure control         History of Present Illness     This 64-year-old gentleman presents to our office today for his annual complete physical examination and review of his comprehensive blood work.  Indicates that he has been experiencing some intermittent  neck pain which has been chronic.  He has been using Advil twice a day without any stomach irritation side effects.  Notes improvement in his discomfort when he uses uses the Advil.    He has had no recent infection symptoms.  Denies any recent cardiac symptoms of chest pain palpitations or shortness of breath.      Review of Systems   Musculoskeletal:  Positive for neck pain.   All other systems reviewed and are negative.    Past Medical History:   Diagnosis Date    Hypertension      Past Surgical History:   Procedure Laterality Date    CHOLECYSTECTOMY      GALLBLADDER SURGERY      TONSILLECTOMY  1963     Family History   Problem Relation Age of Onset    Hypertension Mother     Osteoporosis Mother     Diabetes Father     Heart disease Father          from heart attack    Hypertension Father             Hypertension Sister     Osteoporosis Sister     Kidney cancer Brother     Hypertension Brother     Other Brother         kidney stones, calcium oxalate    Diabetes Brother     Cancer Maternal Grandfather         Skin Cancer    Cancer Maternal Grandmother          after multiple beginning with ovarian     Social History     Tobacco Use    Smoking status: Never    Smokeless tobacco: Never   Vaping Use    Vaping status: Never Used   Substance and Sexual Activity    Alcohol use: Yes     Alcohol/week: 4.0 standard drinks of alcohol     Types: 4 Glasses of wine per week     Comment: daily    Drug use: Never    Sexual activity: Yes     Partners: Female     Birth control/protection: Post-menopausal     Current Outpatient Medications on File Prior to Visit   Medication Sig    aspirin (ECOTRIN) 325 mg EC tablet Take 325 mg by mouth every 6 (six) hours as needed for mild pain    Black Pepper-Turmeric (Turmeric Complex/Black Pepper) 3-500 MG CAPS     Capsicum-Garlic (CAYENNE PLUS GARLIC PO)     cholecalciferol (VITAMIN D3) 400 units tablet Take 2,000 Units by mouth daily    Flowflex COVID-19 Ag Home Test KIT      "ibuprofen (MOTRIN) 200 mg tablet Take by mouth every 6 (six) hours as needed for mild pain    Krill Oil 1000 MG CAPS Take by mouth    lisinopril (ZESTRIL) 30 mg tablet Take 1 tablet (30 mg total) by mouth daily    loratadine (CLARITIN) 10 mg tablet Take 10 mg by mouth if needed for allergies    Misc Natural Products (GLUCOS-CHONDROIT-MSM COMPLEX) TABS     Multiple Vitamins-Minerals (MENS MULTIPLUS PO) Take by mouth    QUERCETIN PO     rosuvastatin (CRESTOR) 5 mg tablet take 1 tablet by mouth daily    tadalafil (CIALIS) 20 MG tablet take 1/2 tablet by mouth once daily if needed for ERECTILE DYSFUNCTION    [DISCONTINUED] montelukast (SINGULAIR) 10 mg tablet Take 1 tablet (10 mg total) by mouth daily at bedtime (Patient not taking: Reported on 7/15/2024)     Allergies   Allergen Reactions    Penicillins Hives     Immunization History   Administered Date(s) Administered    COVID-19 MODERNA VACC 0.5 ML IM 08/08/2022    COVID-19 Moderna Vac BIVALENT 12 Yr+ IM 0.5 ML 10/04/2022    COVID-19 Moderna mRNA Vaccine 12 Yr+ 50 mcg/0.5 mL (Spikevax) 11/01/2023    COVID-19 PFIZER VACCINE 0.3 ML IM 04/05/2021, 04/05/2021, 04/26/2021, 04/26/2021, 11/12/2021    INFLUENZA 10/29/2021, 12/06/2022    Influenza Quadrivalent, 6-35 Months IM 01/23/2018    Influenza, recombinant, quadrivalent,injectable, preservative free 01/30/2020, 02/05/2021, 12/06/2022    Pneumococcal Conjugate 13-Valent 01/10/2017    Pneumococcal Conjugate Vaccine 20-valent (Pcv20), Polysace 08/25/2023    Tdap 03/08/2022, 03/08/2022    Zoster Vaccine Recombinant 02/13/2020, 07/28/2020     Objective     /70   Pulse 77   Temp 97.7 °F (36.5 °C) (Tympanic)   Ht 6' 3\" (1.905 m)   Wt 93.8 kg (206 lb 12.8 oz)   SpO2 98%   BMI 25.85 kg/m²     Physical Exam  Vitals and nursing note reviewed.   Constitutional:       General: He is not in acute distress.     Appearance: He is well-developed. He is not ill-appearing.   HENT:      Head: Normocephalic and atraumatic.      " Right Ear: Tympanic membrane, ear canal and external ear normal.      Left Ear: Tympanic membrane, ear canal and external ear normal.      Nose: Nose normal.      Mouth/Throat:      Mouth: Mucous membranes are moist.      Pharynx: Oropharynx is clear.   Eyes:      Conjunctiva/sclera: Conjunctivae normal.      Pupils: Pupils are equal, round, and reactive to light.   Neck:      Vascular: No carotid bruit.   Cardiovascular:      Rate and Rhythm: Normal rate and regular rhythm.      Heart sounds: Normal heart sounds. No murmur heard.  Pulmonary:      Effort: Pulmonary effort is normal. No respiratory distress.      Breath sounds: Normal breath sounds. No wheezing, rhonchi or rales.   Abdominal:      General: Abdomen is flat. Bowel sounds are normal. There is no distension.      Palpations: Abdomen is soft. There is no mass.      Tenderness: There is no abdominal tenderness. There is no guarding.      Hernia: There is no hernia in the left inguinal area or right inguinal area.   Genitourinary:     Penis: Normal.       Testes: Normal.      Epididymis:      Right: Normal.      Left: Normal.      Prostate: Normal.      Rectum: Normal.   Musculoskeletal:         General: No swelling.      Cervical back: Normal range of motion and neck supple. No rigidity or tenderness.   Lymphadenopathy:      Cervical: No cervical adenopathy.      Lower Body: No right inguinal adenopathy. No left inguinal adenopathy.   Skin:     General: Skin is warm and dry.      Capillary Refill: Capillary refill takes less than 2 seconds.      Coloration: Skin is not jaundiced or pale.   Neurological:      General: No focal deficit present.      Mental Status: He is alert. Mental status is at baseline.   Psychiatric:         Mood and Affect: Mood normal.         Behavior: Behavior normal.         Thought Content: Thought content normal.         Judgment: Judgment normal.

## 2024-08-27 NOTE — ASSESSMENT & PLAN NOTE
Patient relates a strong family history of osteoporosis and I have requested a baseline DEXA scan.

## 2024-08-27 NOTE — ASSESSMENT & PLAN NOTE
Review of fasting blood sugar indicates a reading of 107.  Reviewed dietary adjustments exercise and weight loss as modes to address his mild prediabetic blood sugar.  Follow-up in 6 months requested including's apprehensive metabolic profile as well as hemoglobin A1c.

## 2024-08-27 NOTE — ASSESSMENT & PLAN NOTE
Lipid profile reviewed with the patient recommend continuation of low cholesterol diet and rosuvastatin daily

## 2024-11-21 ENCOUNTER — HOSPITAL ENCOUNTER (OUTPATIENT)
Dept: BONE DENSITY | Facility: MEDICAL CENTER | Age: 64
Discharge: HOME | End: 2024-11-21
Payer: COMMERCIAL

## 2024-11-21 VITALS — BODY MASS INDEX: 25.61 KG/M2 | HEIGHT: 75 IN | WEIGHT: 206 LBS

## 2024-11-21 DIAGNOSIS — M81.0 AGE-RELATED OSTEOPOROSIS WITHOUT CURRENT PATHOLOGICAL FRACTURE: ICD-10-CM

## 2024-11-21 PROCEDURE — 77080 DXA BONE DENSITY AXIAL: CPT

## 2025-02-18 ENCOUNTER — APPOINTMENT (OUTPATIENT)
Dept: LAB | Facility: CLINIC | Age: 65
End: 2025-02-18
Payer: COMMERCIAL

## 2025-02-18 DIAGNOSIS — R73.09 ABNORMAL GLUCOSE: ICD-10-CM

## 2025-02-18 LAB
ALBUMIN SERPL BCG-MCNC: 4.1 G/DL (ref 3.5–5)
ALP SERPL-CCNC: 71 U/L (ref 34–104)
ALT SERPL W P-5'-P-CCNC: 26 U/L (ref 7–52)
ANION GAP SERPL CALCULATED.3IONS-SCNC: 7 MMOL/L (ref 4–13)
AST SERPL W P-5'-P-CCNC: 25 U/L (ref 13–39)
BILIRUB SERPL-MCNC: 0.82 MG/DL (ref 0.2–1)
BUN SERPL-MCNC: 13 MG/DL (ref 5–25)
CALCIUM SERPL-MCNC: 8.8 MG/DL (ref 8.4–10.2)
CHLORIDE SERPL-SCNC: 100 MMOL/L (ref 96–108)
CO2 SERPL-SCNC: 31 MMOL/L (ref 21–32)
CREAT SERPL-MCNC: 1.01 MG/DL (ref 0.6–1.3)
EST. AVERAGE GLUCOSE BLD GHB EST-MCNC: 131 MG/DL
GFR SERPL CREATININE-BSD FRML MDRD: 77 ML/MIN/1.73SQ M
GLUCOSE P FAST SERPL-MCNC: 129 MG/DL (ref 65–99)
HBA1C MFR BLD: 6.2 %
POTASSIUM SERPL-SCNC: 3.8 MMOL/L (ref 3.5–5.3)
PROT SERPL-MCNC: 6.7 G/DL (ref 6.4–8.4)
SODIUM SERPL-SCNC: 138 MMOL/L (ref 135–147)

## 2025-02-18 PROCEDURE — 36415 COLL VENOUS BLD VENIPUNCTURE: CPT

## 2025-02-18 PROCEDURE — 83036 HEMOGLOBIN GLYCOSYLATED A1C: CPT

## 2025-02-18 PROCEDURE — 80053 COMPREHEN METABOLIC PANEL: CPT

## 2025-02-23 DIAGNOSIS — E78.2 MIXED HYPERLIPIDEMIA: ICD-10-CM

## 2025-02-23 RX ORDER — ROSUVASTATIN CALCIUM 5 MG/1
5 TABLET, COATED ORAL DAILY
Qty: 90 TABLET | Refills: 1 | Status: SHIPPED | OUTPATIENT
Start: 2025-02-23

## 2025-02-25 ENCOUNTER — OFFICE VISIT (OUTPATIENT)
Age: 65
End: 2025-02-25
Payer: COMMERCIAL

## 2025-02-25 VITALS
BODY MASS INDEX: 26.49 KG/M2 | HEART RATE: 80 BPM | TEMPERATURE: 98 F | OXYGEN SATURATION: 97 % | WEIGHT: 213 LBS | SYSTOLIC BLOOD PRESSURE: 134 MMHG | HEIGHT: 75 IN | DIASTOLIC BLOOD PRESSURE: 86 MMHG

## 2025-02-25 DIAGNOSIS — Z01.84 IMMUNITY STATUS TESTING: ICD-10-CM

## 2025-02-25 DIAGNOSIS — I10 ESSENTIAL HYPERTENSION: Primary | ICD-10-CM

## 2025-02-25 DIAGNOSIS — R73.09 ABNORMAL GLUCOSE: ICD-10-CM

## 2025-02-25 DIAGNOSIS — N52.9 ERECTILE DYSFUNCTION, UNSPECIFIED ERECTILE DYSFUNCTION TYPE: ICD-10-CM

## 2025-02-25 DIAGNOSIS — R80.9 PROTEINURIA, UNSPECIFIED TYPE: ICD-10-CM

## 2025-02-25 DIAGNOSIS — Z13.0 SCREENING FOR DEFICIENCY ANEMIA: ICD-10-CM

## 2025-02-25 DIAGNOSIS — M81.0 AGE-RELATED OSTEOPOROSIS WITHOUT CURRENT PATHOLOGICAL FRACTURE: ICD-10-CM

## 2025-02-25 DIAGNOSIS — Z12.5 SCREENING FOR PROSTATE CANCER: ICD-10-CM

## 2025-02-25 DIAGNOSIS — E78.2 MIXED HYPERLIPIDEMIA: ICD-10-CM

## 2025-02-25 PROCEDURE — 99214 OFFICE O/P EST MOD 30 MIN: CPT | Performed by: INTERNAL MEDICINE

## 2025-02-25 RX ORDER — TADALAFIL 20 MG/1
20 TABLET ORAL DAILY PRN
Qty: 6 TABLET | Refills: 6 | Status: SHIPPED | OUTPATIENT
Start: 2025-02-25

## 2025-02-26 NOTE — ASSESSMENT & PLAN NOTE
DEXA scan reviewed with the patient recommend vitamin D 2000 units daily and calcium 100 mg daily

## 2025-02-26 NOTE — ASSESSMENT & PLAN NOTE
Blood pressure is trending upward but he admits to some increased stress today will continue on current medication of lisinopril 30 mg daily follow-up with comprehensive metabolic profile requested for August

## 2025-02-26 NOTE — ASSESSMENT & PLAN NOTE
Sting blood sugar 129 hemoglobin A1c is 6.2% recommend continued caution and consumption of concentrated sugars and carbohydrates regular exercise and weight reduction are all advocated with follow-up testing in August

## 2025-02-26 NOTE — PROGRESS NOTES
Name: Jules Keith      : 1960      MRN: [id number]  Encounter Provider: Jeff Mcgrath MD  Encounter Date: 2025   Encounter department: [facility] INTERNAL MEDICINE    Assessment & Plan  Erectile dysfunction, unspecified erectile dysfunction type    Orders:    tadalafil (CIALIS) 20 MG tablet; Take 1 tablet (20 mg total) by mouth daily as needed for erectile dysfunction    Immunity status testing    Orders:    Measles/Mumps/Rubella Immunity; Future    Essential hypertension  Blood pressure is trending upward but he admits to some increased stress today will continue on current medication of lisinopril 30 mg daily follow-up with comprehensive metabolic profile requested for August         Age-related osteoporosis without current pathological fracture  DEXA scan reviewed with the patient recommend vitamin D 2000 units daily and calcium 100 mg daily         Abnormal glucose  Sting blood sugar 129 hemoglobin A1c is 6.2% recommend continued caution and consumption of concentrated sugars and carbohydrates regular exercise and weight reduction are all advocated with follow-up testing in August         Mixed hyperlipidemia  Continue low-cholesterol diet and rosuvastatin 5 mg daily              History of Present Illness     This 64-year-old gentleman returns to our office today for routine follow-up visit.  He has no complaints on today's visit      Review of Systems   All other systems reviewed and are negative.    Past Medical History:   Diagnosis Date    Hypertension      Past Surgical History:   Procedure Laterality Date    CHOLECYSTECTOMY      GALLBLADDER SURGERY      TONSILLECTOMY  1963     Family History   Problem Relation Age of Onset    Hypertension Mother     Osteoporosis Mother     Diabetes Father     Heart disease Father          from heart attack    Hypertension Father             Hypertension Sister     Osteoporosis Sister     Kidney cancer Brother     Hypertension  Brother     Other Brother         kidney stones, calcium oxalate    Diabetes Brother     Cancer Maternal Grandfather         Skin Cancer    Cancer Maternal Grandmother          after multiple beginning with ovarian     Social History     Tobacco Use    Smoking status: Never    Smokeless tobacco: Never   Vaping Use    Vaping status: Never Used   Substance and Sexual Activity    Alcohol use: Yes     Alcohol/week: 4.0 standard drinks of alcohol     Types: 4 Glasses of wine per week     Comment: daily    Drug use: Never    Sexual activity: Yes     Partners: Female     Birth control/protection: Post-menopausal     Current Outpatient Medications on File Prior to Visit   Medication Sig    Black Pepper-Turmeric (Turmeric Complex/Black Pepper) 3-500 MG CAPS     Capsicum-Garlic (CAYENNE PLUS GARLIC PO)     cholecalciferol (VITAMIN D3) 400 units tablet Take 2,000 Units by mouth daily (Patient taking differently: Take 2,000 Units by mouth every other day)    ibuprofen (MOTRIN) 200 mg tablet Take by mouth every 6 (six) hours as needed for mild pain    lisinopril (ZESTRIL) 30 mg tablet Take 1 tablet (30 mg total) by mouth daily    Misc Natural Products (GLUCOS-CHONDROIT-MSM COMPLEX) TABS     Multiple Vitamins-Minerals (MENS MULTIPLUS PO) Take by mouth    rosuvastatin (CRESTOR) 5 mg tablet take 1 tablet by mouth once daily    [DISCONTINUED] tadalafil (CIALIS) 20 MG tablet take 1/2 tablet by mouth once daily if needed for ERECTILE DYSFUNCTION    QUERCETIN PO     [DISCONTINUED] aspirin (ECOTRIN) 325 mg EC tablet Take 325 mg by mouth every 6 (six) hours as needed for mild pain (Patient not taking: Reported on 2025)    [DISCONTINUED] Flowflex COVID-19 Ag Home Test KIT  (Patient not taking: Reported on 2025)    [DISCONTINUED] Krill Oil 1000 MG CAPS Take by mouth (Patient not taking: Reported on 2025)    [DISCONTINUED] loratadine (CLARITIN) 10 mg tablet Take 10 mg by mouth if needed for allergies (Patient not taking:  "Reported on 2/25/2025)     Allergies   Allergen Reactions    Penicillins Hives     Immunization History   Administered Date(s) Administered    COVID-19 MODERNA VACC 0.5 ML IM 08/08/2022    COVID-19 Moderna Vac BIVALENT 12 Yr+ IM 0.5 ML 10/04/2022    COVID-19 Moderna mRNA Vaccine 12 Yr+ 50 mcg/0.5 mL (Spikevax) 11/01/2023, 10/30/2024    COVID-19 PFIZER VACCINE 0.3 ML IM 04/05/2021, 04/05/2021, 04/26/2021, 04/26/2021, 11/12/2021    INFLUENZA 10/29/2021, 12/06/2022    Influenza Quadrivalent, 6-35 Months IM 01/23/2018    Influenza, recombinant, quadrivalent,injectable, preservative free 01/30/2020, 02/05/2021, 12/06/2022    Pneumococcal Conjugate 13-Valent 01/10/2017    Pneumococcal Conjugate Vaccine 20-valent (Pcv20), Polysace 08/25/2023    Tdap 03/08/2022, 03/08/2022    Zoster Vaccine Recombinant 02/13/2020, 07/28/2020     Objective   /86   Pulse 80   Temp 98 °F (36.7 °C) (Tympanic)   Ht 6' 3\" (1.905 m)   Wt 96.6 kg (213 lb)   SpO2 97%   BMI 26.62 kg/m²     Physical Exam  Vitals and nursing note reviewed.   Constitutional:       General: He is not in acute distress.     Appearance: He is well-developed.   HENT:      Head: Normocephalic and atraumatic.   Eyes:      Conjunctiva/sclera: Conjunctivae normal.   Cardiovascular:      Rate and Rhythm: Normal rate and regular rhythm.      Heart sounds: Normal heart sounds. No murmur heard.  Pulmonary:      Effort: Pulmonary effort is normal. No respiratory distress.      Breath sounds: Normal breath sounds. No wheezing, rhonchi or rales.   Abdominal:      Palpations: Abdomen is soft.      Tenderness: There is no abdominal tenderness.   Musculoskeletal:         General: No swelling.      Cervical back: Neck supple.   Skin:     General: Skin is warm and dry.      Capillary Refill: Capillary refill takes less than 2 seconds.   Neurological:      Mental Status: He is alert.   Psychiatric:         Mood and Affect: Mood normal.         "

## 2025-02-28 ENCOUNTER — APPOINTMENT (OUTPATIENT)
Dept: LAB | Facility: CLINIC | Age: 65
End: 2025-02-28
Payer: COMMERCIAL

## 2025-02-28 DIAGNOSIS — Z01.84 IMMUNITY STATUS TESTING: ICD-10-CM

## 2025-02-28 PROCEDURE — 86735 MUMPS ANTIBODY: CPT

## 2025-02-28 PROCEDURE — 86762 RUBELLA ANTIBODY: CPT

## 2025-02-28 PROCEDURE — 86765 RUBEOLA ANTIBODY: CPT

## 2025-03-01 LAB
MEV IGG SER IA-ACNC: >300 AU/ML
MUV IGG SER IA-ACNC: >300 AU/ML
RUBV IGG SERPL IA-ACNC: 20.2 INDEX

## 2025-03-03 ENCOUNTER — RESULTS FOLLOW-UP (OUTPATIENT)
Age: 65
End: 2025-03-03

## 2025-03-03 DIAGNOSIS — I10 ESSENTIAL HYPERTENSION: ICD-10-CM

## 2025-03-04 RX ORDER — LISINOPRIL 30 MG/1
30 TABLET ORAL DAILY
Qty: 90 TABLET | Refills: 1 | Status: SHIPPED | OUTPATIENT
Start: 2025-03-04

## 2025-05-29 ENCOUNTER — NURSE TRIAGE (OUTPATIENT)
Age: 65
End: 2025-05-29

## 2025-05-29 ENCOUNTER — PATIENT MESSAGE (OUTPATIENT)
Age: 65
End: 2025-05-29

## 2025-05-29 ENCOUNTER — HOSPITAL ENCOUNTER (EMERGENCY)
Facility: HOSPITAL | Age: 65
Discharge: HOME | End: 2025-05-29
Attending: EMERGENCY MEDICINE | Admitting: EMERGENCY MEDICINE
Payer: MEDICARE

## 2025-05-29 VITALS
TEMPERATURE: 97.9 F | OXYGEN SATURATION: 96 % | DIASTOLIC BLOOD PRESSURE: 96 MMHG | RESPIRATION RATE: 18 BRPM | HEART RATE: 68 BPM | BODY MASS INDEX: 26.18 KG/M2 | SYSTOLIC BLOOD PRESSURE: 158 MMHG | WEIGHT: 209.44 LBS

## 2025-05-29 DIAGNOSIS — I16.0 HYPERTENSIVE URGENCY: Primary | ICD-10-CM

## 2025-05-29 LAB
2HR DELTA HS TROPONIN: 0 NG/L
ALBUMIN SERPL BCG-MCNC: 4.9 G/DL (ref 3.5–5)
ALP SERPL-CCNC: 72 U/L (ref 34–104)
ALT SERPL W P-5'-P-CCNC: 25 U/L (ref 7–52)
ANION GAP SERPL CALCULATED.3IONS-SCNC: 6 MMOL/L (ref 4–13)
AST SERPL W P-5'-P-CCNC: 22 U/L (ref 13–39)
BASOPHILS # BLD AUTO: 0.05 THOUSANDS/ÂΜL (ref 0–0.1)
BASOPHILS NFR BLD AUTO: 1 % (ref 0–1)
BILIRUB SERPL-MCNC: 0.68 MG/DL (ref 0.2–1)
BILIRUB UR QL STRIP: NEGATIVE
BUN SERPL-MCNC: 14 MG/DL (ref 5–25)
CALCIUM SERPL-MCNC: 9.9 MG/DL (ref 8.4–10.2)
CARDIAC TROPONIN I PNL SERPL HS: 4 NG/L (ref ?–50)
CARDIAC TROPONIN I PNL SERPL HS: 4 NG/L (ref ?–50)
CHLORIDE SERPL-SCNC: 100 MMOL/L (ref 96–108)
CLARITY UR: CLEAR
CO2 SERPL-SCNC: 33 MMOL/L (ref 21–32)
COLOR UR: COLORLESS
CREAT SERPL-MCNC: 0.97 MG/DL (ref 0.6–1.3)
EOSINOPHIL # BLD AUTO: 0.07 THOUSAND/ÂΜL (ref 0–0.61)
EOSINOPHIL NFR BLD AUTO: 1 % (ref 0–6)
ERYTHROCYTE [DISTWIDTH] IN BLOOD BY AUTOMATED COUNT: 12.4 % (ref 11.6–15.1)
GFR SERPL CREATININE-BSD FRML MDRD: 81 ML/MIN/1.73SQ M
GLUCOSE SERPL-MCNC: 101 MG/DL (ref 65–140)
GLUCOSE UR STRIP-MCNC: NEGATIVE MG/DL
HCT VFR BLD AUTO: 44.9 % (ref 36.5–49.3)
HGB BLD-MCNC: 15.1 G/DL (ref 12–17)
HGB UR QL STRIP.AUTO: NEGATIVE
IMM GRANULOCYTES # BLD AUTO: 0.01 THOUSAND/UL (ref 0–0.2)
IMM GRANULOCYTES NFR BLD AUTO: 0 % (ref 0–2)
KETONES UR STRIP-MCNC: NEGATIVE MG/DL
LEUKOCYTE ESTERASE UR QL STRIP: NEGATIVE
LYMPHOCYTES # BLD AUTO: 2.68 THOUSANDS/ÂΜL (ref 0.6–4.47)
LYMPHOCYTES NFR BLD AUTO: 41 % (ref 14–44)
MCH RBC QN AUTO: 30.8 PG (ref 26.8–34.3)
MCHC RBC AUTO-ENTMCNC: 33.6 G/DL (ref 31.4–37.4)
MCV RBC AUTO: 92 FL (ref 82–98)
MONOCYTES # BLD AUTO: 0.73 THOUSAND/ÂΜL (ref 0.17–1.22)
MONOCYTES NFR BLD AUTO: 11 % (ref 4–12)
NEUTROPHILS # BLD AUTO: 2.96 THOUSANDS/ÂΜL (ref 1.85–7.62)
NEUTS SEG NFR BLD AUTO: 46 % (ref 43–75)
NITRITE UR QL STRIP: NEGATIVE
NRBC BLD AUTO-RTO: 0 /100 WBCS
PH UR STRIP.AUTO: 6.5 [PH]
PLATELET # BLD AUTO: 249 THOUSANDS/UL (ref 149–390)
PMV BLD AUTO: 10.9 FL (ref 8.9–12.7)
POTASSIUM SERPL-SCNC: 3.7 MMOL/L (ref 3.5–5.3)
PROT SERPL-MCNC: 8 G/DL (ref 6.4–8.4)
PROT UR STRIP-MCNC: NEGATIVE MG/DL
RBC # BLD AUTO: 4.9 MILLION/UL (ref 3.88–5.62)
SODIUM SERPL-SCNC: 139 MMOL/L (ref 135–147)
SP GR UR STRIP.AUTO: 1 (ref 1–1.03)
UROBILINOGEN UR STRIP-ACNC: <2 MG/DL
WBC # BLD AUTO: 6.5 THOUSAND/UL (ref 4.31–10.16)

## 2025-05-29 PROCEDURE — 81003 URINALYSIS AUTO W/O SCOPE: CPT

## 2025-05-29 PROCEDURE — 96360 HYDRATION IV INFUSION INIT: CPT

## 2025-05-29 PROCEDURE — 36415 COLL VENOUS BLD VENIPUNCTURE: CPT

## 2025-05-29 PROCEDURE — 80053 COMPREHEN METABOLIC PANEL: CPT

## 2025-05-29 PROCEDURE — 99283 EMERGENCY DEPT VISIT LOW MDM: CPT

## 2025-05-29 PROCEDURE — 93005 ELECTROCARDIOGRAM TRACING: CPT

## 2025-05-29 PROCEDURE — 84484 ASSAY OF TROPONIN QUANT: CPT

## 2025-05-29 PROCEDURE — 85025 COMPLETE CBC W/AUTO DIFF WBC: CPT

## 2025-05-29 PROCEDURE — 99284 EMERGENCY DEPT VISIT MOD MDM: CPT

## 2025-05-29 RX ORDER — HYDRALAZINE HYDROCHLORIDE 25 MG/1
25 TABLET, FILM COATED ORAL ONCE
Status: COMPLETED | OUTPATIENT
Start: 2025-05-29 | End: 2025-05-29

## 2025-05-29 RX ORDER — HYDRALAZINE HYDROCHLORIDE 10 MG/1
10 TABLET, FILM COATED ORAL DAILY
Qty: 20 TABLET | Refills: 0 | Status: SHIPPED | OUTPATIENT
Start: 2025-05-29 | End: 2025-05-30

## 2025-05-29 RX ADMIN — HYDRALAZINE HYDROCHLORIDE 25 MG: 25 TABLET ORAL at 21:53

## 2025-05-29 RX ADMIN — SODIUM CHLORIDE 1000 ML: 0.9 INJECTION, SOLUTION INTRAVENOUS at 19:28

## 2025-05-29 NOTE — TELEPHONE ENCOUNTER
Regarding: Hypertension  ----- Message from Leta CLARKE sent at 5/29/2025  5:19 PM EDT -----  Mycjamt converted to triage      Dr Mcgrath,     When I was into the office in February, my blood pressure was high. I started tracking it at home. It never really got into a good range. Since the beginning of May, it has been tracking around 145/88. I always take it three times with an interval between. We were away for two weeks and upon return it was 173/100, 164/101 and 157/98. I was in [facility] and took it on their machine and it was 166/99.     In the meantime, I have lost about 10 lbs since my February visit. Unfortunately, this has not seemed to help.     Obviously, these numbers are too high. I was wondering if I should increase the Lisinopril from 30 to 40 mg. We are going to [place] for 10 days in early June.     Finally, we switched Pharmacies since [facility] is closing. The new pharmacy is [id number] at [address] [place] [address] ([phone]).      I can come in for a checkup if you want to see me.

## 2025-05-29 NOTE — TELEPHONE ENCOUNTER
"REASON FOR CONVERSATION: Hypertension    SYMPTOMS: Patient with hypertension taken at home 173/100,164/101,157/98 on home machine. Patient then was at [facility] and took BP of 166/99. Patient denies any symptoms. RN asked for patient to take again and BP is reading 192/102. Patient denies symptoms    OTHER HEALTH INFORMATION: On BP medications discussed with Dr. Mcgrath at last appointment.    PROTOCOL DISPOSITION: No disposition on file.    CARE ADVICE PROVIDED: Get to Emergency department. Have wife drive.    PRACTICE FOLLOW-UP: Schedule an appointment before leaving for trip on 6/9/25 to discuss Er follow up                Reason for Disposition   Patient sounds very sick or weak to the triager    Answer Assessment - Initial Assessment Questions  1. BLOOD PRESSURE: \"What is your blood pressure?\" \"Did you take at least two measurements 5 minutes apart?\"      173/100,164/101,157/98   and again 166/99  2. ONSET: \"When did you take your blood pressure?\"      This morning  3. HOW: \"How did you take your blood pressure?\" (e.g., automatic home BP monitor, visiting nurse)      Home and [facility]  4. HISTORY: \"Do you have a history of high blood pressure?\"      yes  5. MEDICINES: \"Are you taking any medicines for blood pressure?\" \"Have you missed any doses recently?\"      denies  6. OTHER SYMPTOMS: \"Do you have any symptoms?\" (e.g., blurred vision, chest pain, difficulty breathing, headache, weakness)      denies    Protocols used: Blood Pressure - High-Adult-[place]    "

## 2025-05-30 ENCOUNTER — OFFICE VISIT (OUTPATIENT)
Age: 65
End: 2025-05-30
Payer: COMMERCIAL

## 2025-05-30 VITALS
HEART RATE: 88 BPM | BODY MASS INDEX: 26.11 KG/M2 | OXYGEN SATURATION: 96 % | RESPIRATION RATE: 16 BRPM | HEIGHT: 75 IN | WEIGHT: 210 LBS | DIASTOLIC BLOOD PRESSURE: 86 MMHG | SYSTOLIC BLOOD PRESSURE: 146 MMHG | TEMPERATURE: 98.6 F

## 2025-05-30 DIAGNOSIS — I10 ESSENTIAL HYPERTENSION: ICD-10-CM

## 2025-05-30 LAB
ATRIAL RATE: 70 BPM
ATRIAL RATE: 72 BPM
P AXIS: 52 DEGREES
P AXIS: 63 DEGREES
PR INTERVAL: 200 MS
PR INTERVAL: 204 MS
QRS AXIS: -15 DEGREES
QRS AXIS: -16 DEGREES
QRSD INTERVAL: 80 MS
QRSD INTERVAL: 80 MS
QT INTERVAL: 392 MS
QT INTERVAL: 398 MS
QTC INTERVAL: 423 MS
QTC INTERVAL: 435 MS
T WAVE AXIS: 31 DEGREES
T WAVE AXIS: 34 DEGREES
VENTRICULAR RATE: 70 BPM
VENTRICULAR RATE: 72 BPM

## 2025-05-30 PROCEDURE — 93010 ELECTROCARDIOGRAM REPORT: CPT | Performed by: INTERNAL MEDICINE

## 2025-05-30 PROCEDURE — 99214 OFFICE O/P EST MOD 30 MIN: CPT | Performed by: INTERNAL MEDICINE

## 2025-05-30 RX ORDER — OMEGA-3S/DHA/EPA/FISH OIL/D3 300MG-1000
2000 CAPSULE ORAL EVERY OTHER DAY
Qty: 100 TABLET | Refills: 3 | Status: SHIPPED | OUTPATIENT
Start: 2025-05-30

## 2025-05-30 RX ORDER — HYDROCHLOROTHIAZIDE 12.5 MG/1
12.5 TABLET ORAL DAILY
Qty: 90 TABLET | Refills: 3 | Status: SHIPPED | OUTPATIENT
Start: 2025-05-30 | End: 2025-06-04

## 2025-05-30 RX ORDER — LISINOPRIL 40 MG/1
40 TABLET ORAL DAILY
Qty: 40 TABLET | Refills: 3 | Status: SHIPPED | OUTPATIENT
Start: 2025-05-30 | End: 2025-06-04 | Stop reason: SDUPTHER

## 2025-05-30 NOTE — ASSESSMENT & PLAN NOTE
Orders:    lisinopril (ZESTRIL) 40 mg tablet; Take 1 tablet (40 mg total) by mouth daily    hydroCHLOROthiazide 12.5 mg tablet; Take 1 tablet (12.5 mg total) by mouth daily

## 2025-05-30 NOTE — DISCHARGE INSTRUCTIONS
Take medication as prescribed  Follow-up with PCP in outpatient setting as previously scheduled  Return to prescribed for increasing fever, body aches, chills, chest pain, palpitations, shortness breath, worsening blurred vision, double vision, headache, flank pain, or blood in the urine or dark-colored urine.

## 2025-05-30 NOTE — ED PROVIDER NOTES
Time reflects when diagnosis was documented in both MDM as applicable and the Disposition within this note       Time User Action Codes Description Comment    5/29/2025 10:39 PM Edu Estrada Add [I16.0] Hypertensive urgency           ED Disposition       ED Disposition   Discharge    Condition   Stable    Date/Time   u May 29, 2025 10:39 PM    Comment   Jules Keith discharge to home/self care.                   Assessment & Plan       Medical Decision Making  Patient is a 65-year-old male past medical history of hypertension presenting to Emergency Department with reports of BP being high throughout the day.  Patient states he took his blood pressure and his blood pressure was in the 190s to 200s over 100-110s.  Patient states that due to his high blood pressure he called his PCP who advised him to come to the emergency department for further evaluation.  Physical exam shows patient is hypertensive in the 200s over 100s, normotensive, normal rate and rhythm.  EKG shows no ischemic changes.  S1-S2 heard without murmurs rubs or gallops, lungs clear auscultation bilaterally.  Neuroexam intact without any focal deficits. Differential diagnosis including but not limited to: hypertension, hypertensive urgency, hypertensive crisis, malignant hypertension, end organ damage, renal failure, metabolic abnormality, intracranial process, ACS, MI; doubt pheochromocytoma.  CBC CMP within normal limits.  Troponin at 0 and 4-hour of 4 with a delta of 0.  No acute changes consistent with ACS.  CBC, CMP, UA, troponin ordered.  All of which were in normal limits.  No signs of endorgan damage.  Findings was consistent with hypertensive urgency.  Patient treated with hydralazine 25 mg tablet p.o. With an improvement of blood pressure to 15 8/96.  Patient safe and stable for discharge at this time.  Patient to follow-up with PCP tomorrow for blood pressure medication management.  Patient provided a few pills of hydralazine if blood  "pressure remains high in the 180s over 100s even after taking his daily lisinopril.  Patient given strict return precautions to return to prescribed for increasing fever, body aches, chills, chest pain, palpitations, shortness breath, worsening blurred vision, double vision, headache, flank pain, or blood in the urine or dark-colored urine.  Patient verbalized understanding and agrees to current plan.  Patient discharged home stable condition this time.      Amount and/or Complexity of Data Reviewed  Labs: ordered.    Risk  Prescription drug management.        Medications   sodium chloride 0.9 % bolus 1,000 mL (0 mL Intravenous Stopped 5/29/25 2054)   hydrALAZINE (APRESOLINE) tablet 25 mg (25 mg Oral Given 5/29/25 2153)       ED Risk Strat Scores                    No data recorded        SBIRT 20yo+      Flowsheet Row Most Recent Value   Initial Alcohol Screen: US AUDIT-C     1. How often do you have a drink containing alcohol? 0 Filed at: 05/29/2025 1832   2. How many drinks containing alcohol do you have on a typical day you are drinking?  0 Filed at: 05/29/2025 1832   3a. Male UNDER 65: How often do you have five or more drinks on one occasion? 0 Filed at: 05/29/2025 1832   3b. FEMALE Any Age, or MALE 65+: How often do you have 4 or more drinks on one occassion? 0 Filed at: 05/29/2025 1832   Audit-C Score 0 Filed at: 05/29/2025 1832   RAMANDEEP: How many times in the past year have you...    Used an illegal drug or used a prescription medication for non-medical reasons? Never Filed at: 05/29/2025 1832                            History of Present Illness       Chief Complaint   Patient presents with    Hypertension     Pt reports his BP has been high for a while. Spoke with his PCP today and was advised to come in for further eval. Pt reports he does not have headache or dizziness but did notice a \"floater\" in his vision this week. States the floater is still there and has not resolved. Follows up with retinal " specialist for eyes.        Past Medical History[1]   Past Surgical History[2]   Family History[3]   Social History[4]   E-Cigarette/Vaping    E-Cigarette Use Never User       E-Cigarette/Vaping Substances    Nicotine No     THC No     CBD No     Flavoring No     Other No     Unknown No       I have reviewed and agree with the history as documented.     Patient is a 65-year-old male past medical history of hypertension presenting to Emergency Department with reports of BP being high throughout the day.  Patient states he took his blood pressure and his blood pressure was in the 190s to 200s over 100-110s.  Patient states that due to his high blood pressure he called his PCP who advised him to come to the emergency department for further evaluation.  Patient reports that he does not have headache or dizziness but did notice a floater in his vision this week.  Patient states that this has been an ongoing complaint for multiple months now.  Patient states floater still there and has not resolved.  Patient states that he follows up with retinal specialty for tomorrow morning for further evaluation of that.  Patient states that they were going to adjust his lisinopril 30 mg if his blood pressure continues to increase.  Patient states that he has been taking his blood pressure to see if he needs to go up on his medication again.  Patient denies any fever, body, chills, blurred vision, double vision, headache, dizziness, chest pain, palpitation, shortness breath, numbness, tingling, headache, dysuria, hematuria, or any  symptoms at this time.      Hypertension  Associated symptoms: no abdominal pain, no chest pain, no ear pain, no fever, no hematuria, no palpitations, no shortness of breath and not vomiting        Review of Systems   Constitutional:  Negative for chills and fever.   HENT:  Negative for ear pain and sore throat.    Eyes:  Negative for photophobia, pain, discharge, redness, itching and visual disturbance.    Respiratory:  Negative for cough and shortness of breath.    Cardiovascular:  Negative for chest pain and palpitations.        Admits to hypertension   Gastrointestinal:  Negative for abdominal pain and vomiting.   Genitourinary:  Negative for dysuria and hematuria.   Musculoskeletal:  Negative for arthralgias and back pain.   Skin:  Negative for color change and rash.   Neurological:  Negative for seizures and syncope.   All other systems reviewed and are negative.          Objective       ED Triage Vitals   Temperature Pulse Blood Pressure Respirations SpO2 Patient Position - Orthostatic VS   05/29/25 1821 05/29/25 1821 05/29/25 1821 05/29/25 1821 05/29/25 1821 05/29/25 1821   97.9 °F (36.6 °C) 70 (!) 196/92 16 100 % Sitting      Temp src Heart Rate Source BP Location FiO2 (%) Pain Score    -- 05/29/25 2030 05/29/25 1821 -- --     Monitor Right arm        Vitals      Date and Time Temp Pulse SpO2 Resp BP Pain Score FACES Pain Rating User   05/29/25 2230 -- 68 96 % 18 158/96 -- -- AS   05/29/25 2130 -- 73 97 % 17 185/101 -- -- AS   05/29/25 2030 -- 72 97 % 15 181/90 -- -- AS   05/29/25 1830 -- -- -- -- 177/103 -- -- AS   05/29/25 1821 97.9 °F (36.6 °C) 70 100 % 16 196/92 -- -- LP            Physical Exam    Results Reviewed       Procedure Component Value Units Date/Time    HS Troponin I 2hr [[id number]]  (Normal) Collected: 05/29/25 2127    Lab Status: Final result Specimen: Blood from Arm, Left Updated: 05/29/25 2201     hs TnI 2hr 4 ng/L      Delta 2hr hsTnI 0 ng/L     HS Troponin 0hr (reflex protocol) [[id number]]  (Normal) Collected: 05/29/25 1928    Lab Status: Final result Specimen: Blood from Arm, Left Updated: 05/29/25 2001     hs TnI 0hr 4 ng/L     Comprehensive metabolic panel [[id number]]  (Abnormal) Collected: 05/29/25 1928    Lab Status: Final result Specimen: Blood from Arm, Left Updated: 05/29/25 1955     Sodium 139 mmol/L      Potassium 3.7 mmol/L      Chloride 100 mmol/L      CO2 33 mmol/L       ANION GAP 6 mmol/L      BUN 14 mg/dL      Creatinine 0.97 mg/dL      Glucose 101 mg/dL      Calcium 9.9 mg/dL      AST 22 U/L      ALT 25 U/L      Alkaline Phosphatase 72 U/L      Total Protein 8.0 g/dL      Albumin 4.9 g/dL      Total Bilirubin 0.68 mg/dL      eGFR 81 ml/min/1.73sq m     Narrative:      National Kid[employer] Foundation guidelines for Chronic Kidney Disease (CKD):     Stage 1 with normal or high GFR (GFR > 90 mL/min/1.73 square meters)    Stage 2 Mild CKD (GFR = 60-89 mL/min/1.73 square meters)    Stage 3A Moderate CKD (GFR = 45-59 mL/min/1.73 square meters)    Stage 3B Moderate CKD (GFR = 30-44 mL/min/1.73 square meters)    Stage 4 Severe CKD (GFR = 15-29 mL/min/1.73 square meters)    Stage 5 End Stage CKD (GFR <15 mL/min/1.73 square meters)  Note: GFR calculation is accurate only with a steady state creatinine    UA w Reflex to Microscopic w Reflex to Culture [[id number]] Collected: 05/29/25 1928    Lab Status: Final result Specimen: Urine, Clean Catch Updated: 05/29/25 1942     Color, UA Colorless     Clarity, UA Clear     Specific Gravity, UA 1.003     pH, UA 6.5     Leukocytes, UA Negative     Nitrite, UA Negative     Protein, UA Negative mg/dl      Glucose, UA Negative mg/dl      Ketones, UA Negative mg/dl      Urobilinogen, UA <2.0 mg/dl      Bilirubin, UA Negative     Occult Blood, UA Negative    CBC and differential [[id number]] Collected: 05/29/25 1928    Lab Status: Final result Specimen: Blood from Arm, Left Updated: 05/29/25 1935     WBC 6.50 Thousand/uL      RBC 4.90 Million/uL      Hemoglobin 15.1 g/dL      Hematocrit 44.9 %      MCV 92 fL      MCH 30.8 pg      MCHC 33.6 g/dL      RDW 12.4 %      MPV 10.9 fL      Platelets 249 Thousands/uL      nRBC 0 /100 WBCs      Segmented % 46 %      Immature Grans % 0 %      Lymphocytes % 41 %      Monocytes % 11 %      Eosinophils Relative 1 %      Basophils Relative 1 %      Absolute Neutrophils 2.96 Thousands/µL      Absolute Immature Grans  0.01 Thousand/uL      Absolute Lymphocytes 2.68 Thousands/µL      Absolute Monocytes 0.73 Thousand/µL      Eosinophils Absolute 0.07 Thousand/µL      Basophils Absolute 0.05 Thousands/µL             No orders to display       Procedures    ED Medication and Procedure Management   Prior to Admission Medications   Prescriptions Last Dose Informant Patient Reported? Taking?   Black Pepper-Turmeric (Turmeric Complex/Black Pepper) 3-500 MG CAPS  Self Yes No   Capsicum-Garlic (CAYENNE PLUS GARLIC PO)  Self Yes No   Misc Natural Products (GLUCOS-CHONDROIT-MSM COMPLEX) TABS  Self Yes No   Multiple Vitamins-Minerals (MENS MULTIPLUS PO)  Self Yes No   Sig: Take by mouth   QUERCETIN PO  Self Yes No   cholecalciferol (VITAMIN D3) 400 units tablet  Self Yes No   Sig: Take 2,000 Units by mouth daily   Patient taking differently: Take 2,000 Units by mouth every other day   ibuprofen (MOTRIN) 200 mg tablet  Self Yes No   Sig: Take by mouth every 6 (six) hours as needed for mild pain   lisinopril (ZESTRIL) 30 mg tablet   No No   Sig: take 1 tablet by mouth once daily   rosuvastatin (CRESTOR) 5 mg tablet   No No   Sig: take 1 tablet by mouth once daily   tadalafil (CIALIS) 20 MG tablet   No No   Sig: Take 1 tablet (20 mg total) by mouth daily as needed for erectile dysfunction      Facility-Administered Medications: None     Discharge Medication List as of 5/29/2025 10:40 PM        START taking these medications    Details   hydrALAZINE (APRESOLINE) 10 mg tablet Take 1 tablet (10 mg total) by mouth in the morning for 10 days, Starting Thu 5/29/2025, Until Sun 6/8/2025, Normal           CONTINUE these medications which have NOT CHANGED    Details   Black Pepper-Turmeric (Turmeric Complex/Black Pepper) 3-500 MG CAPS Starting Sat 7/1/2017, Historical Med      Capsicum-Garlic (CAYENNE PLUS GARLIC PO) Starting Tue 1/1/2013, Historical Med      cholecalciferol (VITAMIN D3) 400 units tablet Take 2,000 Units by mouth daily, Historical Med       ibuprofen (MOTRIN) 200 mg tablet Take by mouth every 6 (six) hours as needed for mild pain, Historical Med      lisinopril (ZESTRIL) 30 mg tablet take 1 tablet by mouth once daily, Starting Tue 3/4/2025, Normal      Misc Natural Products (GLUCOS-CHONDROIT-MSM COMPLEX) TABS Historical Med      Multiple Vitamins-Minerals (MENS MULTIPLUS PO) Take by mouth, Historical Med      QUERCETIN PO Starting 2013, Historical Med      rosuvastatin (CRESTOR) 5 mg tablet take 1 tablet by mouth once daily, Starting Sun 2025, Normal      tadalafil (CIALIS) 20 MG tablet Take 1 tablet (20 mg total) by mouth daily as needed for erectile dysfunction, Starting 2025, Normal           No discharge procedures on file.  ED SEPSIS DOCUMENTATION   Time reflects when diagnosis was documented in both MDM as applicable and the Disposition within this note       Time User Action Codes Description Comment    2025 10:39 PM Edu Estrada Add [I16.0] Hypertensive urgency                    [1]   Past Medical History:  Diagnosis Date    Hypertension    [2]   Past Surgical History:  Procedure Laterality Date    CHOLECYSTECTOMY      GALLBLADDER SURGERY      TONSILLECTOMY     [3]   Family History  Problem Relation Name Age of Onset    Hypertension Mother Aishwarya Keith     Osteoporosis Mother Aishwarya Keith     Diabetes Father Bryan Keith     Heart disease Father Bryan Keith          from heart attack    Hypertension Father Bryan Keith             Hypertension Sister Gloria Keith     Osteoporosis Sister Gloria Keith     Kidney cancer Brother Mark Keith     Hypertension Brother Mark Keith     Other Brother Mark Keith         kidney stones, calcium oxalate    Diabetes Brother Mark Keith     Cancer Maternal Grandfather Feliz Martin         Skin Cancer    Cancer Maternal Grandmother Gayle Martin          after multiple beginning with ovarian   [4]   Social History  Tobacco Use    Smoking  status: Never    Smokeless tobacco: Never   Vaping Use    Vaping status: Never Used   Substance Use Topics    Alcohol use: Yes     Alcohol/week: 4.0 standard drinks of alcohol     Types: 4 Glasses of wine per week     Comment: daily    Drug use: Never        Edu Estrada PA-C  05/30/25 0106

## 2025-05-30 NOTE — TELEPHONE ENCOUNTER
This was an additional encounter made in error.  Reason for Disposition   Negative: Did you page the on call provider?    [facility]

## 2025-05-30 NOTE — PROGRESS NOTES
Name: Jules Keith      : 1960      MRN: [id number]  Encounter Provider: Jeff Mcgrath MD  Encounter Date: 2025   Encounter department: [facility] INTERNAL MEDICINE    Assessment & Plan  Essential hypertension    Orders:    lisinopril (ZESTRIL) 40 mg tablet; Take 1 tablet (40 mg total) by mouth daily    hydroCHLOROthiazide 12.5 mg tablet; Take 1 tablet (12.5 mg total) by mouth daily         History of Present Illness     This pleasant 65-year-old gentleman presents to our office today after a urgent visit to the emergency room yesterday for elevated blood pressure.  Found to have readings in the high 190s with mild pounding sensation in his chest.  Workup blood work which was reviewed today along with electrocardiogram and troponins electrocardiogram showed no acute changes and troponins were negative.  He was placed on a dose of hydralazine last night and returns to our office today for follow-up assessment.  His blood pressure this afternoon is 146/86.  We have recommended an increase in his lisinopril from 30 mg a day to 40 mg daily and I have added  a dose of hydrochlorothiazide 12-1/2 mg daily.  I will see him next week in the office for follow-up assessment of blood pressure.      Review of Systems   All other systems reviewed and are negative.    Past Medical History[1]  Past Surgical History[2]  Family History[3]  Social History[4]  Medications[5]  Allergies   Allergen Reactions    Penicillins Hives     Immunization History   Administered Date(s) Administered    COVID-19 MODERNA VACC 0.5 ML IM 2022    COVID-19 Moderna Vac BIVALENT 12 Yr+ IM 0.5 ML 10/04/2022    COVID-19 Moderna mRNA Vaccine 12 Yr+ 50 mcg/0.5 mL (Spikevax) 2023, 10/30/2024    COVID-19 PFIZER VACCINE 0.3 ML IM 2021, 2021, 2021, 2021, 2021    INFLUENZA 10/29/2021, 2022    Influenza Quadrivalent, 6-35 Months IM 2018    Influenza, recombinant,  "quadrivalent,injectable, preservative free 2020, 2021, 2022    Pneumococcal Conjugate 13-Valent 01/10/2017    Pneumococcal Conjugate Vaccine 20-valent (Pcv20), Polysace 2023    Tdap 2022, 2022    Zoster Vaccine Recombinant 2020, 2020     Objective   /86   Pulse 88   Temp 98.6 °F (37 °C)   Resp 16   Ht 6' 3\" (1.905 m)   Wt 95.3 kg (210 lb)   SpO2 96%   BMI 26.25 kg/m²     Physical Exam  Vitals and nursing note reviewed.   Constitutional:       General: He is not in acute distress.     Appearance: He is well-developed.   HENT:      Head: Normocephalic and atraumatic.     Eyes:      Conjunctiva/sclera: Conjunctivae normal.       Cardiovascular:      Rate and Rhythm: Normal rate and regular rhythm.      Heart sounds: Normal heart sounds. No murmur heard.  Pulmonary:      Effort: Pulmonary effort is normal. No respiratory distress.      Breath sounds: Normal breath sounds. No wheezing, rhonchi or rales.   Abdominal:      Palpations: Abdomen is soft.     Musculoskeletal:      Cervical back: Neck supple.      Right lower leg: No edema.      Left lower leg: No edema.     Skin:     General: Skin is warm and dry.      Capillary Refill: Capillary refill takes less than 2 seconds.     Neurological:      General: No focal deficit present.      Mental Status: He is alert. Mental status is at baseline.     Psychiatric:         Mood and Affect: Mood normal.              [1]   Past Medical History:  Diagnosis Date    Hypertension    [2]   Past Surgical History:  Procedure Laterality Date    CHOLECYSTECTOMY      GALLBLADDER SURGERY      TONSILLECTOMY     [3]   Family History  Problem Relation Name Age of Onset    Hypertension Mother Aishwarya Keith     Osteoporosis Mother Aishwarya Keith     Diabetes Father Bryan Keith     Heart disease Father Bryan Keith          from heart attack    Hypertension Father Bryan Keith             Hypertension Sister Gloria " Inna     Osteoporosis Sister Gloria Keith     Kidney cancer Brother Makr Keith     Hypertension Brother Mark Keith     Other Brother Mark Keith         kidney stones, calcium oxalate    Diabetes Brother Mark Keith     Cancer Maternal Grandfather Feliz Martin         Skin Cancer    Cancer Maternal Grandmother Gayle Martin          after multiple beginning with ovarian   [4]   Social History  Tobacco Use    Smoking status: Never    Smokeless tobacco: Never   Vaping Use    Vaping status: Never Used   Substance and Sexual Activity    Alcohol use: Yes     Alcohol/week: 4.0 standard drinks of alcohol     Types: 4 Glasses of wine per week     Comment: daily    Drug use: Never    Sexual activity: Yes     Partners: Female     Birth control/protection: Post-menopausal   [5]   Current Outpatient Medications on File Prior to Visit   Medication Sig    Black Pepper-Turmeric (Turmeric Complex/Black Pepper) 3-500 MG CAPS     Capsicum-Garlic (CAYENNE PLUS GARLIC PO)     ibuprofen (MOTRIN) 200 mg tablet Take by mouth every 6 (six) hours as needed for mild pain    Misc Natural Products (GLUCOS-CHONDROIT-MSM COMPLEX) TABS     Multiple Vitamins-Minerals (MENS MULTIPLUS PO) Take by mouth    QUERCETIN PO     rosuvastatin (CRESTOR) 5 mg tablet take 1 tablet by mouth once daily    tadalafil (CIALIS) 20 MG tablet Take 1 tablet (20 mg total) by mouth daily as needed for erectile dysfunction    [DISCONTINUED] cholecalciferol (VITAMIN D3) 400 units tablet Take 2,000 Units by mouth daily (Patient taking differently: Take 2,000 Units by mouth every other day)    [DISCONTINUED] lisinopril (ZESTRIL) 30 mg tablet take 1 tablet by mouth once daily    [DISCONTINUED] hydrALAZINE (APRESOLINE) 10 mg tablet Take 1 tablet (10 mg total) by mouth in the morning for 10 days

## 2025-06-04 ENCOUNTER — OFFICE VISIT (OUTPATIENT)
Age: 65
End: 2025-06-04
Payer: MEDICARE

## 2025-06-04 VITALS
HEIGHT: 75 IN | WEIGHT: 211.6 LBS | TEMPERATURE: 97.8 F | OXYGEN SATURATION: 97 % | HEART RATE: 72 BPM | BODY MASS INDEX: 26.31 KG/M2 | SYSTOLIC BLOOD PRESSURE: 140 MMHG | DIASTOLIC BLOOD PRESSURE: 82 MMHG

## 2025-06-04 DIAGNOSIS — I10 ESSENTIAL HYPERTENSION: ICD-10-CM

## 2025-06-04 PROCEDURE — 99214 OFFICE O/P EST MOD 30 MIN: CPT | Performed by: INTERNAL MEDICINE

## 2025-06-04 PROCEDURE — G2211 COMPLEX E/M VISIT ADD ON: HCPCS | Performed by: INTERNAL MEDICINE

## 2025-06-04 RX ORDER — HYDROCHLOROTHIAZIDE 12.5 MG/1
25 TABLET ORAL DAILY
Qty: 90 TABLET | Refills: 3 | Status: SHIPPED | OUTPATIENT
Start: 2025-06-04 | End: 2025-12-01

## 2025-06-04 RX ORDER — LISINOPRIL 40 MG/1
40 TABLET ORAL DAILY
Qty: 90 TABLET | Refills: 3 | Status: SHIPPED | OUTPATIENT
Start: 2025-06-04

## 2025-06-04 NOTE — PROGRESS NOTES
Name: Jules Keith      : 1960      MRN: [id number]  Encounter Provider: Jeff Mcgrath MD  Encounter Date: 2025   Encounter department: [facility] INTERNAL MEDICINE    Assessment & Plan  Essential hypertension  Overall blood pressure readings seem to be gradually improving I have recommended a adjustment to his hydrochlorothiazide increasing it from 12-1/2 mg to 25 mg daily and he should continue his current 40 mg of lisinopril daily.  He and his wife will be going on a river cruise in [place] and I believe he is safe to travel he can text me on epic if he needs to reach me with any problems.  Will see him when he returns from his trip for follow-up assessment of his blood pressure.    Orders:    hydroCHLOROthiazide 12.5 mg tablet; Take 2 tablets (25 mg total) by mouth daily    lisinopril (ZESTRIL) 40 mg tablet; Take 1 tablet (40 mg total) by mouth daily         History of Present Illness     This pleasant 65-year-old gentleman returns to the office today at our request for follow-up assessment of his hypertension.  He is here today in the company of his wife.  He brings with him a diary of blood pressure readings that he took with his home blood pressure cuff.  He overall show some improvement over his last visit.  He has purchased a new blood pressure cuff and will bring it with him on his next visit so we can correlate it with our manual blood pressure cuff.      Review of Systems   All other systems reviewed and are negative.    Past Medical History[1]  Past Surgical History[2]  Family History[3]  Social History[4]  Medications[5]  Allergies   Allergen Reactions    Penicillins Hives     Immunization History   Administered Date(s) Administered    COVID-19 MODERNA VACC 0.5 ML IM 2022    COVID-19 Moderna Vac BIVALENT 12 Yr+ IM 0.5 ML 10/04/2022    COVID-19 Moderna mRNA Vaccine 12 Yr+ 50 mcg/0.5 mL (Spikevax) 2023, 10/30/2024    COVID-19 PFIZER VACCINE 0.3 ML IM  "04/05/2021, 04/05/2021, 04/26/2021, 04/26/2021, 11/12/2021    INFLUENZA 10/29/2021, 12/06/2022    Influenza Quadrivalent, 6-35 Months IM 01/23/2018    Influenza, recombinant, quadrivalent,injectable, preservative free 01/30/2020, 02/05/2021, 12/06/2022    Pneumococcal Conjugate 13-Valent 01/10/2017    Pneumococcal Conjugate Vaccine 20-valent (Pcv20), Polysace 08/25/2023    Tdap 03/08/2022, 03/08/2022    Zoster Vaccine Recombinant 02/13/2020, 07/28/2020     Objective   /82   Pulse 72   Temp 97.8 °F (36.6 °C) (Tympanic)   Ht 6' 3\" (1.905 m)   Wt 96 kg (211 lb 9.6 oz)   SpO2 97%   BMI 26.45 kg/m²     Physical Exam  Vitals and nursing note reviewed.   Constitutional:       General: He is not in acute distress.     Appearance: He is well-developed.   HENT:      Head: Normocephalic and atraumatic.     Eyes:      Conjunctiva/sclera: Conjunctivae normal.       Cardiovascular:      Rate and Rhythm: Normal rate and regular rhythm.      Heart sounds: Normal heart sounds. No murmur heard.  Pulmonary:      Effort: Pulmonary effort is normal. No respiratory distress.      Breath sounds: Normal breath sounds. No wheezing, rhonchi or rales.   Abdominal:      Palpations: Abdomen is soft.     Musculoskeletal:      Cervical back: Neck supple.      Right lower leg: No edema.      Left lower leg: No edema.     Skin:     General: Skin is warm and dry.      Capillary Refill: Capillary refill takes less than 2 seconds.     Neurological:      Mental Status: He is alert.     Psychiatric:         Mood and Affect: Mood normal.              [1]   Past Medical History:  Diagnosis Date    Hypertension 2005   [2]   Past Surgical History:  Procedure Laterality Date    CHOLECYSTECTOMY      GALLBLADDER SURGERY      TONSILLECTOMY  1963   [3]   Family History  Problem Relation Name Age of Onset    Hypertension Mother Aishwarya Keith     Osteoporosis Mother Aishwarya Keith     Pulmonary embolism Mother Aishwarya Keith         After Knee replacement "    Diabetes Father Bryan     Heart disease Father Bryan          from heart attack    Hypertension Father Bryan             Arrhythmia Father Bryan         Sporadic    Heart attack Father Bryan          at 61 years    Early death Father Bryan         61 - Heart Attack    Hypertension Sister Glroia Keith     Osteoporosis Sister Gloria Keith     Kidney cancer Brother Mark Keith     Hypertension Brother Mark Keith     Other Brother Mark Keith         kidney stones, calcium oxalate    Diabetes Brother Mark Keith     Cancer Maternal Grandfather Feliz Martin         Skin Cancer    Cancer Maternal Grandmother Gayle Martin          after multiple beginning with ovarian    Cancer Brother Mark         Kidney    Cancer Maternal Aunt Lita         brain   [4]   Social History  Tobacco Use    Smoking status: Never    Smokeless tobacco: Never   Vaping Use    Vaping status: Never Used   Substance and Sexual Activity    Alcohol use: Yes     Alcohol/week: 4.0 standard drinks of alcohol     Types: 4 Glasses of wine per week     Comment: daily    Drug use: Never    Sexual activity: Yes     Partners: Female     Birth control/protection: Post-menopausal   [5]   Current Outpatient Medications on File Prior to Visit   Medication Sig    Black Pepper-Turmeric (Turmeric Complex/Black Pepper) 3-500 MG CAPS     Capsicum-Garlic (CAYENNE PLUS GARLIC PO)     cholecalciferol (VITAMIN D3) 400 units tablet Take 5 tablets (2,000 Units total) by mouth every other day    ibuprofen (MOTRIN) 200 mg tablet Take by mouth every 6 (six) hours as needed for mild pain    Misc Natural Products (GLUCOS-CHONDROIT-MSM COMPLEX) TABS     Multiple Vitamins-Minerals (MENS MULTIPLUS PO) Take by mouth    QUERCETIN PO     rosuvastatin (CRESTOR) 5 mg tablet take 1 tablet by mouth once daily    tadalafil (CIALIS) 20 MG tablet Take 1 tablet (20 mg total) by mouth daily as needed for erectile dysfunction    [DISCONTINUED]  hydroCHLOROthiazide 12.5 mg tablet Take 1 tablet (12.5 mg total) by mouth daily    [DISCONTINUED] lisinopril (ZESTRIL) 40 mg tablet Take 1 tablet (40 mg total) by mouth daily

## 2025-06-04 NOTE — ASSESSMENT & PLAN NOTE
Overall blood pressure readings seem to be gradually improving I have recommended a adjustment to his hydrochlorothiazide increasing it from 12-1/2 mg to 25 mg daily and he should continue his current 40 mg of lisinopril daily.  He and his wife will be going on a river cruise in [place] and I believe he is safe to travel he can text me on epic if he needs to reach me with any problems.  Will see him when he returns from his trip for follow-up assessment of his blood pressure.    Orders:    hydroCHLOROthiazide 12.5 mg tablet; Take 2 tablets (25 mg total) by mouth daily    lisinopril (ZESTRIL) 40 mg tablet; Take 1 tablet (40 mg total) by mouth daily

## 2025-06-18 ENCOUNTER — RA CDI HCC (OUTPATIENT)
Dept: OTHER | Facility: HOSPITAL | Age: 65
End: 2025-06-18

## 2025-06-23 ENCOUNTER — TELEPHONE (OUTPATIENT)
Age: 65
End: 2025-06-23

## 2025-06-23 NOTE — TELEPHONE ENCOUNTER
Ash called in asking if you want him to keep his appt this morning for a bp check.  He was away for 2 weeks in [place] and got covid.    His symptoms are mild now.  Tempa about 96.9, no more scratchy throat, head cold, slight cough.  These were all more elevated in the beginning.  Bp at 6am today 127/88.  He is also sending in a message with his readings.  He said while away he was drinking and eating out -food had salt.   Please advise  appt at 10am

## 2025-06-23 NOTE — TELEPHONE ENCOUNTER
Patient called to see if he should still come in for appointment today since he contracted Covid. Emma transferred to clerical.

## 2025-07-01 ENCOUNTER — OFFICE VISIT (OUTPATIENT)
Age: 65
End: 2025-07-01
Payer: MEDICARE

## 2025-07-01 VITALS
HEIGHT: 75 IN | BODY MASS INDEX: 26.26 KG/M2 | HEART RATE: 74 BPM | DIASTOLIC BLOOD PRESSURE: 74 MMHG | TEMPERATURE: 97.9 F | WEIGHT: 211.2 LBS | OXYGEN SATURATION: 98 % | SYSTOLIC BLOOD PRESSURE: 126 MMHG

## 2025-07-01 DIAGNOSIS — I10 ESSENTIAL HYPERTENSION: ICD-10-CM

## 2025-07-01 PROCEDURE — G2211 COMPLEX E/M VISIT ADD ON: HCPCS | Performed by: INTERNAL MEDICINE

## 2025-07-01 PROCEDURE — 99213 OFFICE O/P EST LOW 20 MIN: CPT | Performed by: INTERNAL MEDICINE

## 2025-07-01 RX ORDER — HYDROCHLOROTHIAZIDE 12.5 MG/1
25 TABLET ORAL DAILY
Qty: 180 TABLET | Refills: 3 | Status: SHIPPED | OUTPATIENT
Start: 2025-07-01 | End: 2026-06-26

## 2025-07-01 NOTE — ASSESSMENT & PLAN NOTE
Satisfactory control of hypertension continue lisinopril 40 mg daily and hydrochlorothiazide 25 mg daily follow-up in August at the time of his annual physical exam.    Orders:    hydroCHLOROthiazide 12.5 mg tablet; Take 2 tablets (25 mg total) by mouth daily

## 2025-07-01 NOTE — PROGRESS NOTES
Name: Jules Keith      : 1960      MRN: [id number]  Encounter Provider: Jeff Mcgrath MD  Encounter Date: 2025   Encounter department: [facility] INTERNAL MEDICINE    Assessment & Plan  Essential hypertension  Satisfactory control of hypertension continue lisinopril 40 mg daily and hydrochlorothiazide 25 mg daily follow-up in August at the time of his annual physical exam.    Orders:    hydroCHLOROthiazide 12.5 mg tablet; Take 2 tablets (25 mg total) by mouth daily         History of Present Illness     This 65-year-old gentleman returns today for follow-up visit.  He returned from his river cruise in [place] and developed COVID.  Now recuperating nicely with no significant residual symptoms.    Returns today for follow-up assessment of his hypertension.  Blood pressure today on our cuff in the office is 126/74.  He brought his home blood pressure cuffs which did not seem to correlate well with our office reading.  Recommend continuation of his current therapy including lisinopril 40 mg daily and hydrochlorothiazide 25 mg daily      Review of Systems   All other systems reviewed and are negative.    Past Medical History[1]  Past Surgical History[2]  Family History[3]  Social History[4]  Medications[5]  Allergies   Allergen Reactions    Penicillins Hives     Immunization History   Administered Date(s) Administered    COVID-19 MODERNA VACC 0.5 ML IM 2022    COVID-19 Moderna Vac BIVALENT 12 Yr+ IM 0.5 ML 10/04/2022    COVID-19 Moderna mRNA Vaccine 12 Yr+ 50 mcg/0.5 mL (Spikevax) 2023, 10/30/2024    COVID-19 PFIZER VACCINE 0.3 ML IM 2021, 2021, 2021, 2021, 2021    INFLUENZA 10/29/2021, 2022    Influenza Quadrivalent, 6-35 Months IM 2018    Influenza, recombinant, quadrivalent,injectable, preservative free 2020, 2021, 2022    Pneumococcal Conjugate 13-Valent 01/10/2017    Pneumococcal Conjugate Vaccine 20-valent  "(Pcv20), Polysace 2023    Tdap 2022, 2022    Zoster Vaccine Recombinant 2020, 2020     Objective   /74   Pulse 74   Temp 97.9 °F (36.6 °C) (Tympanic)   Ht 6' 3\" (1.905 m)   Wt 95.8 kg (211 lb 3.2 oz)   SpO2 98%   BMI 26.40 kg/m²     Physical Exam  Vitals and nursing note reviewed.   Constitutional:       General: He is not in acute distress.     Appearance: He is well-developed.   HENT:      Head: Normocephalic and atraumatic.     Eyes:      Conjunctiva/sclera: Conjunctivae normal.       Cardiovascular:      Rate and Rhythm: Normal rate and regular rhythm.      Heart sounds: Normal heart sounds. No murmur heard.  Pulmonary:      Effort: Pulmonary effort is normal. No respiratory distress.      Breath sounds: Normal breath sounds. No wheezing, rhonchi or rales.   Abdominal:      Palpations: Abdomen is soft.      Tenderness: There is no abdominal tenderness.     Musculoskeletal:      Cervical back: Neck supple.     Skin:     General: Skin is warm and dry.      Capillary Refill: Capillary refill takes less than 2 seconds.     Neurological:      Mental Status: He is alert.     Psychiatric:         Mood and Affect: Mood normal.              [1]   Past Medical History:  Diagnosis Date    Hypertension    [2]   Past Surgical History:  Procedure Laterality Date    CHOLECYSTECTOMY      GALLBLADDER SURGERY      TONSILLECTOMY     [3]   Family History  Problem Relation Name Age of Onset    Hypertension Mother Aishwarya Keith     Osteoporosis Mother Aishwarya Keith     Pulmonary embolism Mother Aishwarya Keith         After Knee replacement    Diabetes Father Bryan     Heart disease Father Bryan          from heart attack    Hypertension Father Bryan             Arrhythmia Father Bryan         Sporadic    Heart attack Father Bryan          at 61 years    Early death Father Bryan         61 - Heart Attack    Hypertension Sister Gloria Keith     Osteoporosis Sister " Gloriajosh Keith     Kidney cancer Brother Mark Keith     Hypertension Brother Mark Keith     Other Brother Mark Keith         kidney stones, calcium oxalate    Diabetes Brother Mark Keith     Cancer Maternal Grandfather Feliz Martin         Skin Cancer    Cancer Maternal Grandmother Gayle Martin          after multiple beginning with ovarian    Cancer Brother Mark         Kidney    Cancer Maternal Aunt Lita vann   [4]   Social History  Tobacco Use    Smoking status: Never    Smokeless tobacco: Never   Vaping Use    Vaping status: Never Used   Substance and Sexual Activity    Alcohol use: Yes     Alcohol/week: 4.0 standard drinks of alcohol     Types: 4 Glasses of wine per week     Comment: daily    Drug use: Never    Sexual activity: Yes     Partners: Female     Birth control/protection: Post-menopausal   [5]   Current Outpatient Medications on File Prior to Visit   Medication Sig    Black Pepper-Turmeric (Turmeric Complex/Black Pepper) 3-500 MG CAPS     Capsicum-Garlic (CAYENNE PLUS GARLIC PO)     cholecalciferol (VITAMIN D3) 400 units tablet Take 5 tablets (2,000 Units total) by mouth every other day    ibuprofen (MOTRIN) 200 mg tablet Take by mouth every 6 (six) hours as needed for mild pain    lisinopril (ZESTRIL) 40 mg tablet Take 1 tablet (40 mg total) by mouth daily    Misc Natural Products (GLUCOS-CHONDROIT-MSM COMPLEX) TABS     Multiple Vitamins-Minerals (MENS MULTIPLUS PO) Take by mouth    QUERCETIN PO     rosuvastatin (CRESTOR) 5 mg tablet take 1 tablet by mouth once daily    tadalafil (CIALIS) 20 MG tablet Take 1 tablet (20 mg total) by mouth daily as needed for erectile dysfunction    [DISCONTINUED] hydroCHLOROthiazide 12.5 mg tablet Take 2 tablets (25 mg total) by mouth daily

## 2025-08-12 ENCOUNTER — APPOINTMENT (OUTPATIENT)
Dept: LAB | Facility: CLINIC | Age: 65
End: 2025-08-12
Payer: MEDICARE